# Patient Record
Sex: FEMALE | Race: BLACK OR AFRICAN AMERICAN | Employment: OTHER | URBAN - METROPOLITAN AREA
[De-identification: names, ages, dates, MRNs, and addresses within clinical notes are randomized per-mention and may not be internally consistent; named-entity substitution may affect disease eponyms.]

---

## 2017-01-10 RX ORDER — MONTELUKAST SODIUM 10 MG/1
TABLET ORAL
Qty: 30 TABLET | Refills: 5 | Status: SHIPPED | OUTPATIENT
Start: 2017-01-10 | End: 2017-01-30 | Stop reason: SDUPTHER

## 2017-01-30 RX ORDER — MONTELUKAST SODIUM 10 MG/1
TABLET ORAL
Qty: 30 TABLET | Refills: 5 | Status: SHIPPED | OUTPATIENT
Start: 2017-01-30 | End: 2017-11-19 | Stop reason: SDUPTHER

## 2017-02-03 RX ORDER — HYDROCHLOROTHIAZIDE 12.5 MG/1
CAPSULE, GELATIN COATED ORAL
Qty: 30 CAPSULE | Refills: 5 | Status: CANCELLED | OUTPATIENT
Start: 2017-02-03

## 2017-02-04 RX ORDER — HYDROCHLOROTHIAZIDE 12.5 MG/1
CAPSULE, GELATIN COATED ORAL
Qty: 30 CAPSULE | Refills: 5 | Status: SHIPPED | OUTPATIENT
Start: 2017-02-04 | End: 2017-10-17 | Stop reason: SDUPTHER

## 2017-02-04 RX ORDER — PRAVASTATIN SODIUM 10 MG
TABLET ORAL
Qty: 30 TABLET | Refills: 0 | Status: SHIPPED | OUTPATIENT
Start: 2017-02-04 | End: 2017-04-04 | Stop reason: SDUPTHER

## 2017-02-07 RX ORDER — FAMOTIDINE 20 MG/1
TABLET, FILM COATED ORAL
Qty: 180 TABLET | Refills: 3 | Status: SHIPPED | OUTPATIENT
Start: 2017-02-07 | End: 2018-02-10 | Stop reason: SDUPTHER

## 2017-02-13 ENCOUNTER — CARE COORDINATION (OUTPATIENT)
Dept: CARE COORDINATION | Age: 67
End: 2017-02-13

## 2017-03-08 RX ORDER — SERTRALINE HYDROCHLORIDE 100 MG/1
TABLET, FILM COATED ORAL
Qty: 90 TABLET | Refills: 3 | Status: SHIPPED | OUTPATIENT
Start: 2017-03-08 | End: 2017-09-25 | Stop reason: SDUPTHER

## 2017-03-15 ENCOUNTER — OFFICE VISIT (OUTPATIENT)
Dept: PRIMARY CARE CLINIC | Age: 67
End: 2017-03-15

## 2017-03-15 VITALS
SYSTOLIC BLOOD PRESSURE: 140 MMHG | HEART RATE: 73 BPM | BODY MASS INDEX: 42.58 KG/M2 | OXYGEN SATURATION: 97 % | WEIGHT: 218 LBS | DIASTOLIC BLOOD PRESSURE: 70 MMHG

## 2017-03-15 DIAGNOSIS — Z79.4 CONTROLLED TYPE 2 DIABETES MELLITUS WITHOUT COMPLICATION, WITH LONG-TERM CURRENT USE OF INSULIN (HCC): ICD-10-CM

## 2017-03-15 DIAGNOSIS — I10 ESSENTIAL HYPERTENSION: ICD-10-CM

## 2017-03-15 DIAGNOSIS — E11.9 CONTROLLED TYPE 2 DIABETES MELLITUS WITHOUT COMPLICATION, WITH LONG-TERM CURRENT USE OF INSULIN (HCC): ICD-10-CM

## 2017-03-15 DIAGNOSIS — E78.00 PURE HYPERCHOLESTEROLEMIA: Primary | ICD-10-CM

## 2017-03-15 LAB
ANION GAP SERPL CALCULATED.3IONS-SCNC: 14 MMOL/L (ref 3–16)
BUN BLDV-MCNC: 14 MG/DL (ref 7–20)
CALCIUM SERPL-MCNC: 9.3 MG/DL (ref 8.3–10.6)
CHLORIDE BLD-SCNC: 102 MMOL/L (ref 99–110)
CO2: 30 MMOL/L (ref 21–32)
CREAT SERPL-MCNC: 0.8 MG/DL (ref 0.6–1.2)
GFR AFRICAN AMERICAN: >60
GFR NON-AFRICAN AMERICAN: >60
GLUCOSE BLD-MCNC: 67 MG/DL (ref 70–99)
POTASSIUM SERPL-SCNC: 3.7 MMOL/L (ref 3.5–5.1)
SODIUM BLD-SCNC: 146 MMOL/L (ref 136–145)

## 2017-03-15 PROCEDURE — 99214 OFFICE O/P EST MOD 30 MIN: CPT | Performed by: FAMILY MEDICINE

## 2017-03-15 ASSESSMENT — ENCOUNTER SYMPTOMS
BLURRED VISION: 0
ORTHOPNEA: 0
SHORTNESS OF BREATH: 0
VISUAL CHANGE: 0

## 2017-04-04 RX ORDER — LORATADINE 10 MG/1
TABLET ORAL
Qty: 30 TABLET | Refills: 5 | Status: SHIPPED | OUTPATIENT
Start: 2017-04-04 | End: 2017-09-25 | Stop reason: SDUPTHER

## 2017-04-04 RX ORDER — ALENDRONATE SODIUM 70 MG/1
TABLET ORAL
Qty: 4 TABLET | Refills: 5 | Status: SHIPPED | OUTPATIENT
Start: 2017-04-04 | End: 2017-09-23 | Stop reason: SDUPTHER

## 2017-04-04 RX ORDER — PRAVASTATIN SODIUM 10 MG
TABLET ORAL
Qty: 30 TABLET | Refills: 0 | Status: SHIPPED | OUTPATIENT
Start: 2017-04-04 | End: 2017-10-17 | Stop reason: ALTCHOICE

## 2017-04-13 DIAGNOSIS — Z79.4 TYPE 2 DIABETES MELLITUS WITHOUT COMPLICATION, WITH LONG-TERM CURRENT USE OF INSULIN (HCC): ICD-10-CM

## 2017-04-13 DIAGNOSIS — E11.9 TYPE 2 DIABETES MELLITUS WITHOUT COMPLICATION, WITH LONG-TERM CURRENT USE OF INSULIN (HCC): ICD-10-CM

## 2017-04-26 ENCOUNTER — CARE COORDINATION (OUTPATIENT)
Dept: CARE COORDINATION | Age: 67
End: 2017-04-26

## 2017-05-04 ENCOUNTER — TELEPHONE (OUTPATIENT)
Dept: PRIMARY CARE CLINIC | Age: 67
End: 2017-05-04

## 2017-05-05 RX ORDER — AZITHROMYCIN 250 MG/1
TABLET, FILM COATED ORAL
Qty: 1 PACKET | Refills: 0 | Status: SHIPPED | OUTPATIENT
Start: 2017-05-05 | End: 2017-05-12 | Stop reason: SDUPTHER

## 2017-05-09 ENCOUNTER — TELEPHONE (OUTPATIENT)
Dept: FAMILY MEDICINE CLINIC | Age: 67
End: 2017-05-09

## 2017-05-12 ENCOUNTER — TELEPHONE (OUTPATIENT)
Dept: PRIMARY CARE CLINIC | Age: 67
End: 2017-05-12

## 2017-05-12 RX ORDER — AZITHROMYCIN 250 MG/1
TABLET, FILM COATED ORAL
Qty: 1 PACKET | Refills: 0 | Status: SHIPPED | OUTPATIENT
Start: 2017-05-12 | End: 2017-05-22

## 2017-05-26 ENCOUNTER — TELEPHONE (OUTPATIENT)
Dept: PRIMARY CARE CLINIC | Age: 67
End: 2017-05-26

## 2017-05-31 ENCOUNTER — TELEPHONE (OUTPATIENT)
Dept: PRIMARY CARE CLINIC | Age: 67
End: 2017-05-31

## 2017-06-06 ENCOUNTER — TELEPHONE (OUTPATIENT)
Dept: PRIMARY CARE CLINIC | Age: 67
End: 2017-06-06

## 2017-06-16 ENCOUNTER — OFFICE VISIT (OUTPATIENT)
Dept: PRIMARY CARE CLINIC | Age: 67
End: 2017-06-16

## 2017-06-16 VITALS
DIASTOLIC BLOOD PRESSURE: 80 MMHG | TEMPERATURE: 97.7 F | BODY MASS INDEX: 42.58 KG/M2 | OXYGEN SATURATION: 100 % | SYSTOLIC BLOOD PRESSURE: 138 MMHG | WEIGHT: 218 LBS | RESPIRATION RATE: 16 BRPM | HEART RATE: 68 BPM

## 2017-06-16 DIAGNOSIS — R53.83 FATIGUE, UNSPECIFIED TYPE: Primary | ICD-10-CM

## 2017-06-16 DIAGNOSIS — Z79.4 CONTROLLED TYPE 2 DIABETES MELLITUS WITHOUT COMPLICATION, WITH LONG-TERM CURRENT USE OF INSULIN (HCC): ICD-10-CM

## 2017-06-16 DIAGNOSIS — E11.9 CONTROLLED TYPE 2 DIABETES MELLITUS WITHOUT COMPLICATION, WITH LONG-TERM CURRENT USE OF INSULIN (HCC): ICD-10-CM

## 2017-06-16 DIAGNOSIS — F17.211 CIGARETTE NICOTINE DEPENDENCE IN REMISSION: ICD-10-CM

## 2017-06-16 LAB
A/G RATIO: 1.4 (ref 1.1–2.2)
ALBUMIN SERPL-MCNC: 4.3 G/DL (ref 3.4–5)
ALP BLD-CCNC: 84 U/L (ref 40–129)
ALT SERPL-CCNC: 8 U/L (ref 10–40)
ANION GAP SERPL CALCULATED.3IONS-SCNC: 14 MMOL/L (ref 3–16)
AST SERPL-CCNC: 10 U/L (ref 15–37)
BASOPHILS ABSOLUTE: 0 K/UL (ref 0–0.2)
BASOPHILS RELATIVE PERCENT: 0.2 %
BILIRUB SERPL-MCNC: 0.4 MG/DL (ref 0–1)
BUN BLDV-MCNC: 18 MG/DL (ref 7–20)
CALCIUM SERPL-MCNC: 9.7 MG/DL (ref 8.3–10.6)
CHLORIDE BLD-SCNC: 101 MMOL/L (ref 99–110)
CO2: 27 MMOL/L (ref 21–32)
CREAT SERPL-MCNC: 0.8 MG/DL (ref 0.6–1.2)
EOSINOPHILS ABSOLUTE: 0.2 K/UL (ref 0–0.6)
EOSINOPHILS RELATIVE PERCENT: 1.4 %
GFR AFRICAN AMERICAN: >60
GFR NON-AFRICAN AMERICAN: >60
GLOBULIN: 3.1 G/DL
GLUCOSE BLD-MCNC: 67 MG/DL (ref 70–99)
HCT VFR BLD CALC: 38 % (ref 36–48)
HEMOGLOBIN: 12 G/DL (ref 12–16)
LYMPHOCYTES ABSOLUTE: 3.2 K/UL (ref 1–5.1)
LYMPHOCYTES RELATIVE PERCENT: 29.9 %
MCH RBC QN AUTO: 26.6 PG (ref 26–34)
MCHC RBC AUTO-ENTMCNC: 31.5 G/DL (ref 31–36)
MCV RBC AUTO: 84.4 FL (ref 80–100)
MONOCYTES ABSOLUTE: 0.7 K/UL (ref 0–1.3)
MONOCYTES RELATIVE PERCENT: 6.1 %
NEUTROPHILS ABSOLUTE: 6.7 K/UL (ref 1.7–7.7)
NEUTROPHILS RELATIVE PERCENT: 62.4 %
PDW BLD-RTO: 15.5 % (ref 12.4–15.4)
PLATELET # BLD: 345 K/UL (ref 135–450)
PMV BLD AUTO: 8.1 FL (ref 5–10.5)
POTASSIUM SERPL-SCNC: 4.4 MMOL/L (ref 3.5–5.1)
RBC # BLD: 4.5 M/UL (ref 4–5.2)
SODIUM BLD-SCNC: 142 MMOL/L (ref 136–145)
TOTAL PROTEIN: 7.4 G/DL (ref 6.4–8.2)
TSH SERPL DL<=0.05 MIU/L-ACNC: 2.09 UIU/ML (ref 0.27–4.2)
WBC # BLD: 10.8 K/UL (ref 4–11)

## 2017-06-16 PROCEDURE — 99214 OFFICE O/P EST MOD 30 MIN: CPT | Performed by: FAMILY MEDICINE

## 2017-06-16 RX ORDER — MULTIVIT-MIN/IRON FUM/FOLIC AC 7.5 MG-4
1 TABLET ORAL DAILY
Qty: 30 TABLET | Refills: 3 | Status: SHIPPED | OUTPATIENT
Start: 2017-06-16 | End: 2020-12-28 | Stop reason: SDUPTHER

## 2017-06-16 ASSESSMENT — ENCOUNTER SYMPTOMS
CHEST TIGHTNESS: 0
VISUAL CHANGE: 0
VOMITING: 0
TROUBLE SWALLOWING: 0
BACK PAIN: 0
CONSTIPATION: 0
ABDOMINAL PAIN: 0
EYE REDNESS: 0
SHORTNESS OF BREATH: 0
STRIDOR: 0
BLOOD IN STOOL: 0
NAUSEA: 0
COLOR CHANGE: 0
APNEA: 0
DIARRHEA: 0
SORE THROAT: 0
CHANGE IN BOWEL HABIT: 0
EYE DISCHARGE: 0
SINUS PRESSURE: 0
RECTAL PAIN: 0
CHOKING: 0
WHEEZING: 0
EYE ITCHING: 0
BLURRED VISION: 0
ABDOMINAL DISTENTION: 0
COUGH: 0
EYE PAIN: 0
PHOTOPHOBIA: 0
RHINORRHEA: 0
SWOLLEN GLANDS: 0

## 2017-07-03 RX ORDER — AMLODIPINE BESYLATE 10 MG/1
10 TABLET ORAL DAILY
Qty: 30 TABLET | Refills: 3 | Status: SHIPPED | OUTPATIENT
Start: 2017-07-03 | End: 2017-11-07 | Stop reason: SDUPTHER

## 2017-08-25 DIAGNOSIS — E11.9 CONTROLLED TYPE 2 DIABETES MELLITUS WITHOUT COMPLICATION, WITHOUT LONG-TERM CURRENT USE OF INSULIN (HCC): Primary | ICD-10-CM

## 2017-08-25 RX ORDER — GLUCOSAMINE HCL/CHONDROITIN SU 500-400 MG
CAPSULE ORAL
Qty: 200 STRIP | Refills: 5 | Status: SHIPPED | OUTPATIENT
Start: 2017-08-25 | End: 2017-10-30 | Stop reason: SDUPTHER

## 2017-09-15 ENCOUNTER — OFFICE VISIT (OUTPATIENT)
Dept: PRIMARY CARE CLINIC | Age: 67
End: 2017-09-15

## 2017-09-15 VITALS
BODY MASS INDEX: 42.58 KG/M2 | SYSTOLIC BLOOD PRESSURE: 130 MMHG | HEART RATE: 80 BPM | TEMPERATURE: 99 F | WEIGHT: 218 LBS | RESPIRATION RATE: 18 BRPM | OXYGEN SATURATION: 94 % | DIASTOLIC BLOOD PRESSURE: 60 MMHG

## 2017-09-15 DIAGNOSIS — I10 ESSENTIAL HYPERTENSION: ICD-10-CM

## 2017-09-15 DIAGNOSIS — Z23 NEED FOR TDAP VACCINATION: ICD-10-CM

## 2017-09-15 DIAGNOSIS — M17.10 ARTHRITIS OF KNEE: ICD-10-CM

## 2017-09-15 DIAGNOSIS — Z79.4 CONTROLLED TYPE 2 DIABETES MELLITUS WITHOUT COMPLICATION, WITH LONG-TERM CURRENT USE OF INSULIN (HCC): ICD-10-CM

## 2017-09-15 DIAGNOSIS — E11.9 CONTROLLED TYPE 2 DIABETES MELLITUS WITHOUT COMPLICATION, WITH LONG-TERM CURRENT USE OF INSULIN (HCC): ICD-10-CM

## 2017-09-15 DIAGNOSIS — E78.00 PURE HYPERCHOLESTEROLEMIA: Primary | ICD-10-CM

## 2017-09-15 DIAGNOSIS — F17.211 CIGARETTE NICOTINE DEPENDENCE IN REMISSION: ICD-10-CM

## 2017-09-15 DIAGNOSIS — Z23 FLU VACCINE NEED: ICD-10-CM

## 2017-09-15 LAB
ANION GAP SERPL CALCULATED.3IONS-SCNC: 18 MMOL/L (ref 3–16)
AST SERPL-CCNC: 11 U/L (ref 15–37)
BUN BLDV-MCNC: 19 MG/DL (ref 7–20)
CALCIUM SERPL-MCNC: 9.8 MG/DL (ref 8.3–10.6)
CHLORIDE BLD-SCNC: 100 MMOL/L (ref 99–110)
CHOLESTEROL, TOTAL: 164 MG/DL (ref 0–199)
CO2: 26 MMOL/L (ref 21–32)
CREAT SERPL-MCNC: 1 MG/DL (ref 0.6–1.2)
GFR AFRICAN AMERICAN: >60
GFR NON-AFRICAN AMERICAN: 55
GLUCOSE BLD-MCNC: 110 MG/DL (ref 70–99)
HBA1C MFR BLD: 7 %
HDLC SERPL-MCNC: 72 MG/DL (ref 40–60)
LDL CHOLESTEROL CALCULATED: 77 MG/DL
POTASSIUM SERPL-SCNC: 4.3 MMOL/L (ref 3.5–5.1)
SODIUM BLD-SCNC: 144 MMOL/L (ref 136–145)
TRIGL SERPL-MCNC: 77 MG/DL (ref 0–150)
VLDLC SERPL CALC-MCNC: 15 MG/DL

## 2017-09-15 PROCEDURE — G0008 ADMIN INFLUENZA VIRUS VAC: HCPCS | Performed by: FAMILY MEDICINE

## 2017-09-15 PROCEDURE — 90715 TDAP VACCINE 7 YRS/> IM: CPT | Performed by: FAMILY MEDICINE

## 2017-09-15 PROCEDURE — 99214 OFFICE O/P EST MOD 30 MIN: CPT | Performed by: FAMILY MEDICINE

## 2017-09-15 PROCEDURE — 90662 IIV NO PRSV INCREASED AG IM: CPT | Performed by: FAMILY MEDICINE

## 2017-09-15 PROCEDURE — 90471 IMMUNIZATION ADMIN: CPT | Performed by: FAMILY MEDICINE

## 2017-09-15 PROCEDURE — 83036 HEMOGLOBIN GLYCOSYLATED A1C: CPT | Performed by: FAMILY MEDICINE

## 2017-09-15 ASSESSMENT — ENCOUNTER SYMPTOMS
VOMITING: 0
EYE REDNESS: 0
SINUS PRESSURE: 0
BLURRED VISION: 0
APNEA: 0
CHEST TIGHTNESS: 0
COUGH: 0
SHORTNESS OF BREATH: 0
BLOOD IN STOOL: 0
EYE PAIN: 0
NAUSEA: 0
ORTHOPNEA: 0
VISUAL CHANGE: 0
COLOR CHANGE: 0
SORE THROAT: 0
WHEEZING: 0
EYE ITCHING: 0
STRIDOR: 0
BACK PAIN: 0
RECTAL PAIN: 0
EYE DISCHARGE: 0
RHINORRHEA: 0
DIARRHEA: 0
CHOKING: 0
PHOTOPHOBIA: 0
ABDOMINAL DISTENTION: 0
CONSTIPATION: 0
TROUBLE SWALLOWING: 0

## 2017-09-25 ENCOUNTER — TELEPHONE (OUTPATIENT)
Dept: PRIMARY CARE CLINIC | Age: 67
End: 2017-09-25

## 2017-09-25 DIAGNOSIS — I10 ESSENTIAL HYPERTENSION: Primary | ICD-10-CM

## 2017-09-25 DIAGNOSIS — J45.40 MODERATE PERSISTENT ASTHMA WITHOUT COMPLICATION: ICD-10-CM

## 2017-09-26 RX ORDER — ALENDRONATE SODIUM 70 MG/1
TABLET ORAL
Qty: 4 TABLET | Refills: 5 | Status: SHIPPED | OUTPATIENT
Start: 2017-09-26 | End: 2018-04-09 | Stop reason: SDUPTHER

## 2017-09-26 RX ORDER — SERTRALINE HYDROCHLORIDE 100 MG/1
TABLET, FILM COATED ORAL
Qty: 60 TABLET | Refills: 3 | Status: SHIPPED | OUTPATIENT
Start: 2017-09-26 | End: 2017-10-17 | Stop reason: SDUPTHER

## 2017-09-26 RX ORDER — LORATADINE 10 MG/1
TABLET ORAL
Qty: 30 TABLET | Refills: 3 | Status: SHIPPED | OUTPATIENT
Start: 2017-09-26 | End: 2018-02-10 | Stop reason: SDUPTHER

## 2017-10-17 DIAGNOSIS — I10 ESSENTIAL HYPERTENSION: ICD-10-CM

## 2017-10-17 RX ORDER — HYDROCHLOROTHIAZIDE 12.5 MG/1
12.5 CAPSULE, GELATIN COATED ORAL DAILY
Qty: 30 CAPSULE | Refills: 10 | Status: SHIPPED | OUTPATIENT
Start: 2017-10-17 | End: 2018-03-02 | Stop reason: SDUPTHER

## 2017-10-17 RX ORDER — ALBUTEROL SULFATE 2.5 MG/3ML
2.5 SOLUTION RESPIRATORY (INHALATION) EVERY 6 HOURS PRN
Qty: 120 EACH | Refills: 5 | Status: CANCELLED | OUTPATIENT
Start: 2017-10-17

## 2017-10-17 RX ORDER — LANCETS 30 GAUGE
EACH MISCELLANEOUS
Qty: 100 EACH | Refills: 5 | Status: SHIPPED | OUTPATIENT
Start: 2017-10-17 | End: 2017-10-30 | Stop reason: SDUPTHER

## 2017-10-17 RX ORDER — ALBUTEROL SULFATE 2.5 MG/3ML
SOLUTION RESPIRATORY (INHALATION)
Qty: 120 EACH | Refills: 5 | Status: SHIPPED | OUTPATIENT
Start: 2017-10-17 | End: 2019-02-14 | Stop reason: SDUPTHER

## 2017-10-17 RX ORDER — ALBUTEROL SULFATE 90 UG/1
2 AEROSOL, METERED RESPIRATORY (INHALATION) EVERY 6 HOURS PRN
Qty: 1 INHALER | Refills: 3 | Status: SHIPPED | OUTPATIENT
Start: 2017-10-17 | End: 2018-03-10 | Stop reason: SDUPTHER

## 2017-10-17 RX ORDER — ATORVASTATIN CALCIUM 40 MG/1
TABLET, FILM COATED ORAL
Qty: 30 TABLET | Refills: 10 | Status: SHIPPED | OUTPATIENT
Start: 2017-10-17 | End: 2018-10-10 | Stop reason: SDUPTHER

## 2017-10-17 RX ORDER — ERGOCALCIFEROL 1.25 MG/1
CAPSULE ORAL
Qty: 4 CAPSULE | Refills: 3 | Status: SHIPPED | OUTPATIENT
Start: 2017-10-17 | End: 2018-10-12 | Stop reason: SDUPTHER

## 2017-10-17 RX ORDER — SERTRALINE HYDROCHLORIDE 100 MG/1
TABLET, FILM COATED ORAL
Qty: 60 TABLET | Refills: 10 | Status: SHIPPED | OUTPATIENT
Start: 2017-10-17 | End: 2018-10-10 | Stop reason: SDUPTHER

## 2017-10-29 RX ORDER — PRAVASTATIN SODIUM 10 MG
TABLET ORAL
Qty: 30 TABLET | Refills: 6 | Status: SHIPPED | OUTPATIENT
Start: 2017-10-29 | End: 2019-05-06 | Stop reason: SDUPTHER

## 2017-10-30 DIAGNOSIS — E11.9 CONTROLLED TYPE 2 DIABETES MELLITUS WITHOUT COMPLICATION, WITHOUT LONG-TERM CURRENT USE OF INSULIN (HCC): ICD-10-CM

## 2017-10-30 RX ORDER — LANCETS 30 GAUGE
EACH MISCELLANEOUS
Qty: 100 EACH | Refills: 5 | Status: SHIPPED | OUTPATIENT
Start: 2017-10-30 | End: 2018-04-30 | Stop reason: SDUPTHER

## 2017-10-30 RX ORDER — GLUCOSAMINE HCL/CHONDROITIN SU 500-400 MG
CAPSULE ORAL
Qty: 200 STRIP | Refills: 5 | Status: SHIPPED | OUTPATIENT
Start: 2017-10-30 | End: 2018-06-15 | Stop reason: SDUPTHER

## 2017-10-30 RX ORDER — BLOOD PRESSURE TEST KIT
KIT MISCELLANEOUS
Qty: 200 EACH | Refills: 5 | Status: SHIPPED | OUTPATIENT
Start: 2017-10-30 | End: 2019-05-06 | Stop reason: SDUPTHER

## 2017-11-08 ENCOUNTER — TELEPHONE (OUTPATIENT)
Dept: PRIMARY CARE CLINIC | Age: 67
End: 2017-11-08

## 2017-11-08 RX ORDER — AMLODIPINE BESYLATE 10 MG/1
10 TABLET ORAL DAILY
Qty: 30 TABLET | Refills: 3 | Status: SHIPPED | OUTPATIENT
Start: 2017-11-08 | End: 2017-11-16 | Stop reason: SDUPTHER

## 2017-11-16 ENCOUNTER — HOSPITAL ENCOUNTER (OUTPATIENT)
Dept: MAMMOGRAPHY | Age: 67
Discharge: HOME | End: 2017-11-16
Attending: FAMILY MEDICINE | Admitting: FAMILY MEDICINE

## 2017-11-16 DIAGNOSIS — Z12.31 ENCOUNTER FOR SCREENING MAMMOGRAM FOR BREAST CANCER: ICD-10-CM

## 2018-01-02 ENCOUNTER — TELEPHONE (OUTPATIENT)
Dept: PRIMARY CARE CLINIC | Age: 68
End: 2018-01-02

## 2018-01-02 RX ORDER — GLUCOSAMINE HCL/CHONDROITIN SU 500-400 MG
CAPSULE ORAL
Qty: 100 STRIP | Refills: 10 | Status: SHIPPED | OUTPATIENT
Start: 2018-01-02 | End: 2018-01-06 | Stop reason: SDUPTHER

## 2018-01-05 ENCOUNTER — TELEPHONE (OUTPATIENT)
Dept: PRIMARY CARE CLINIC | Age: 68
End: 2018-01-05

## 2018-01-06 RX ORDER — GLUCOSAMINE HCL/CHONDROITIN SU 500-400 MG
CAPSULE ORAL
Qty: 100 STRIP | Refills: 10 | Status: SHIPPED | OUTPATIENT
Start: 2018-01-06 | End: 2018-06-15 | Stop reason: SDUPTHER

## 2018-01-22 DIAGNOSIS — E11.9 TYPE 2 DIABETES MELLITUS WITHOUT COMPLICATION, WITH LONG-TERM CURRENT USE OF INSULIN (HCC): Primary | ICD-10-CM

## 2018-01-22 DIAGNOSIS — Z79.4 TYPE 2 DIABETES MELLITUS WITHOUT COMPLICATION, WITH LONG-TERM CURRENT USE OF INSULIN (HCC): Primary | ICD-10-CM

## 2018-01-22 DIAGNOSIS — E11.9 CONTROLLED TYPE 2 DIABETES MELLITUS WITHOUT COMPLICATION, WITHOUT LONG-TERM CURRENT USE OF INSULIN (HCC): ICD-10-CM

## 2018-02-04 DIAGNOSIS — I10 ESSENTIAL HYPERTENSION: ICD-10-CM

## 2018-02-05 RX ORDER — SERTRALINE HYDROCHLORIDE 100 MG/1
TABLET, FILM COATED ORAL
Qty: 60 TABLET | Refills: 3 | OUTPATIENT
Start: 2018-02-05

## 2018-02-13 DIAGNOSIS — I10 ESSENTIAL HYPERTENSION: Primary | ICD-10-CM

## 2018-02-14 RX ORDER — AMLODIPINE BESYLATE 10 MG/1
TABLET ORAL
Qty: 90 TABLET | Refills: 1 | Status: SHIPPED | OUTPATIENT
Start: 2018-02-14 | End: 2018-10-12 | Stop reason: SDUPTHER

## 2018-02-16 ENCOUNTER — OFFICE VISIT (OUTPATIENT)
Dept: PRIMARY CARE CLINIC | Age: 68
End: 2018-02-16

## 2018-02-16 VITALS
OXYGEN SATURATION: 97 % | SYSTOLIC BLOOD PRESSURE: 127 MMHG | BODY MASS INDEX: 42.22 KG/M2 | TEMPERATURE: 97.4 F | DIASTOLIC BLOOD PRESSURE: 75 MMHG | HEART RATE: 82 BPM | WEIGHT: 216.2 LBS

## 2018-02-16 DIAGNOSIS — I10 ESSENTIAL HYPERTENSION: ICD-10-CM

## 2018-02-16 DIAGNOSIS — J45.40 MODERATE PERSISTENT ASTHMA WITHOUT COMPLICATION: ICD-10-CM

## 2018-02-16 DIAGNOSIS — L60.9 NAIL ABNORMALITY: Primary | ICD-10-CM

## 2018-02-16 DIAGNOSIS — Z79.4 TYPE 2 DIABETES MELLITUS WITHOUT COMPLICATION, WITH LONG-TERM CURRENT USE OF INSULIN (HCC): Primary | ICD-10-CM

## 2018-02-16 DIAGNOSIS — E78.00 PURE HYPERCHOLESTEROLEMIA: ICD-10-CM

## 2018-02-16 DIAGNOSIS — E11.9 TYPE 2 DIABETES MELLITUS WITHOUT COMPLICATION, WITH LONG-TERM CURRENT USE OF INSULIN (HCC): Primary | ICD-10-CM

## 2018-02-16 DIAGNOSIS — E11.9 TYPE 2 DIABETES MELLITUS WITHOUT COMPLICATION, WITH LONG-TERM CURRENT USE OF INSULIN (HCC): ICD-10-CM

## 2018-02-16 DIAGNOSIS — Z79.4 TYPE 2 DIABETES MELLITUS WITHOUT COMPLICATION, WITH LONG-TERM CURRENT USE OF INSULIN (HCC): ICD-10-CM

## 2018-02-16 LAB
ANION GAP SERPL CALCULATED.3IONS-SCNC: 15 MMOL/L (ref 3–16)
BUN BLDV-MCNC: 15 MG/DL (ref 7–20)
CALCIUM SERPL-MCNC: 9.5 MG/DL (ref 8.3–10.6)
CHLORIDE BLD-SCNC: 100 MMOL/L (ref 99–110)
CO2: 30 MMOL/L (ref 21–32)
CREAT SERPL-MCNC: 1 MG/DL (ref 0.6–1.2)
GFR AFRICAN AMERICAN: >60
GFR NON-AFRICAN AMERICAN: 55
GLUCOSE BLD-MCNC: 173 MG/DL (ref 70–99)
HBA1C MFR BLD: 6.9 %
POTASSIUM SERPL-SCNC: 3.6 MMOL/L (ref 3.5–5.1)
SODIUM BLD-SCNC: 145 MMOL/L (ref 136–145)

## 2018-02-16 PROCEDURE — 83036 HEMOGLOBIN GLYCOSYLATED A1C: CPT | Performed by: FAMILY MEDICINE

## 2018-02-16 PROCEDURE — 99214 OFFICE O/P EST MOD 30 MIN: CPT | Performed by: FAMILY MEDICINE

## 2018-02-16 ASSESSMENT — PATIENT HEALTH QUESTIONNAIRE - PHQ9
SUM OF ALL RESPONSES TO PHQ9 QUESTIONS 1 & 2: 1
1. LITTLE INTEREST OR PLEASURE IN DOING THINGS: 0
SUM OF ALL RESPONSES TO PHQ QUESTIONS 1-9: 1
2. FEELING DOWN, DEPRESSED OR HOPELESS: 1

## 2018-02-16 ASSESSMENT — ENCOUNTER SYMPTOMS
HOARSE VOICE: 0
VISUAL CHANGE: 0
BLURRED VISION: 0
HEMOPTYSIS: 0
ORTHOPNEA: 0
SPUTUM PRODUCTION: 0
TROUBLE SWALLOWING: 0
COUGH: 0
SHORTNESS OF BREATH: 0
SORE THROAT: 0
FREQUENT THROAT CLEARING: 0
WHEEZING: 0
RHINORRHEA: 0
CHEST TIGHTNESS: 0
DIFFICULTY BREATHING: 0
HEARTBURN: 0

## 2018-02-16 NOTE — PROGRESS NOTES
impotence, nephropathy, peripheral neuropathy, PVD or retinopathy. Risk factors for coronary artery disease include diabetes mellitus, dyslipidemia, male sex and hypertension. Current diabetic treatment includes oral agent (monotherapy) and insulin injections. She is compliant with treatment all of the time. She has had a previous visit with a dietitian. She participates in exercise daily. Her breakfast blood glucose range is generally 130-140 mg/dl. Her lunch blood glucose range is generally 130-140 mg/dl. Her dinner blood glucose range is generally 130-140 mg/dl. Her overall blood glucose range is 130-140 mg/dl. An ACE inhibitor/angiotensin II receptor blocker is being taken. Eye exam is current. Hyperlipidemia   This is a chronic problem. The current episode started more than 1 year ago. The problem is controlled. Recent lipid tests were reviewed and are normal. She has no history of chronic renal disease, diabetes, liver disease, obesity or nephrotic syndrome. Pertinent negatives include no chest pain, focal sensory loss, focal weakness, leg pain, myalgias or shortness of breath. Current antihyperlipidemic treatment includes statins. The current treatment provides significant improvement of lipids. Asthma   There is no chest tightness, cough, difficulty breathing, frequent throat clearing, hemoptysis, hoarse voice, shortness of breath, sputum production or wheezing. The current episode started more than 1 year ago. The problem occurs rarely. The problem has been rapidly improving. Pertinent negatives include no appetite change, chest pain, dyspnea on exertion, ear congestion, ear pain, fever, headaches, heartburn, malaise/fatigue, myalgias, nasal congestion, orthopnea, PND, postnasal drip, rhinorrhea, sneezing, sore throat, sweats, trouble swallowing or weight loss. Her symptoms are aggravated by emotional stress and climbing stairs.  Her symptoms are alleviated by steroid inhaler, beta-agonist and leukotriene antagonist. She reports complete improvement on treatment. Her past medical history is significant for asthma. There is no history of bronchiectasis, bronchitis, COPD, emphysema or pneumonia. Review of Systems   Constitutional: Negative for appetite change, fatigue, fever, malaise/fatigue and weight loss. HENT: Negative for ear pain, hoarse voice, postnasal drip, rhinorrhea, sneezing, sore throat and trouble swallowing. Eyes: Negative for blurred vision. Respiratory: Negative for cough, hemoptysis, sputum production, shortness of breath and wheezing. Cardiovascular: Negative for chest pain, dyspnea on exertion, palpitations, orthopnea and PND. Gastrointestinal: Negative for heartburn. Endocrine: Negative for polydipsia, polyphagia and polyuria. Genitourinary: Negative for impotence. Musculoskeletal: Negative for myalgias and neck pain. Skin: Negative for pallor. Neurological: Negative for dizziness, tremors, focal weakness, seizures, speech difficulty, weakness and headaches. Psychiatric/Behavioral: Negative for confusion. The patient is not nervous/anxious. Objective:   Physical Exam   Constitutional: She is oriented to person, place, and time. She appears well-developed and well-nourished. No distress. HENT:   Head: Normocephalic and atraumatic. Right Ear: External ear normal.   Left Ear: External ear normal.   Nose: Nose normal.   Mouth/Throat: Oropharynx is clear and moist. No oropharyngeal exudate. Eyes: Conjunctivae and EOM are normal. Pupils are equal, round, and reactive to light. Left eye exhibits no discharge. No scleral icterus. Neck: Normal range of motion. Neck supple. No JVD present. No tracheal deviation present. No thyromegaly present. Cardiovascular: Normal rate, regular rhythm, normal heart sounds and intact distal pulses. Exam reveals no gallop and no friction rub. No murmur heard.   Pulmonary/Chest: Effort normal and breath sounds normal. No stridor. No respiratory distress. She has no wheezes. She has no rales. She exhibits no tenderness. Abdominal: Soft. Bowel sounds are normal. She exhibits no distension and no mass. There is no tenderness. There is no rebound and no guarding. Musculoskeletal: Normal range of motion. She exhibits no edema or tenderness. Lymphadenopathy:     She has no cervical adenopathy. Neurological: She is alert and oriented to person, place, and time. She has normal reflexes. No cranial nerve deficit. Coordination normal.   Skin: Skin is warm and dry. No rash noted. She is not diaphoretic. No erythema. No pallor. Psychiatric: She has a normal mood and affect. Her behavior is normal. Judgment and thought content normal.   Nursing note and vitals reviewed. Lab Results   Component Value Date    CHOL 164 09/15/2017    CHOL 158 12/28/2016    CHOL 138 05/11/2015     Lab Results   Component Value Date    TRIG 77 09/15/2017    TRIG 63 12/28/2016    TRIG 63 05/11/2015     Lab Results   Component Value Date    HDL 72 (H) 09/15/2017    HDL 74 (H) 12/28/2016    HDL 63 (H) 05/11/2015     Lab Results   Component Value Date    LDLCALC 77 09/15/2017    LDLCALC 71 12/28/2016    LDLCALC 62 05/11/2015     Lab Results   Component Value Date    LABVLDL 15 09/15/2017    LABVLDL 13 12/28/2016    LABVLDL 13 05/11/2015     No results found for: [facility]  Lab Results   Component Value Date    CREATININE 1.0 09/15/2017    BUN 19 09/15/2017     09/15/2017    K 4.3 09/15/2017     09/15/2017    CO2 26 09/15/2017     Assessment:      1. Type 2 diabetes mellitus without complication, with long-term current use of insulin (HCC)  aic 6.9  At goal < 7  Ck labs  - POCT glycosylated hemoglobin (Hb A1C)  - Basic Metabolic Panel; Future    2. Pure hypercholesterolemia  Lipids at goal  HDL . 40  LDL < 100  On statin    3. Essential hypertension  bp at goal < 130/80    Ck labs    - Basic Metabolic Panel; Future    4.  Moderate

## 2018-02-25 DIAGNOSIS — J45.40 MODERATE PERSISTENT ASTHMA WITHOUT COMPLICATION: ICD-10-CM

## 2018-02-26 RX ORDER — LORATADINE 10 MG/1
TABLET ORAL
Qty: 30 TABLET | Refills: 3 | Status: SHIPPED | OUTPATIENT
Start: 2018-02-26 | End: 2018-10-12 | Stop reason: SDUPTHER

## 2018-03-10 DIAGNOSIS — J45.40 MODERATE PERSISTENT ASTHMA WITHOUT COMPLICATION: Primary | ICD-10-CM

## 2018-04-08 DIAGNOSIS — I10 ESSENTIAL HYPERTENSION: ICD-10-CM

## 2018-04-09 RX ORDER — ALENDRONATE SODIUM 70 MG/1
TABLET ORAL
Qty: 4 TABLET | Refills: 5 | Status: SHIPPED | OUTPATIENT
Start: 2018-04-09 | End: 2018-06-05 | Stop reason: SDUPTHER

## 2018-04-30 DIAGNOSIS — E11.9 CONTROLLED TYPE 2 DIABETES MELLITUS WITHOUT COMPLICATION, WITHOUT LONG-TERM CURRENT USE OF INSULIN (HCC): ICD-10-CM

## 2018-05-01 RX ORDER — PEN NEEDLE, DIABETIC 31 GX5/16"
NEEDLE, DISPOSABLE MISCELLANEOUS
Qty: 100 EACH | Refills: 5 | Status: SHIPPED | OUTPATIENT
Start: 2018-05-01 | End: 2018-10-12 | Stop reason: SDUPTHER

## 2018-05-01 RX ORDER — LANCING DEVICE
EACH MISCELLANEOUS
Qty: 100 EACH | Refills: 5 | Status: SHIPPED | OUTPATIENT
Start: 2018-05-01 | End: 2019-03-07 | Stop reason: SDUPTHER

## 2018-05-18 ENCOUNTER — OFFICE VISIT (OUTPATIENT)
Dept: PRIMARY CARE CLINIC | Age: 68
End: 2018-05-18

## 2018-05-18 VITALS
OXYGEN SATURATION: 94 % | TEMPERATURE: 97 F | WEIGHT: 217 LBS | HEIGHT: 60 IN | DIASTOLIC BLOOD PRESSURE: 67 MMHG | SYSTOLIC BLOOD PRESSURE: 123 MMHG | HEART RATE: 79 BPM | BODY MASS INDEX: 42.6 KG/M2

## 2018-05-18 DIAGNOSIS — M17.12 ARTHRITIS OF LEFT KNEE: ICD-10-CM

## 2018-05-18 DIAGNOSIS — Z23 NEED FOR SHINGLES VACCINE: ICD-10-CM

## 2018-05-18 DIAGNOSIS — I10 ESSENTIAL HYPERTENSION: ICD-10-CM

## 2018-05-18 LAB
ANION GAP SERPL CALCULATED.3IONS-SCNC: 17 MMOL/L (ref 3–16)
BUN BLDV-MCNC: 15 MG/DL (ref 7–20)
CALCIUM SERPL-MCNC: 9.2 MG/DL (ref 8.3–10.6)
CHLORIDE BLD-SCNC: 96 MMOL/L (ref 99–110)
CO2: 28 MMOL/L (ref 21–32)
CREAT SERPL-MCNC: 0.9 MG/DL (ref 0.6–1.2)
GFR AFRICAN AMERICAN: >60
GFR NON-AFRICAN AMERICAN: >60
GLUCOSE BLD-MCNC: 214 MG/DL (ref 70–99)
HBA1C MFR BLD: 7.2 %
POTASSIUM SERPL-SCNC: 3.5 MMOL/L (ref 3.5–5.1)
SODIUM BLD-SCNC: 141 MMOL/L (ref 136–145)

## 2018-05-18 PROCEDURE — 83036 HEMOGLOBIN GLYCOSYLATED A1C: CPT | Performed by: FAMILY MEDICINE

## 2018-05-18 PROCEDURE — 99214 OFFICE O/P EST MOD 30 MIN: CPT | Performed by: FAMILY MEDICINE

## 2018-05-18 ASSESSMENT — ENCOUNTER SYMPTOMS
VISUAL CHANGE: 0
SHORTNESS OF BREATH: 0
BLURRED VISION: 0
ORTHOPNEA: 0

## 2018-06-05 RX ORDER — ALENDRONATE SODIUM 35 MG/1
TABLET ORAL
Qty: 8 TABLET | Refills: 0 | Status: SHIPPED | OUTPATIENT
Start: 2018-06-05 | End: 2018-07-03 | Stop reason: SDUPTHER

## 2018-06-08 ENCOUNTER — OFFICE VISIT (OUTPATIENT)
Dept: PRIMARY CARE CLINIC | Age: 68
End: 2018-06-08

## 2018-06-08 VITALS
DIASTOLIC BLOOD PRESSURE: 80 MMHG | OXYGEN SATURATION: 96 % | WEIGHT: 215 LBS | HEIGHT: 60 IN | HEART RATE: 74 BPM | SYSTOLIC BLOOD PRESSURE: 122 MMHG | BODY MASS INDEX: 42.21 KG/M2 | TEMPERATURE: 97.6 F

## 2018-06-08 DIAGNOSIS — H61.23 BILATERAL IMPACTED CERUMEN: Primary | ICD-10-CM

## 2018-06-08 DIAGNOSIS — M20.60 DEFORMITY OF TOE, UNSPECIFIED LATERALITY: ICD-10-CM

## 2018-06-08 PROCEDURE — 99213 OFFICE O/P EST LOW 20 MIN: CPT | Performed by: FAMILY MEDICINE

## 2018-06-08 RX ORDER — FLUTICASONE PROPIONATE 110 UG/1
1 AEROSOL, METERED RESPIRATORY (INHALATION) 2 TIMES DAILY
COMMUNITY
End: 2018-09-14 | Stop reason: SDUPTHER

## 2018-06-08 ASSESSMENT — ENCOUNTER SYMPTOMS
SORE THROAT: 0
SHORTNESS OF BREATH: 0
VOMITING: 0
STRIDOR: 0
EYE ITCHING: 0
NAUSEA: 0
ABDOMINAL DISTENTION: 0
APNEA: 0
EYE DISCHARGE: 0
CHEST TIGHTNESS: 0
EYE REDNESS: 0
PHOTOPHOBIA: 0
CONSTIPATION: 0
DIARRHEA: 0
EYE PAIN: 0
BACK PAIN: 0
COUGH: 0
RHINORRHEA: 0
BLOOD IN STOOL: 0
CHOKING: 0
SINUS PRESSURE: 0
COLOR CHANGE: 0
RECTAL PAIN: 0
TROUBLE SWALLOWING: 0
WHEEZING: 0

## 2018-06-15 RX ORDER — GLUCOSAMINE HCL/CHONDROITIN SU 500-400 MG
CAPSULE ORAL
Qty: 100 STRIP | Refills: 10 | Status: SHIPPED | OUTPATIENT
Start: 2018-06-15 | End: 2018-10-12 | Stop reason: SDUPTHER

## 2018-07-03 RX ORDER — FAMOTIDINE 20 MG/1
TABLET, FILM COATED ORAL
Qty: 60 TABLET | Refills: 0 | Status: SHIPPED | OUTPATIENT
Start: 2018-07-03 | End: 2018-08-07 | Stop reason: SDUPTHER

## 2018-07-03 RX ORDER — ALENDRONATE SODIUM 70 MG/1
TABLET ORAL
Qty: 4 TABLET | Refills: 0 | Status: SHIPPED | OUTPATIENT
Start: 2018-07-03 | End: 2018-08-07 | Stop reason: SDUPTHER

## 2018-07-03 NOTE — TELEPHONE ENCOUNTER
Requested Prescriptions     Pending Prescriptions Disp Refills    alendronate (FOSAMAX) 70 MG tablet [Pharmacy Med Name: ALENDRONATE 70MG T(D)] 4 tablet 0     Sig: TAKE 1 TAB (70MG) BY MOUTH EVERY WEEK BEFORE BREAKFAST EMPTY STOMACH SIT UP 30 MIN (BONES)    famotidine (PEPCID) 20 MG tablet [Pharmacy Med Name: FAMOTIDINE 20MG T(R)] 60 tablet 0     Sig: TAKE 1 TABLET (20MG) BY MOUTH 2 TIMES A DAY (HEARTBURN)     Last OV: 6/8/2018

## 2018-07-06 DIAGNOSIS — Z79.4 TYPE 2 DIABETES MELLITUS WITHOUT COMPLICATION, WITH LONG-TERM CURRENT USE OF INSULIN (HCC): Primary | ICD-10-CM

## 2018-07-06 DIAGNOSIS — E11.9 TYPE 2 DIABETES MELLITUS WITHOUT COMPLICATION, WITH LONG-TERM CURRENT USE OF INSULIN (HCC): Primary | ICD-10-CM

## 2018-08-08 RX ORDER — ALENDRONATE SODIUM 70 MG/1
TABLET ORAL
Qty: 4 TABLET | Refills: 3 | Status: SHIPPED | OUTPATIENT
Start: 2018-08-08 | End: 2018-10-12 | Stop reason: SDUPTHER

## 2018-08-08 RX ORDER — FAMOTIDINE 20 MG/1
TABLET, FILM COATED ORAL
Qty: 60 TABLET | Refills: 3 | Status: SHIPPED | OUTPATIENT
Start: 2018-08-08 | End: 2018-10-12 | Stop reason: SDUPTHER

## 2018-09-14 ENCOUNTER — OFFICE VISIT (OUTPATIENT)
Dept: PRIMARY CARE CLINIC | Age: 68
End: 2018-09-14

## 2018-09-14 VITALS
TEMPERATURE: 96.7 F | DIASTOLIC BLOOD PRESSURE: 80 MMHG | OXYGEN SATURATION: 96 % | BODY MASS INDEX: 43.39 KG/M2 | HEART RATE: 78 BPM | HEIGHT: 60 IN | SYSTOLIC BLOOD PRESSURE: 135 MMHG | WEIGHT: 221 LBS

## 2018-09-14 DIAGNOSIS — Z13.220 SCREENING FOR HYPERLIPIDEMIA: ICD-10-CM

## 2018-09-14 DIAGNOSIS — Z23 NEED FOR PROPHYLACTIC VACCINATION AND INOCULATION AGAINST VARICELLA: ICD-10-CM

## 2018-09-14 DIAGNOSIS — E66.01 MORBIDLY OBESE (HCC): ICD-10-CM

## 2018-09-14 DIAGNOSIS — J45.40 MODERATE PERSISTENT ASTHMA, UNSPECIFIED WHETHER COMPLICATED: Primary | ICD-10-CM

## 2018-09-14 DIAGNOSIS — E55.9 VITAMIN D DEFICIENCY: ICD-10-CM

## 2018-09-14 LAB
CHOLESTEROL, TOTAL: 100 MG/DL (ref 0–199)
CREATININE URINE: 119.1 MG/DL (ref 28–259)
HBA1C MFR BLD: 6.9 %
HDLC SERPL-MCNC: 60 MG/DL (ref 40–60)
LDL CHOLESTEROL CALCULATED: 30 MG/DL
MICROALBUMIN UR-MCNC: 1.4 MG/DL
MICROALBUMIN/CREAT UR-RTO: 11.8 MG/G (ref 0–30)
TRIGL SERPL-MCNC: 49 MG/DL (ref 0–150)
VLDLC SERPL CALC-MCNC: 10 MG/DL

## 2018-09-14 PROCEDURE — 83036 HEMOGLOBIN GLYCOSYLATED A1C: CPT | Performed by: FAMILY MEDICINE

## 2018-09-14 PROCEDURE — 99214 OFFICE O/P EST MOD 30 MIN: CPT | Performed by: FAMILY MEDICINE

## 2018-09-14 RX ORDER — FLUTICASONE PROPIONATE 110 UG/1
1 AEROSOL, METERED RESPIRATORY (INHALATION) 2 TIMES DAILY
Qty: 1 INHALER | Refills: 5 | Status: SHIPPED | OUTPATIENT
Start: 2018-09-14 | End: 2019-07-19 | Stop reason: ALTCHOICE

## 2018-09-14 RX ORDER — ALBUTEROL SULFATE 90 UG/1
2 AEROSOL, METERED RESPIRATORY (INHALATION) EVERY 6 HOURS PRN
Qty: 1 INHALER | Refills: 3 | Status: SHIPPED | OUTPATIENT
Start: 2018-09-14 | End: 2021-02-22

## 2018-09-14 ASSESSMENT — ENCOUNTER SYMPTOMS
HEARTBURN: 0
TROUBLE SWALLOWING: 0
BLURRED VISION: 0
HOARSE VOICE: 0
HEMOPTYSIS: 0
SPUTUM PRODUCTION: 0
SHORTNESS OF BREATH: 0
COUGH: 1
DIFFICULTY BREATHING: 0
SORE THROAT: 0
CHEST TIGHTNESS: 1
WHEEZING: 0
VISUAL CHANGE: 0
FREQUENT THROAT CLEARING: 0
RHINORRHEA: 0

## 2018-10-10 DIAGNOSIS — I10 ESSENTIAL HYPERTENSION: ICD-10-CM

## 2018-10-10 RX ORDER — ATORVASTATIN CALCIUM 40 MG/1
TABLET, FILM COATED ORAL
Qty: 30 TABLET | Refills: 0 | Status: SHIPPED | OUTPATIENT
Start: 2018-10-10 | End: 2018-10-22 | Stop reason: SDUPTHER

## 2018-10-10 RX ORDER — HYDROCHLOROTHIAZIDE 25 MG/1
TABLET ORAL
Qty: 15 TABLET | Refills: 0 | Status: SHIPPED | OUTPATIENT
Start: 2018-10-10 | End: 2018-10-12 | Stop reason: SDUPTHER

## 2018-10-10 NOTE — TELEPHONE ENCOUNTER
Requested Prescriptions     Pending Prescriptions Disp Refills    hydrochlorothiazide (HYDRODIURIL) 25 MG tablet [Pharmacy Med Name: HYDROCHLRTHIAZ 25MG T(D)] 15 tablet 0     Sig: TAKE 1/2 TABLET (12.5MG) BY MOUTH EVERY DAY (DIURETIC/BLOOD PRESSURE)    atorvastatin (LIPITOR) 40 MG tablet [Pharmacy Med Name: ATORVASTATIN 40MG T(D)] 30 tablet 0     Sig: TAKE 1 TABLET (40MG) BY MOUTH EVERY DAY (CHOLESTEROL)    metFORMIN (GLUCOPHAGE) 1000 MG tablet [Pharmacy Med Name: METFORMIN HCL 1000MG T(D)] 60 tablet 0     Sig: TAKE 1 TABLET (1000MG) BY MOUTH 2 TIMES A DAY WITH MEALS (BLOOD SUGAR/DIABETES)    sertraline (ZOLOFT) 50 MG tablet [Pharmacy Med Name: SERTRALINE 50MG T(H)] 120 tablet 0     Sig: TAKE 2 TABLETS (100MG) BY MOUTH 2 TIMES A DAY (MOOD)    metoprolol tartrate (LOPRESSOR) 25 MG tablet [Pharmacy Med Name: METOPROLOL TART 25MG T(R)] 60 tablet 0     Sig: TAKE 1 TABLET (25MG) BY MOUTH 2 TIMES A DAY (BLOOD PRESSURE)       9/14/18

## 2018-10-12 DIAGNOSIS — I10 ESSENTIAL HYPERTENSION: ICD-10-CM

## 2018-10-12 DIAGNOSIS — J45.40 MODERATE PERSISTENT ASTHMA WITHOUT COMPLICATION: ICD-10-CM

## 2018-10-12 DIAGNOSIS — E11.9 TYPE 2 DIABETES MELLITUS WITHOUT COMPLICATION, WITH LONG-TERM CURRENT USE OF INSULIN (HCC): ICD-10-CM

## 2018-10-12 DIAGNOSIS — Z79.4 TYPE 2 DIABETES MELLITUS WITHOUT COMPLICATION, WITH LONG-TERM CURRENT USE OF INSULIN (HCC): ICD-10-CM

## 2018-10-12 RX ORDER — AMLODIPINE BESYLATE 10 MG/1
TABLET ORAL
Qty: 90 TABLET | Refills: 1 | Status: SHIPPED | OUTPATIENT
Start: 2018-10-12 | End: 2018-11-13 | Stop reason: SDUPTHER

## 2018-10-12 RX ORDER — ALENDRONATE SODIUM 70 MG/1
TABLET ORAL
Qty: 4 TABLET | Refills: 3 | Status: SHIPPED | OUTPATIENT
Start: 2018-10-12 | End: 2019-01-11 | Stop reason: SDUPTHER

## 2018-10-12 RX ORDER — LORATADINE 10 MG/1
TABLET ORAL
Qty: 30 TABLET | Refills: 3 | Status: SHIPPED | OUTPATIENT
Start: 2018-10-12 | End: 2019-01-11 | Stop reason: SDUPTHER

## 2018-10-12 RX ORDER — GLUCOSAMINE HCL/CHONDROITIN SU 500-400 MG
CAPSULE ORAL
Qty: 100 STRIP | Refills: 10 | Status: SHIPPED | OUTPATIENT
Start: 2018-10-12 | End: 2019-01-11 | Stop reason: SDUPTHER

## 2018-10-12 RX ORDER — HYDROCHLOROTHIAZIDE 25 MG/1
TABLET ORAL
Qty: 15 TABLET | Refills: 0 | Status: SHIPPED | OUTPATIENT
Start: 2018-10-12 | End: 2018-10-27 | Stop reason: SDUPTHER

## 2018-10-12 RX ORDER — ERGOCALCIFEROL 1.25 MG/1
CAPSULE ORAL
Qty: 4 CAPSULE | Refills: 3 | Status: SHIPPED | OUTPATIENT
Start: 2018-10-12 | End: 2018-11-13 | Stop reason: SDUPTHER

## 2018-10-12 RX ORDER — FAMOTIDINE 20 MG/1
TABLET, FILM COATED ORAL
Qty: 60 TABLET | Refills: 3 | Status: SHIPPED | OUTPATIENT
Start: 2018-10-12 | End: 2019-03-08 | Stop reason: SDUPTHER

## 2018-10-22 RX ORDER — ATORVASTATIN CALCIUM 40 MG/1
TABLET, FILM COATED ORAL
Qty: 30 TABLET | Refills: 0 | Status: SHIPPED | OUTPATIENT
Start: 2018-10-22 | End: 2018-11-13 | Stop reason: SDUPTHER

## 2018-10-22 NOTE — TELEPHONE ENCOUNTER
Requested Prescriptions     Pending Prescriptions Disp Refills    metFORMIN (GLUCOPHAGE) 1000 MG tablet 60 tablet 0    atorvastatin (LIPITOR) 40 MG tablet 30 tablet 0

## 2018-10-27 RX ORDER — HYDROCHLOROTHIAZIDE 25 MG/1
TABLET ORAL
Qty: 30 TABLET | Refills: 3 | Status: SHIPPED | OUTPATIENT
Start: 2018-10-27 | End: 2018-11-20 | Stop reason: SDUPTHER

## 2018-10-27 RX ORDER — HYDROCHLOROTHIAZIDE 25 MG/1
TABLET ORAL
Qty: 30 TABLET | Refills: 3 | Status: SHIPPED | OUTPATIENT
Start: 2018-10-27 | End: 2018-10-27 | Stop reason: SDUPTHER

## 2018-11-09 ENCOUNTER — TELEPHONE (OUTPATIENT)
Dept: PRIMARY CARE CLINIC | Age: 68
End: 2018-11-09

## 2018-11-09 NOTE — TELEPHONE ENCOUNTER
Patient is calling needing two Handicap prescriptions from Dr. Mary Bonilla. Please call patient and advise.

## 2018-11-13 DIAGNOSIS — I10 ESSENTIAL HYPERTENSION: ICD-10-CM

## 2018-11-14 RX ORDER — ATORVASTATIN CALCIUM 40 MG/1
TABLET, FILM COATED ORAL
Qty: 30 TABLET | Refills: 0 | Status: SHIPPED | OUTPATIENT
Start: 2018-11-14 | End: 2018-12-26 | Stop reason: SDUPTHER

## 2018-11-14 RX ORDER — AMLODIPINE BESYLATE 10 MG/1
TABLET ORAL
Qty: 30 TABLET | Refills: 0 | Status: SHIPPED | OUTPATIENT
Start: 2018-11-14 | End: 2018-12-26 | Stop reason: SDUPTHER

## 2018-11-14 RX ORDER — ERGOCALCIFEROL 1.25 MG/1
CAPSULE ORAL
Qty: 1 CAPSULE | Refills: 0 | Status: SHIPPED | OUTPATIENT
Start: 2018-11-14 | End: 2018-12-26 | Stop reason: SDUPTHER

## 2018-11-18 DIAGNOSIS — I10 ESSENTIAL HYPERTENSION: ICD-10-CM

## 2018-11-20 RX ORDER — HYDROCHLOROTHIAZIDE 25 MG/1
TABLET ORAL
Qty: 15 TABLET | Refills: 0 | Status: SHIPPED | OUTPATIENT
Start: 2018-11-20 | End: 2019-03-08 | Stop reason: SDUPTHER

## 2018-12-14 ENCOUNTER — OFFICE VISIT (OUTPATIENT)
Dept: PRIMARY CARE CLINIC | Age: 68
End: 2018-12-14
Payer: COMMERCIAL

## 2018-12-14 VITALS
SYSTOLIC BLOOD PRESSURE: 120 MMHG | OXYGEN SATURATION: 95 % | HEART RATE: 77 BPM | WEIGHT: 220 LBS | BODY MASS INDEX: 43.19 KG/M2 | TEMPERATURE: 97.2 F | HEIGHT: 60 IN | DIASTOLIC BLOOD PRESSURE: 71 MMHG

## 2018-12-14 DIAGNOSIS — E11.9 TYPE 2 DIABETES MELLITUS WITHOUT COMPLICATION, WITH LONG-TERM CURRENT USE OF INSULIN (HCC): Primary | ICD-10-CM

## 2018-12-14 DIAGNOSIS — I10 ESSENTIAL HYPERTENSION: ICD-10-CM

## 2018-12-14 DIAGNOSIS — E78.00 PURE HYPERCHOLESTEROLEMIA: ICD-10-CM

## 2018-12-14 DIAGNOSIS — J45.40 MODERATE PERSISTENT ASTHMA WITHOUT COMPLICATION: ICD-10-CM

## 2018-12-14 DIAGNOSIS — Z79.4 TYPE 2 DIABETES MELLITUS WITHOUT COMPLICATION, WITH LONG-TERM CURRENT USE OF INSULIN (HCC): Primary | ICD-10-CM

## 2018-12-14 DIAGNOSIS — Z23 NEED FOR SHINGLES VACCINE: ICD-10-CM

## 2018-12-14 DIAGNOSIS — Z12.31 ENCOUNTER FOR MAMMOGRAM TO ESTABLISH BASELINE MAMMOGRAM: ICD-10-CM

## 2018-12-14 PROCEDURE — 99214 OFFICE O/P EST MOD 30 MIN: CPT | Performed by: FAMILY MEDICINE

## 2018-12-14 ASSESSMENT — ENCOUNTER SYMPTOMS
DIFFICULTY BREATHING: 0
HEMOPTYSIS: 0
HOARSE VOICE: 0
TROUBLE SWALLOWING: 0
COUGH: 0
WHEEZING: 0
BLURRED VISION: 0
HEARTBURN: 0
CHEST TIGHTNESS: 0
SPUTUM PRODUCTION: 0
SORE THROAT: 0
FREQUENT THROAT CLEARING: 0
SHORTNESS OF BREATH: 0
RHINORRHEA: 0
VISUAL CHANGE: 0

## 2018-12-26 DIAGNOSIS — I10 ESSENTIAL HYPERTENSION: ICD-10-CM

## 2018-12-27 RX ORDER — ATORVASTATIN CALCIUM 40 MG/1
TABLET, FILM COATED ORAL
Qty: 30 TABLET | Refills: 3 | Status: SHIPPED | OUTPATIENT
Start: 2018-12-27 | End: 2019-03-08 | Stop reason: SDUPTHER

## 2018-12-27 RX ORDER — AMLODIPINE BESYLATE 10 MG/1
TABLET ORAL
Qty: 30 TABLET | Refills: 3 | Status: SHIPPED | OUTPATIENT
Start: 2018-12-27 | End: 2019-05-07 | Stop reason: SDUPTHER

## 2018-12-27 RX ORDER — PEN NEEDLE, DIABETIC 31 GX5/16"
NEEDLE, DISPOSABLE MISCELLANEOUS
Qty: 100 EACH | Refills: 3 | Status: SHIPPED | OUTPATIENT
Start: 2018-12-27 | End: 2019-09-05 | Stop reason: SDUPTHER

## 2018-12-27 RX ORDER — ERGOCALCIFEROL 1.25 MG/1
CAPSULE ORAL
Qty: 1 CAPSULE | Refills: 3 | Status: SHIPPED | OUTPATIENT
Start: 2018-12-27 | End: 2019-03-08 | Stop reason: SDUPTHER

## 2019-01-10 ENCOUNTER — TELEPHONE (OUTPATIENT)
Dept: PRIMARY CARE CLINIC | Age: 69
End: 2019-01-10

## 2019-01-11 DIAGNOSIS — I10 ESSENTIAL HYPERTENSION: Primary | ICD-10-CM

## 2019-01-11 RX ORDER — LORATADINE 10 MG/1
TABLET ORAL
Qty: 30 TABLET | Refills: 5 | Status: SHIPPED | OUTPATIENT
Start: 2019-01-11 | End: 2019-03-08 | Stop reason: SDUPTHER

## 2019-01-11 RX ORDER — GLUCOSAMINE HCL/CHONDROITIN SU 500-400 MG
CAPSULE ORAL
Qty: 100 STRIP | Refills: 11 | Status: SHIPPED | OUTPATIENT
Start: 2019-01-11 | End: 2019-05-06 | Stop reason: SDUPTHER

## 2019-01-11 RX ORDER — ALENDRONATE SODIUM 70 MG/1
TABLET ORAL
Qty: 4 TABLET | Refills: 5 | Status: SHIPPED | OUTPATIENT
Start: 2019-01-11 | End: 2019-03-08 | Stop reason: SDUPTHER

## 2019-02-02 RX ORDER — BUDESONIDE AND FORMOTEROL FUMARATE DIHYDRATE 160; 4.5 UG/1; UG/1
2 AEROSOL RESPIRATORY (INHALATION) 2 TIMES DAILY
Qty: 1 INHALER | Refills: 3 | Status: SHIPPED | OUTPATIENT
Start: 2019-02-02 | End: 2019-06-05 | Stop reason: SDUPTHER

## 2019-02-15 RX ORDER — ALBUTEROL SULFATE 2.5 MG/3ML
SOLUTION RESPIRATORY (INHALATION)
Qty: 270 ML | Refills: 0 | Status: SHIPPED | OUTPATIENT
Start: 2019-02-15 | End: 2019-04-02 | Stop reason: SDUPTHER

## 2019-03-07 ENCOUNTER — TELEPHONE (OUTPATIENT)
Dept: PRIMARY CARE CLINIC | Age: 69
End: 2019-03-07

## 2019-03-07 DIAGNOSIS — E11.9 CONTROLLED TYPE 2 DIABETES MELLITUS WITHOUT COMPLICATION, WITHOUT LONG-TERM CURRENT USE OF INSULIN (HCC): ICD-10-CM

## 2019-03-08 RX ORDER — LANCETS
EACH MISCELLANEOUS
Qty: 100 EACH | Refills: 5 | Status: SHIPPED | OUTPATIENT
Start: 2019-03-08

## 2019-03-08 RX ORDER — ATORVASTATIN CALCIUM 40 MG/1
TABLET, FILM COATED ORAL
Qty: 30 TABLET | Refills: 3 | Status: SHIPPED | OUTPATIENT
Start: 2019-03-08 | End: 2019-05-06 | Stop reason: SDUPTHER

## 2019-03-08 RX ORDER — LORATADINE 10 MG/1
TABLET ORAL
Qty: 30 TABLET | Refills: 0 | Status: SHIPPED | OUTPATIENT
Start: 2019-03-08 | End: 2019-04-02 | Stop reason: SDUPTHER

## 2019-03-08 RX ORDER — ALENDRONATE SODIUM 35 MG/1
TABLET ORAL
Qty: 8 TABLET | Refills: 0 | Status: SHIPPED | OUTPATIENT
Start: 2019-03-08 | End: 2019-04-02 | Stop reason: SDUPTHER

## 2019-03-08 RX ORDER — LORATADINE 10 MG/1
TABLET ORAL
Qty: 30 TABLET | Refills: 5 | Status: SHIPPED | OUTPATIENT
Start: 2019-03-08 | End: 2019-04-05 | Stop reason: SDUPTHER

## 2019-03-08 RX ORDER — ERGOCALCIFEROL 1.25 MG/1
CAPSULE ORAL
Qty: 1 CAPSULE | Refills: 3 | Status: SHIPPED | OUTPATIENT
Start: 2019-03-08 | End: 2019-05-06 | Stop reason: SDUPTHER

## 2019-03-08 RX ORDER — HYDROCHLOROTHIAZIDE 25 MG/1
TABLET ORAL
Qty: 15 TABLET | Refills: 3 | Status: SHIPPED | OUTPATIENT
Start: 2019-03-08 | End: 2019-04-03 | Stop reason: SDUPTHER

## 2019-03-08 RX ORDER — FAMOTIDINE 20 MG/1
TABLET, FILM COATED ORAL
Qty: 60 TABLET | Refills: 3 | Status: SHIPPED | OUTPATIENT
Start: 2019-03-08 | End: 2019-04-02 | Stop reason: SDUPTHER

## 2019-03-29 ENCOUNTER — OFFICE VISIT (OUTPATIENT)
Dept: PRIMARY CARE CLINIC | Age: 69
End: 2019-03-29
Payer: COMMERCIAL

## 2019-03-29 VITALS
HEIGHT: 60 IN | OXYGEN SATURATION: 95 % | HEART RATE: 70 BPM | SYSTOLIC BLOOD PRESSURE: 130 MMHG | BODY MASS INDEX: 43.19 KG/M2 | DIASTOLIC BLOOD PRESSURE: 80 MMHG | WEIGHT: 220 LBS

## 2019-03-29 DIAGNOSIS — M17.0 PRIMARY OSTEOARTHRITIS OF BOTH KNEES: ICD-10-CM

## 2019-03-29 DIAGNOSIS — E11.9 TYPE 2 DIABETES MELLITUS WITHOUT COMPLICATION, WITH LONG-TERM CURRENT USE OF INSULIN (HCC): ICD-10-CM

## 2019-03-29 DIAGNOSIS — M51.36 DDD (DEGENERATIVE DISC DISEASE), LUMBAR: ICD-10-CM

## 2019-03-29 DIAGNOSIS — Z79.4 TYPE 2 DIABETES MELLITUS WITHOUT COMPLICATION, WITH LONG-TERM CURRENT USE OF INSULIN (HCC): ICD-10-CM

## 2019-03-29 DIAGNOSIS — E78.00 PURE HYPERCHOLESTEROLEMIA: ICD-10-CM

## 2019-03-29 DIAGNOSIS — I10 ESSENTIAL HYPERTENSION: ICD-10-CM

## 2019-03-29 LAB
GLUCOSE BLD-MCNC: 140 MG/DL
HBA1C MFR BLD: 7.1 %
HCT VFR BLD CALC: 36.8 % (ref 36–48)
HEMOGLOBIN: 11.8 G/DL (ref 12–16)
MCH RBC QN AUTO: 26.6 PG (ref 26–34)
MCHC RBC AUTO-ENTMCNC: 32 G/DL (ref 31–36)
MCV RBC AUTO: 83 FL (ref 80–100)
PDW BLD-RTO: 15 % (ref 12.4–15.4)
PLATELET # BLD: 336 K/UL (ref 135–450)
PMV BLD AUTO: 8.7 FL (ref 5–10.5)
RBC # BLD: 4.43 M/UL (ref 4–5.2)
WBC # BLD: 8.8 K/UL (ref 4–11)

## 2019-03-29 PROCEDURE — G0444 DEPRESSION SCREEN ANNUAL: HCPCS | Performed by: FAMILY MEDICINE

## 2019-03-29 PROCEDURE — 99214 OFFICE O/P EST MOD 30 MIN: CPT | Performed by: FAMILY MEDICINE

## 2019-03-29 PROCEDURE — 83036 HEMOGLOBIN GLYCOSYLATED A1C: CPT | Performed by: FAMILY MEDICINE

## 2019-03-29 PROCEDURE — 82962 GLUCOSE BLOOD TEST: CPT | Performed by: FAMILY MEDICINE

## 2019-03-29 RX ORDER — ACETAMINOPHEN 500 MG
500 TABLET ORAL 4 TIMES DAILY PRN
Qty: 360 TABLET | Refills: 1 | Status: SHIPPED | OUTPATIENT
Start: 2019-03-29 | End: 2019-08-16 | Stop reason: SDUPTHER

## 2019-03-29 ASSESSMENT — PATIENT HEALTH QUESTIONNAIRE - PHQ9
2. FEELING DOWN, DEPRESSED OR HOPELESS: 2
10. IF YOU CHECKED OFF ANY PROBLEMS, HOW DIFFICULT HAVE THESE PROBLEMS MADE IT FOR YOU TO DO YOUR WORK, TAKE CARE OF THINGS AT HOME, OR GET ALONG WITH OTHER PEOPLE: 0
1. LITTLE INTEREST OR PLEASURE IN DOING THINGS: 2
SUM OF ALL RESPONSES TO PHQ9 QUESTIONS 1 & 2: 4
SUM OF ALL RESPONSES TO PHQ QUESTIONS 1-9: 7
7. TROUBLE CONCENTRATING ON THINGS, SUCH AS READING THE NEWSPAPER OR WATCHING TELEVISION: 0
5. POOR APPETITE OR OVEREATING: 0
3. TROUBLE FALLING OR STAYING ASLEEP: 0
6. FEELING BAD ABOUT YOURSELF - OR THAT YOU ARE A FAILURE OR HAVE LET YOURSELF OR YOUR FAMILY DOWN: 0
9. THOUGHTS THAT YOU WOULD BE BETTER OFF DEAD, OR OF HURTING YOURSELF: 0
4. FEELING TIRED OR HAVING LITTLE ENERGY: 3
8. MOVING OR SPEAKING SO SLOWLY THAT OTHER PEOPLE COULD HAVE NOTICED. OR THE OPPOSITE, BEING SO FIGETY OR RESTLESS THAT YOU HAVE BEEN MOVING AROUND A LOT MORE THAN USUAL: 0
SUM OF ALL RESPONSES TO PHQ QUESTIONS 1-9: 7

## 2019-03-29 ASSESSMENT — ENCOUNTER SYMPTOMS
BLURRED VISION: 0
BACK PAIN: 1
ABDOMINAL PAIN: 0
VISUAL CHANGE: 0

## 2019-03-30 LAB
A/G RATIO: 1.5 (ref 1.1–2.2)
ALBUMIN SERPL-MCNC: 4.4 G/DL (ref 3.4–5)
ALP BLD-CCNC: 90 U/L (ref 40–129)
ALT SERPL-CCNC: 13 U/L (ref 10–40)
ANION GAP SERPL CALCULATED.3IONS-SCNC: 13 MMOL/L (ref 3–16)
AST SERPL-CCNC: 13 U/L (ref 15–37)
BILIRUB SERPL-MCNC: 0.3 MG/DL (ref 0–1)
BUN BLDV-MCNC: 13 MG/DL (ref 7–20)
CALCIUM SERPL-MCNC: 9.7 MG/DL (ref 8.3–10.6)
CHLORIDE BLD-SCNC: 97 MMOL/L (ref 99–110)
CHOLESTEROL, TOTAL: 103 MG/DL (ref 0–199)
CO2: 31 MMOL/L (ref 21–32)
CREAT SERPL-MCNC: 0.8 MG/DL (ref 0.6–1.2)
GFR AFRICAN AMERICAN: >60
GFR NON-AFRICAN AMERICAN: >60
GLOBULIN: 2.9 G/DL
GLUCOSE BLD-MCNC: 160 MG/DL (ref 70–99)
HDLC SERPL-MCNC: 57 MG/DL (ref 40–60)
LDL CHOLESTEROL CALCULATED: 36 MG/DL
POTASSIUM SERPL-SCNC: 4 MMOL/L (ref 3.5–5.1)
SODIUM BLD-SCNC: 141 MMOL/L (ref 136–145)
TOTAL PROTEIN: 7.3 G/DL (ref 6.4–8.2)
TRIGL SERPL-MCNC: 52 MG/DL (ref 0–150)
VLDLC SERPL CALC-MCNC: 10 MG/DL

## 2019-04-01 ENCOUNTER — TELEPHONE (OUTPATIENT)
Dept: PRIMARY CARE CLINIC | Age: 69
End: 2019-04-01

## 2019-04-01 NOTE — TELEPHONE ENCOUNTER
I want the new prescription to go to [place] [facility]. She does not need to send the script  I gave to her since it is different.  Let her know

## 2019-04-02 DIAGNOSIS — J45.40 MODERATE PERSISTENT ASTHMA WITHOUT COMPLICATION: ICD-10-CM

## 2019-04-02 NOTE — TELEPHONE ENCOUNTER
[facility] said they received a fax from us but the only thing that came through was the cover sheet. They did not get the order.      Please refax to attention of Fallon Garza @ [phone]

## 2019-04-03 RX ORDER — HYDROCHLOROTHIAZIDE 25 MG/1
TABLET ORAL
Qty: 15 TABLET | Refills: 0 | Status: SHIPPED | OUTPATIENT
Start: 2019-04-03 | End: 2019-04-05 | Stop reason: SDUPTHER

## 2019-04-05 RX ORDER — FAMOTIDINE 20 MG/1
TABLET, FILM COATED ORAL
Qty: 60 TABLET | Refills: 0 | Status: SHIPPED | OUTPATIENT
Start: 2019-04-05 | End: 2019-05-06 | Stop reason: SDUPTHER

## 2019-04-05 RX ORDER — HYDROCHLOROTHIAZIDE 50 MG/1
TABLET ORAL
Qty: 15 TABLET | Refills: 0 | Status: SHIPPED | OUTPATIENT
Start: 2019-04-05 | End: 2019-05-06 | Stop reason: SDUPTHER

## 2019-04-05 RX ORDER — ALBUTEROL SULFATE 2.5 MG/3ML
SOLUTION RESPIRATORY (INHALATION)
Qty: 270 ML | Refills: 0 | Status: SHIPPED | OUTPATIENT
Start: 2019-04-05 | End: 2019-05-07 | Stop reason: SDUPTHER

## 2019-04-05 RX ORDER — LORATADINE 10 MG/1
TABLET ORAL
Qty: 30 TABLET | Refills: 0 | Status: SHIPPED | OUTPATIENT
Start: 2019-04-05 | End: 2019-06-05 | Stop reason: SDUPTHER

## 2019-04-05 RX ORDER — ALENDRONATE SODIUM 35 MG/1
TABLET ORAL
Qty: 8 TABLET | Refills: 0 | Status: SHIPPED | OUTPATIENT
Start: 2019-04-05 | End: 2019-05-06 | Stop reason: SDUPTHER

## 2019-04-08 ENCOUNTER — TELEPHONE (OUTPATIENT)
Dept: PRIMARY CARE CLINIC | Age: 69
End: 2019-04-08

## 2019-04-08 NOTE — TELEPHONE ENCOUNTER
Pt was advised to [place] [facility]- pt' states they will not help her.     Pt will find someone and call back to send ov notes

## 2019-04-08 NOTE — TELEPHONE ENCOUNTER
Patient is calling about getting a hospital bed .  She has the prescription but Dr. Raymond Holland needs to talk to the therapist . She is not sure what to do. cb pt and also needs meds refills too

## 2019-04-24 DIAGNOSIS — J45.40 MODERATE PERSISTENT ASTHMA, UNSPECIFIED WHETHER COMPLICATED: Primary | ICD-10-CM

## 2019-04-24 RX ORDER — MONTELUKAST SODIUM 10 MG/1
10 TABLET ORAL DAILY
Qty: 30 TABLET | Refills: 10 | Status: SHIPPED | OUTPATIENT
Start: 2019-04-24 | End: 2019-12-02 | Stop reason: SDUPTHER

## 2019-05-06 DIAGNOSIS — Z79.4 TYPE 2 DIABETES MELLITUS WITHOUT COMPLICATION, WITH LONG-TERM CURRENT USE OF INSULIN (HCC): ICD-10-CM

## 2019-05-06 DIAGNOSIS — E11.9 TYPE 2 DIABETES MELLITUS WITHOUT COMPLICATION, WITH LONG-TERM CURRENT USE OF INSULIN (HCC): ICD-10-CM

## 2019-05-06 DIAGNOSIS — E11.9 CONTROLLED TYPE 2 DIABETES MELLITUS WITHOUT COMPLICATION, WITHOUT LONG-TERM CURRENT USE OF INSULIN (HCC): ICD-10-CM

## 2019-05-06 DIAGNOSIS — I10 ESSENTIAL HYPERTENSION: ICD-10-CM

## 2019-05-06 RX ORDER — FAMOTIDINE 20 MG/1
TABLET, FILM COATED ORAL
Qty: 60 TABLET | Refills: 2 | Status: SHIPPED | OUTPATIENT
Start: 2019-05-06 | End: 2019-07-19 | Stop reason: SDUPTHER

## 2019-05-06 RX ORDER — PRAVASTATIN SODIUM 10 MG
10 TABLET ORAL DAILY
Qty: 30 TABLET | Refills: 3 | Status: SHIPPED | OUTPATIENT
Start: 2019-05-06 | End: 2019-10-30 | Stop reason: SDUPTHER

## 2019-05-06 RX ORDER — ATORVASTATIN CALCIUM 40 MG/1
TABLET, FILM COATED ORAL
Qty: 30 TABLET | Refills: 3 | Status: SHIPPED | OUTPATIENT
Start: 2019-05-06 | End: 2019-06-25 | Stop reason: SDUPTHER

## 2019-05-06 RX ORDER — ERGOCALCIFEROL 1.25 MG/1
CAPSULE ORAL
Qty: 1 CAPSULE | Refills: 3 | Status: SHIPPED | OUTPATIENT
Start: 2019-05-06 | End: 2019-09-09 | Stop reason: SDUPTHER

## 2019-05-06 RX ORDER — HYDROCHLOROTHIAZIDE 50 MG/1
TABLET ORAL
Qty: 15 TABLET | Refills: 0 | Status: SHIPPED | OUTPATIENT
Start: 2019-05-06 | End: 2019-06-05 | Stop reason: SDUPTHER

## 2019-05-06 RX ORDER — GLUCOSAMINE HCL/CHONDROITIN SU 500-400 MG
CAPSULE ORAL
Qty: 100 STRIP | Refills: 11 | Status: SHIPPED | OUTPATIENT
Start: 2019-05-06 | End: 2019-09-09 | Stop reason: SDUPTHER

## 2019-05-06 RX ORDER — ALENDRONATE SODIUM 35 MG/1
TABLET ORAL
Qty: 8 TABLET | Refills: 0 | Status: SHIPPED | OUTPATIENT
Start: 2019-05-06 | End: 2019-06-05 | Stop reason: SDUPTHER

## 2019-05-06 RX ORDER — BLOOD PRESSURE TEST KIT
KIT MISCELLANEOUS
Qty: 200 EACH | Refills: 5 | Status: SHIPPED | OUTPATIENT
Start: 2019-05-06

## 2019-05-07 DIAGNOSIS — I10 ESSENTIAL HYPERTENSION: ICD-10-CM

## 2019-05-07 RX ORDER — AMLODIPINE BESYLATE 10 MG/1
TABLET ORAL
Qty: 30 TABLET | Refills: 0 | Status: SHIPPED | OUTPATIENT
Start: 2019-05-07 | End: 2019-06-05 | Stop reason: SDUPTHER

## 2019-05-07 RX ORDER — ALBUTEROL SULFATE 2.5 MG/3ML
SOLUTION RESPIRATORY (INHALATION)
Qty: 270 ML | Refills: 0 | Status: SHIPPED | OUTPATIENT
Start: 2019-05-07 | End: 2019-09-09 | Stop reason: SDUPTHER

## 2019-06-05 DIAGNOSIS — I10 ESSENTIAL HYPERTENSION: ICD-10-CM

## 2019-06-05 RX ORDER — LORATADINE 10 MG/1
TABLET ORAL
Qty: 30 TABLET | Refills: 0 | Status: SHIPPED | OUTPATIENT
Start: 2019-06-05 | End: 2019-07-11 | Stop reason: SDUPTHER

## 2019-06-05 RX ORDER — ALENDRONATE SODIUM 70 MG/1
TABLET ORAL
Qty: 4 TABLET | Refills: 0 | Status: SHIPPED | OUTPATIENT
Start: 2019-06-05 | End: 2019-07-11 | Stop reason: SDUPTHER

## 2019-06-05 RX ORDER — BUDESONIDE AND FORMOTEROL FUMARATE DIHYDRATE 160; 4.5 UG/1; UG/1
AEROSOL RESPIRATORY (INHALATION)
Qty: 30 G | Refills: 3 | Status: SHIPPED | OUTPATIENT
Start: 2019-06-05 | End: 2019-06-25 | Stop reason: SDUPTHER

## 2019-06-05 RX ORDER — AMLODIPINE BESYLATE 10 MG/1
TABLET ORAL
Qty: 30 TABLET | Refills: 0 | Status: SHIPPED | OUTPATIENT
Start: 2019-06-05 | End: 2019-06-25 | Stop reason: SDUPTHER

## 2019-06-05 RX ORDER — HYDROCHLOROTHIAZIDE 25 MG/1
TABLET ORAL
Qty: 30 TABLET | Refills: 0 | Status: SHIPPED | OUTPATIENT
Start: 2019-06-05 | End: 2019-06-25 | Stop reason: SDUPTHER

## 2019-06-14 ENCOUNTER — TELEPHONE (OUTPATIENT)
Dept: PRIMARY CARE CLINIC | Age: 69
End: 2019-06-14

## 2019-06-25 DIAGNOSIS — I10 ESSENTIAL HYPERTENSION: ICD-10-CM

## 2019-06-25 DIAGNOSIS — Z79.4 TYPE 2 DIABETES MELLITUS WITHOUT COMPLICATION, WITH LONG-TERM CURRENT USE OF INSULIN (HCC): ICD-10-CM

## 2019-06-25 DIAGNOSIS — E11.9 TYPE 2 DIABETES MELLITUS WITHOUT COMPLICATION, WITH LONG-TERM CURRENT USE OF INSULIN (HCC): ICD-10-CM

## 2019-06-25 RX ORDER — AMLODIPINE BESYLATE 10 MG/1
TABLET ORAL
Qty: 30 TABLET | Refills: 0 | Status: SHIPPED | OUTPATIENT
Start: 2019-06-25 | End: 2019-09-05 | Stop reason: SDUPTHER

## 2019-06-25 RX ORDER — ATORVASTATIN CALCIUM 40 MG/1
TABLET, FILM COATED ORAL
Qty: 30 TABLET | Refills: 3 | Status: SHIPPED | OUTPATIENT
Start: 2019-06-25 | End: 2019-09-09 | Stop reason: SDUPTHER

## 2019-06-25 RX ORDER — BUDESONIDE AND FORMOTEROL FUMARATE DIHYDRATE 160; 4.5 UG/1; UG/1
2 AEROSOL RESPIRATORY (INHALATION) 2 TIMES DAILY
Qty: 30 G | Refills: 3 | Status: SHIPPED | OUTPATIENT
Start: 2019-06-25 | End: 2019-07-19 | Stop reason: SDUPTHER

## 2019-06-25 RX ORDER — HYDROCHLOROTHIAZIDE 25 MG/1
TABLET ORAL
Qty: 30 TABLET | Refills: 0 | Status: SHIPPED | OUTPATIENT
Start: 2019-06-25 | End: 2019-09-05 | Stop reason: SDUPTHER

## 2019-07-11 RX ORDER — LORATADINE 10 MG/1
TABLET ORAL
Qty: 30 TABLET | Refills: 0 | Status: SHIPPED | OUTPATIENT
Start: 2019-07-11 | End: 2019-08-07 | Stop reason: SDUPTHER

## 2019-07-11 RX ORDER — ALENDRONATE SODIUM 70 MG/1
TABLET ORAL
Qty: 4 TABLET | Refills: 0 | Status: SHIPPED | OUTPATIENT
Start: 2019-07-11 | End: 2019-09-05 | Stop reason: SDUPTHER

## 2019-07-19 ENCOUNTER — OFFICE VISIT (OUTPATIENT)
Dept: PRIMARY CARE CLINIC | Age: 69
End: 2019-07-19
Payer: COMMERCIAL

## 2019-07-19 VITALS
HEART RATE: 75 BPM | BODY MASS INDEX: 42.76 KG/M2 | DIASTOLIC BLOOD PRESSURE: 70 MMHG | HEIGHT: 60 IN | WEIGHT: 217.8 LBS | SYSTOLIC BLOOD PRESSURE: 130 MMHG | OXYGEN SATURATION: 97 %

## 2019-07-19 DIAGNOSIS — Z79.4 TYPE 2 DIABETES MELLITUS WITHOUT COMPLICATION, WITH LONG-TERM CURRENT USE OF INSULIN (HCC): ICD-10-CM

## 2019-07-19 DIAGNOSIS — I10 ESSENTIAL HYPERTENSION: ICD-10-CM

## 2019-07-19 DIAGNOSIS — E11.9 TYPE 2 DIABETES MELLITUS WITHOUT COMPLICATION, WITH LONG-TERM CURRENT USE OF INSULIN (HCC): ICD-10-CM

## 2019-07-19 DIAGNOSIS — N18.30 CKD (CHRONIC KIDNEY DISEASE) STAGE 3, GFR 30-59 ML/MIN (HCC): ICD-10-CM

## 2019-07-19 DIAGNOSIS — E11.9 TYPE 2 DIABETES MELLITUS WITHOUT COMPLICATION, WITH LONG-TERM CURRENT USE OF INSULIN (HCC): Primary | ICD-10-CM

## 2019-07-19 DIAGNOSIS — K21.9 GASTROESOPHAGEAL REFLUX DISEASE WITHOUT ESOPHAGITIS: ICD-10-CM

## 2019-07-19 DIAGNOSIS — J45.40 MODERATE PERSISTENT ASTHMA WITHOUT COMPLICATION: ICD-10-CM

## 2019-07-19 DIAGNOSIS — Z79.4 TYPE 2 DIABETES MELLITUS WITHOUT COMPLICATION, WITH LONG-TERM CURRENT USE OF INSULIN (HCC): Primary | ICD-10-CM

## 2019-07-19 LAB
A/G RATIO: 1.6 (ref 1.1–2.2)
ALBUMIN SERPL-MCNC: 4.6 G/DL (ref 3.4–5)
ALP BLD-CCNC: 103 U/L (ref 40–129)
ALT SERPL-CCNC: 16 U/L (ref 10–40)
ANION GAP SERPL CALCULATED.3IONS-SCNC: 21 MMOL/L (ref 3–16)
AST SERPL-CCNC: 16 U/L (ref 15–37)
BILIRUB SERPL-MCNC: 0.4 MG/DL (ref 0–1)
BUN BLDV-MCNC: 17 MG/DL (ref 7–20)
CALCIUM SERPL-MCNC: 10.1 MG/DL (ref 8.3–10.6)
CHLORIDE BLD-SCNC: 100 MMOL/L (ref 99–110)
CO2: 24 MMOL/L (ref 21–32)
CREAT SERPL-MCNC: 1.2 MG/DL (ref 0.6–1.2)
GFR AFRICAN AMERICAN: 54
GFR NON-AFRICAN AMERICAN: 44
GLOBULIN: 2.9 G/DL
GLUCOSE BLD-MCNC: 103 MG/DL (ref 70–99)
HBA1C MFR BLD: 7 %
HCT VFR BLD CALC: 38.8 % (ref 36–48)
HEMOGLOBIN: 12.6 G/DL (ref 12–16)
MCH RBC QN AUTO: 27.5 PG (ref 26–34)
MCHC RBC AUTO-ENTMCNC: 32.4 G/DL (ref 31–36)
MCV RBC AUTO: 85 FL (ref 80–100)
PDW BLD-RTO: 15.5 % (ref 12.4–15.4)
PLATELET # BLD: 335 K/UL (ref 135–450)
PMV BLD AUTO: 8.4 FL (ref 5–10.5)
POTASSIUM SERPL-SCNC: 4.1 MMOL/L (ref 3.5–5.1)
RBC # BLD: 4.56 M/UL (ref 4–5.2)
SODIUM BLD-SCNC: 145 MMOL/L (ref 136–145)
TOTAL PROTEIN: 7.5 G/DL (ref 6.4–8.2)
WBC # BLD: 8.7 K/UL (ref 4–11)

## 2019-07-19 PROCEDURE — 83036 HEMOGLOBIN GLYCOSYLATED A1C: CPT | Performed by: FAMILY MEDICINE

## 2019-07-19 PROCEDURE — 99214 OFFICE O/P EST MOD 30 MIN: CPT | Performed by: FAMILY MEDICINE

## 2019-07-19 RX ORDER — FAMOTIDINE 20 MG/1
TABLET, FILM COATED ORAL
Qty: 60 TABLET | Refills: 2 | Status: SHIPPED | OUTPATIENT
Start: 2019-07-19 | End: 2019-07-19

## 2019-07-19 RX ORDER — BUDESONIDE AND FORMOTEROL FUMARATE DIHYDRATE 160; 4.5 UG/1; UG/1
2 AEROSOL RESPIRATORY (INHALATION) 2 TIMES DAILY
Qty: 30 G | Refills: 3 | Status: SHIPPED | OUTPATIENT
Start: 2019-07-19 | End: 2020-12-16 | Stop reason: SDUPTHER

## 2019-07-19 ASSESSMENT — ENCOUNTER SYMPTOMS
ABDOMINAL DISTENTION: 0
RECTAL PAIN: 0
NAUSEA: 0
EYE PAIN: 0
ORTHOPNEA: 0
DIARRHEA: 0
BACK PAIN: 0
APNEA: 0
PHOTOPHOBIA: 0
ABDOMINAL PAIN: 0
SHORTNESS OF BREATH: 0
COLOR CHANGE: 0
DIFFICULTY BREATHING: 0
TROUBLE SWALLOWING: 0
EYE REDNESS: 0
HEARTBURN: 0
WHEEZING: 0
BLURRED VISION: 0
SINUS PRESSURE: 0
CHOKING: 0
BLOOD IN STOOL: 0
RHINORRHEA: 0
SORE THROAT: 0
CHANGE IN BOWEL HABIT: 0
VISUAL CHANGE: 0
STRIDOR: 0
EYE DISCHARGE: 0
CONSTIPATION: 0
EYE ITCHING: 0
HEMOPTYSIS: 0
VOMITING: 0
SPUTUM PRODUCTION: 0
HOARSE VOICE: 0
COUGH: 0
CHEST TIGHTNESS: 0
FREQUENT THROAT CLEARING: 0

## 2019-08-08 NOTE — TELEPHONE ENCOUNTER
Requested Prescriptions     Pending Prescriptions Disp Refills    loratadine (ALLERGY RELIEF) 10 MG tablet [Pharmacy Med Name: LORATADINE 10MG T(D)] 30 tablet 0     Sig: TAKE 1 CAP (10MG) BY MOUTH EVERY DAY (ALLERGIES)     Last Fill Date:  7/11/19  R:0  Last OV:7/19/19  Next OV: 8/12/19

## 2019-08-09 RX ORDER — LORATADINE 10 MG/1
TABLET ORAL
Qty: 30 TABLET | Refills: 0 | Status: SHIPPED | OUTPATIENT
Start: 2019-08-09 | End: 2019-09-09 | Stop reason: SDUPTHER

## 2019-08-16 ENCOUNTER — OFFICE VISIT (OUTPATIENT)
Dept: PRIMARY CARE CLINIC | Age: 69
End: 2019-08-16
Payer: COMMERCIAL

## 2019-08-16 VITALS
BODY MASS INDEX: 43.31 KG/M2 | SYSTOLIC BLOOD PRESSURE: 167 MMHG | WEIGHT: 220.6 LBS | OXYGEN SATURATION: 96 % | DIASTOLIC BLOOD PRESSURE: 78 MMHG | HEART RATE: 82 BPM | HEIGHT: 60 IN

## 2019-08-16 DIAGNOSIS — G89.29 CHRONIC BILATERAL LOW BACK PAIN WITHOUT SCIATICA: ICD-10-CM

## 2019-08-16 DIAGNOSIS — M54.50 CHRONIC BILATERAL LOW BACK PAIN WITHOUT SCIATICA: ICD-10-CM

## 2019-08-16 DIAGNOSIS — K63.5 POLYP OF COLON, UNSPECIFIED PART OF COLON, UNSPECIFIED TYPE: Primary | ICD-10-CM

## 2019-08-16 PROCEDURE — 99214 OFFICE O/P EST MOD 30 MIN: CPT | Performed by: FAMILY MEDICINE

## 2019-08-16 RX ORDER — TIZANIDINE 2 MG/1
TABLET ORAL
Qty: 30 TABLET | Refills: 0 | Status: SHIPPED | OUTPATIENT
Start: 2019-08-16 | End: 2020-08-31 | Stop reason: SDUPTHER

## 2019-08-16 RX ORDER — ACETAMINOPHEN 500 MG
500 TABLET ORAL 4 TIMES DAILY PRN
Qty: 120 TABLET | Refills: 1 | Status: SHIPPED | OUTPATIENT
Start: 2019-08-16 | End: 2019-09-09 | Stop reason: SDUPTHER

## 2019-08-16 ASSESSMENT — ENCOUNTER SYMPTOMS
BLOOD IN STOOL: 0
EYE DISCHARGE: 0
DIARRHEA: 0
EYE PAIN: 0
APNEA: 0
CONSTIPATION: 0
VOMITING: 0
WHEEZING: 0
RHINORRHEA: 0
NAUSEA: 0
EYE REDNESS: 0
CHEST TIGHTNESS: 0
STRIDOR: 0
EYE ITCHING: 0
CHOKING: 0
BACK PAIN: 1
SORE THROAT: 0
SINUS PRESSURE: 0
TROUBLE SWALLOWING: 0
COUGH: 0
ABDOMINAL PAIN: 0
COLOR CHANGE: 0
BOWEL INCONTINENCE: 0
PHOTOPHOBIA: 0
RECTAL PAIN: 0
SHORTNESS OF BREATH: 0
ABDOMINAL DISTENTION: 0

## 2019-08-16 NOTE — PROGRESS NOTES
Skin: Skin is warm and dry. No rash noted. She is not diaphoretic. No erythema. No pallor. Psychiatric: She has a normal mood and affect. Her behavior is normal. Judgment and thought content normal.   Nursing note and vitals reviewed. Assessment:           1. Chronic bilateral low back pain without sciatica  Recurrent pain from old remote injry  Ck imagining  Add tizanidine 2 mg two tabs prn hs  PT  Re eval 6 weeks  - XR LUMBAR SPINE (2-3 VIEWS); Future  - tiZANidine (ZANAFLEX) 2 MG tablet; One or two tabs prn bedtime  Dispense: 30 tablet; Refill: 0  - acetaminophen (TYLENOL) 500 MG tablet; Take 1 tablet by mouth 4 times daily as needed for Pain  Dispense: 120 tablet; Refill: 1    2.  Polyp of colon, unspecified part of colon, unspecified type    - [facility] - Bentley Gautam MD, Gastroenterology, [facility]       Plan:              Zari Gonzáles MD

## 2019-09-05 DIAGNOSIS — I10 ESSENTIAL HYPERTENSION: ICD-10-CM

## 2019-09-05 RX ORDER — HYDROCHLOROTHIAZIDE 25 MG/1
TABLET ORAL
Qty: 30 TABLET | Refills: 0 | Status: SHIPPED | OUTPATIENT
Start: 2019-09-05 | End: 2019-09-09 | Stop reason: SDUPTHER

## 2019-09-05 RX ORDER — ALENDRONATE SODIUM 70 MG/1
TABLET ORAL
Qty: 4 TABLET | Refills: 0 | Status: SHIPPED | OUTPATIENT
Start: 2019-09-05 | End: 2019-09-30 | Stop reason: SDUPTHER

## 2019-09-05 RX ORDER — AMLODIPINE BESYLATE 10 MG/1
TABLET ORAL
Qty: 30 TABLET | Refills: 0 | Status: SHIPPED | OUTPATIENT
Start: 2019-09-05 | End: 2019-09-09 | Stop reason: SDUPTHER

## 2019-09-05 NOTE — TELEPHONE ENCOUNTER
Patient requesting a medication refill.   Medication metFORMIN (GLUCOPHAGE) 1000 MG tablet   sertraline (ZOLOFT) 50 MG tablet   [facility]  Last office visit: 8/16/19  Next office visit: 11/15/2019

## 2019-09-06 RX ORDER — PEN NEEDLE, DIABETIC 31 GX5/16"
NEEDLE, DISPOSABLE MISCELLANEOUS
Qty: 129 EACH | Refills: 5 | Status: SHIPPED | OUTPATIENT
Start: 2019-09-06

## 2019-09-09 DIAGNOSIS — I10 ESSENTIAL HYPERTENSION: ICD-10-CM

## 2019-09-09 DIAGNOSIS — G89.29 CHRONIC BILATERAL LOW BACK PAIN WITHOUT SCIATICA: ICD-10-CM

## 2019-09-09 DIAGNOSIS — M54.50 CHRONIC BILATERAL LOW BACK PAIN WITHOUT SCIATICA: ICD-10-CM

## 2019-09-09 RX ORDER — LORATADINE 10 MG/1
TABLET ORAL
Qty: 30 TABLET | Refills: 0 | Status: SHIPPED | OUTPATIENT
Start: 2019-09-09 | End: 2019-09-30 | Stop reason: SDUPTHER

## 2019-09-09 RX ORDER — GLUCOSAMINE HCL/CHONDROITIN SU 500-400 MG
CAPSULE ORAL
Qty: 100 STRIP | Refills: 11 | Status: SHIPPED | OUTPATIENT
Start: 2019-09-09 | End: 2019-11-10 | Stop reason: SDUPTHER

## 2019-09-09 RX ORDER — ACETAMINOPHEN 500 MG
500 TABLET ORAL 4 TIMES DAILY PRN
Qty: 120 TABLET | Refills: 1 | Status: SHIPPED | OUTPATIENT
Start: 2019-09-09

## 2019-09-09 RX ORDER — ALBUTEROL SULFATE 2.5 MG/3ML
SOLUTION RESPIRATORY (INHALATION)
Qty: 270 ML | Refills: 0 | Status: SHIPPED | OUTPATIENT
Start: 2019-09-09 | End: 2021-03-09 | Stop reason: SDUPTHER

## 2019-09-09 RX ORDER — AMLODIPINE BESYLATE 10 MG/1
TABLET ORAL
Qty: 30 TABLET | Refills: 5 | Status: SHIPPED | OUTPATIENT
Start: 2019-09-09 | End: 2020-04-01

## 2019-09-09 RX ORDER — ERGOCALCIFEROL 1.25 MG/1
CAPSULE ORAL
Qty: 1 CAPSULE | Refills: 3 | Status: SHIPPED | OUTPATIENT
Start: 2019-09-09 | End: 2019-12-02 | Stop reason: SDUPTHER

## 2019-09-09 RX ORDER — HYDROCHLOROTHIAZIDE 25 MG/1
TABLET ORAL
Qty: 30 TABLET | Refills: 5 | Status: SHIPPED | OUTPATIENT
Start: 2019-09-09 | End: 2020-03-30

## 2019-09-09 RX ORDER — ATORVASTATIN CALCIUM 40 MG/1
TABLET, FILM COATED ORAL
Qty: 30 TABLET | Refills: 5 | Status: SHIPPED | OUTPATIENT
Start: 2019-09-09 | End: 2020-01-26 | Stop reason: SDUPTHER

## 2019-09-30 DIAGNOSIS — Z79.4 TYPE 2 DIABETES MELLITUS WITHOUT COMPLICATION, WITH LONG-TERM CURRENT USE OF INSULIN (HCC): ICD-10-CM

## 2019-09-30 DIAGNOSIS — E11.9 TYPE 2 DIABETES MELLITUS WITHOUT COMPLICATION, WITH LONG-TERM CURRENT USE OF INSULIN (HCC): ICD-10-CM

## 2019-09-30 RX ORDER — LORATADINE 10 MG/1
TABLET ORAL
Qty: 30 TABLET | Refills: 0 | Status: SHIPPED | OUTPATIENT
Start: 2019-09-30 | End: 2019-10-14 | Stop reason: SDUPTHER

## 2019-09-30 RX ORDER — ALENDRONATE SODIUM 70 MG/1
TABLET ORAL
Qty: 4 TABLET | Refills: 1 | Status: SHIPPED | OUTPATIENT
Start: 2019-09-30 | End: 2019-10-14 | Stop reason: SDUPTHER

## 2019-09-30 RX ORDER — FAMOTIDINE 20 MG/1
TABLET, FILM COATED ORAL
Qty: 60 TABLET | Refills: 2 | Status: SHIPPED | OUTPATIENT
Start: 2019-09-30 | End: 2020-01-26 | Stop reason: SDUPTHER

## 2019-10-14 RX ORDER — LORATADINE 10 MG/1
TABLET ORAL
Qty: 30 TABLET | Refills: 0 | Status: SHIPPED | OUTPATIENT
Start: 2019-10-14 | End: 2019-12-02 | Stop reason: SDUPTHER

## 2019-10-14 RX ORDER — ALENDRONATE SODIUM 70 MG/1
TABLET ORAL
Qty: 4 TABLET | Refills: 1 | Status: SHIPPED | OUTPATIENT
Start: 2019-10-14 | End: 2019-12-02 | Stop reason: SDUPTHER

## 2019-10-23 RX ORDER — FLUTICASONE PROPIONATE 50 MCG
1 SPRAY, SUSPENSION (ML) NASAL DAILY
Qty: 1 BOTTLE | Refills: 5 | Status: SHIPPED | OUTPATIENT
Start: 2019-10-23 | End: 2020-12-28 | Stop reason: SDUPTHER

## 2019-10-30 RX ORDER — PRAVASTATIN SODIUM 10 MG
10 TABLET ORAL DAILY
Qty: 30 TABLET | Refills: 3 | Status: SHIPPED | OUTPATIENT
Start: 2019-10-30 | End: 2020-01-21 | Stop reason: DRUGHIGH

## 2019-11-25 ENCOUNTER — TELEPHONE (OUTPATIENT)
Dept: PRIMARY CARE CLINIC | Age: 69
End: 2019-11-25

## 2019-12-13 ENCOUNTER — NURSE ONLY (OUTPATIENT)
Dept: PRIMARY CARE CLINIC | Age: 69
End: 2019-12-13
Payer: COMMERCIAL

## 2019-12-13 PROCEDURE — 90653 IIV ADJUVANT VACCINE IM: CPT | Performed by: FAMILY MEDICINE

## 2019-12-13 PROCEDURE — G0008 ADMIN INFLUENZA VIRUS VAC: HCPCS | Performed by: FAMILY MEDICINE

## 2019-12-27 ENCOUNTER — TELEPHONE (OUTPATIENT)
Dept: PRIMARY CARE CLINIC | Age: 69
End: 2019-12-27

## 2020-01-10 ENCOUNTER — OFFICE VISIT (OUTPATIENT)
Dept: PRIMARY CARE CLINIC | Age: 70
End: 2020-01-10
Payer: COMMERCIAL

## 2020-01-10 VITALS
SYSTOLIC BLOOD PRESSURE: 126 MMHG | WEIGHT: 218.6 LBS | HEART RATE: 97 BPM | BODY MASS INDEX: 42.92 KG/M2 | HEIGHT: 60 IN | DIASTOLIC BLOOD PRESSURE: 71 MMHG | RESPIRATION RATE: 16 BRPM

## 2020-01-10 PROCEDURE — 99214 OFFICE O/P EST MOD 30 MIN: CPT | Performed by: FAMILY MEDICINE

## 2020-01-10 ASSESSMENT — PATIENT HEALTH QUESTIONNAIRE - PHQ9
2. FEELING DOWN, DEPRESSED OR HOPELESS: 0
SUM OF ALL RESPONSES TO PHQ QUESTIONS 1-9: 0
SUM OF ALL RESPONSES TO PHQ9 QUESTIONS 1 & 2: 0
SUM OF ALL RESPONSES TO PHQ QUESTIONS 1-9: 0
1. LITTLE INTEREST OR PLEASURE IN DOING THINGS: 0

## 2020-01-10 ASSESSMENT — ENCOUNTER SYMPTOMS
BLURRED VISION: 0
VISUAL CHANGE: 0
SHORTNESS OF BREATH: 0

## 2020-01-10 NOTE — PROGRESS NOTES
Subjective:      Patient ID: Serg Mariscal is a 68 y.o. female. Diabetes   She presents for her follow-up diabetic visit. She has type 2 diabetes mellitus. No MedicAlert identification noted. The initial diagnosis of diabetes was made 20 years ago. Her disease course has been stable. There are no hypoglycemic associated symptoms. Pertinent negatives for hypoglycemia include no confusion, dizziness, headaches, hunger, mood changes, nervousness/anxiousness, pallor, seizures, sleepiness, speech difficulty, sweats or tremors. There are no diabetic associated symptoms. Pertinent negatives for diabetes include no blurred vision, no chest pain, no fatigue, no foot paresthesias, no foot ulcerations, no polydipsia, no polyphagia, no polyuria, no visual change, no weakness and no weight loss. There are no hypoglycemic complications. Pertinent negatives for hypoglycemia complications include no blackouts, no hospitalization, no nocturnal hypoglycemia, no required assistance and no required glucagon injection. Symptoms are stable. There are no diabetic complications. Pertinent negatives for diabetic complications include no autonomic neuropathy, CVA, heart disease, impotence, nephropathy, peripheral neuropathy, PVD or retinopathy. Current diabetic treatment includes insulin injections and oral agent (monotherapy). She is compliant with treatment all of the time. She has not had a previous visit with a dietitian. She participates in exercise daily. There is no change in her home blood glucose trend. Her breakfast blood glucose range is generally 110-130 mg/dl. Her lunch blood glucose range is generally 110-130 mg/dl. Her dinner blood glucose range is generally 110-130 mg/dl. Her bedtime blood glucose range is generally 110-130 mg/dl. Her overall blood glucose range is 110-130 mg/dl. An ACE inhibitor/angiotensin II receptor blocker is contraindicated. Eye exam is current. Hypertension   This is a chronic problem.  The distress. Appearance: She is well-developed. She is not diaphoretic. HENT:      Head: Normocephalic and atraumatic. Right Ear: External ear normal.      Left Ear: External ear normal.      Nose: Nose normal.      Mouth/Throat:      Pharynx: No oropharyngeal exudate. Eyes:      General: No scleral icterus. Left eye: No discharge. Conjunctiva/sclera: Conjunctivae normal.      Pupils: Pupils are equal, round, and reactive to light. Neck:      Musculoskeletal: Normal range of motion and neck supple. Thyroid: No thyromegaly. Vascular: No JVD. Trachea: No tracheal deviation. Cardiovascular:      Rate and Rhythm: Normal rate and regular rhythm. Heart sounds: Normal heart sounds. No murmur. No friction rub. No gallop. Pulmonary:      Effort: Pulmonary effort is normal. No respiratory distress. Breath sounds: Normal breath sounds. No stridor. No wheezing or rales. Chest:      Chest wall: No tenderness. Abdominal:      General: Bowel sounds are normal. There is no distension. Palpations: Abdomen is soft. There is no mass. Tenderness: There is no tenderness. There is no guarding or rebound. Musculoskeletal: Normal range of motion. General: No tenderness. Lymphadenopathy:      Cervical: No cervical adenopathy. Skin:     General: Skin is warm and dry. Coloration: Skin is not pale. Findings: No erythema or rash. Neurological:      Mental Status: She is alert and oriented to person, place, and time. Cranial Nerves: No cranial nerve deficit. Coordination: Coordination normal.      Deep Tendon Reflexes: Reflexes are normal and symmetric. Reflexes normal.   Psychiatric:         Behavior: Behavior normal.         Thought Content:  Thought content normal.         Judgment: Judgment normal.       Lab Results   Component Value Date    CHOL 103 03/29/2019    CHOL 100 09/14/2018    CHOL 164 09/15/2017     Lab Results   Component Value Date    TRIG 52 03/29/2019    TRIG 49 09/14/2018    TRIG 77 09/15/2017     Lab Results   Component Value Date    HDL 57 03/29/2019    HDL 60 09/14/2018    HDL 72 (H) 09/15/2017     Lab Results   Component Value Date    LDLCALC 36 03/29/2019    LDLCALC 30 09/14/2018    LDLCALC 77 09/15/2017     Lab Results   Component Value Date    LABVLDL 10 03/29/2019    LABVLDL 10 09/14/2018    LABVLDL 15 09/15/2017     No results found for: CHOLHDLRATIO    Visual inspection:  Deformity/amputation: absent  Skin lesions/pre-ulcerative calluses: absent  Edema: right- negative, left- negative    Sensory exam:  Monofilament sensation: normal  (minimum of 5 random plantar locations tested, avoiding callused areas - > 1 area with absence of sensation is + for neuropathy)    Plus at least one of the following:  Pulses: normal,   Pinprick: Intact  Proprioception: Intact  Vibration (128 Hz): Intact    Assessment:     1. Controlled type 2 diabetes mellitus without complication, with long-term current use of insulin ([facility])  AIC 7.0 to 6.5  Goal  < 7  At goal current treatment  - MICROALBUMIN / CREATININE URINE RATIO; Future  -  DIABETES FOOT EXAM  - insulin aspart (NOVOLOG FLEXPEN) 100 UNIT/ML injection pen; Take 4 units with each meal under skin 3 times a day  Dispense: 5 pen; Refill: 3    2. Essential hypertension  BP is at goal <130/80. Will continue current medication and low salt diet. Has not had recent renal function testing      - CBC; Future  - Comprehensive Metabolic Panel; Future  - Urinalysis; Future  - TSH without Reflex  - Lipid Panel    3. Pure hypertriglyceridemia  Last lipids at goal  With current statin treatment  Discussed low fat diet    4. Need for shingles vaccine    - zoster recombinant adjuvanted vaccine [facility]) 50 MCG/0.5ML SUSR injection; Inject 0.5 mLs into the muscle once for 1 dose 2 doses  4-6 months  Dispense: 0.5 mL; Refill: 0  - CBC; Future  - Comprehensive Metabolic Panel;  Future  - Urinalysis;

## 2020-01-14 NOTE — PROGRESS NOTES
Name_______________________________________Printed:____________________  Date and time of surgery__1/30  1100______________________Arrival Time:_0930_______________   1. Do not eat or drink anything after 12 midnight (or____hours) prior to surgery. This includes no water, chewing gum or mints. Endoscopy patients follow your doctors bowel prep instructions,which may include taking part of prep after midnight If you have been instructed on ERAS or carb loading -please follow those instructions. 2. Take the following pills with a small sip of water on the morning of surgery_inhaler singular zoloft metoprolol norvasc__________________________________________________                  Do not take blood pressure medications ending in pril or sartan the tierney prior to surgery or the morning of surgery_   3. Aspirin, Ibuprofen, Advil, Naproxen, Vitamin E and other Anti-inflammatory products and supplements should be stopped for 5 -7days before surgery or as directed by your physician. 4. Check with your Doctor regarding stopping Plavix, Coumadin,Eliquis, Lovenox,Effient,Pradaxa,Xarelto, Fragmin or other blood thinners and follow their instructions. 5. Do not smoke, and do not drink any alcoholic beverages 24 hours prior to surgery. This includes NA Beer. Refrain from the usage of any recreational drugs. 6. You may brush your teeth and gargle the morning of surgery. DO NOT SWALLOW WATER   7. You MUST make arrangements for a responsible adult to stay on site while you are here and take you home after your surgery. You will not be allowed to leave alone or drive yourself home. It is strongly suggested someone stay with you the first 24 hrs. Your surgery will be cancelled if you do not have a ride home. 8. A parent/legal guardian must accompany a child scheduled for surgery and plan to stay at the hospital until the child is discharged. Please do not bring other children with you.    9. Please wear simple, loose fitting

## 2020-01-15 ENCOUNTER — ANESTHESIA EVENT (OUTPATIENT)
Dept: ENDOSCOPY | Age: 70
End: 2020-01-15
Payer: MEDICARE

## 2020-01-21 ENCOUNTER — TELEPHONE (OUTPATIENT)
Dept: PRIMARY CARE CLINIC | Age: 70
End: 2020-01-21

## 2020-01-21 RX ORDER — ALENDRONATE SODIUM 70 MG/1
TABLET ORAL
Qty: 4 TABLET | Refills: 1 | Status: CANCELLED | OUTPATIENT
Start: 2020-01-21

## 2020-01-21 RX ORDER — DOCUSATE SODIUM 100 MG/1
100 CAPSULE, LIQUID FILLED ORAL 2 TIMES DAILY
Qty: 60 CAPSULE | Refills: 2 | Status: SHIPPED | OUTPATIENT
Start: 2020-01-21 | End: 2020-02-20

## 2020-01-21 RX ORDER — PRAVASTATIN SODIUM 10 MG
10 TABLET ORAL DAILY
Qty: 30 TABLET | Refills: 3 | Status: CANCELLED | OUTPATIENT
Start: 2020-01-21

## 2020-01-21 RX ORDER — PRAVASTATIN SODIUM 20 MG
TABLET ORAL
Qty: 30 TABLET | Refills: 12 | Status: SHIPPED | OUTPATIENT
Start: 2020-01-21

## 2020-01-21 RX ORDER — ALENDRONATE SODIUM 70 MG/1
TABLET ORAL
Qty: 4 TABLET | Refills: 12 | Status: SHIPPED | OUTPATIENT
Start: 2020-01-21 | End: 2020-04-01

## 2020-01-26 RX ORDER — FAMOTIDINE 20 MG/1
TABLET, FILM COATED ORAL
Qty: 60 TABLET | Refills: 10 | Status: SHIPPED | OUTPATIENT
Start: 2020-01-26 | End: 2020-07-30 | Stop reason: SDUPTHER

## 2020-01-26 RX ORDER — ATORVASTATIN CALCIUM 40 MG/1
TABLET, FILM COATED ORAL
Qty: 30 TABLET | Refills: 10 | Status: SHIPPED | OUTPATIENT
Start: 2020-01-26 | End: 2020-07-30 | Stop reason: SDUPTHER

## 2020-01-30 ENCOUNTER — HOSPITAL ENCOUNTER (OUTPATIENT)
Age: 70
Setting detail: OUTPATIENT SURGERY
Discharge: HOME | End: 2020-01-30
Attending: INTERNAL MEDICINE | Admitting: INTERNAL MEDICINE
Payer: MEDICARE

## 2020-01-30 ENCOUNTER — ANESTHESIA (OUTPATIENT)
Dept: ENDOSCOPY | Age: 70
End: 2020-01-30
Payer: MEDICARE

## 2020-01-30 VITALS
WEIGHT: 219 LBS | OXYGEN SATURATION: 100 % | SYSTOLIC BLOOD PRESSURE: 150 MMHG | BODY MASS INDEX: 43 KG/M2 | RESPIRATION RATE: 16 BRPM | HEART RATE: 65 BPM | HEIGHT: 60 IN | TEMPERATURE: 98.9 F | DIASTOLIC BLOOD PRESSURE: 77 MMHG

## 2020-01-30 VITALS — SYSTOLIC BLOOD PRESSURE: 121 MMHG | DIASTOLIC BLOOD PRESSURE: 106 MMHG | OXYGEN SATURATION: 100 %

## 2020-01-30 LAB
GLUCOSE BLD-MCNC: 117 MG/DL (ref 70–99)
GLUCOSE BLD-MCNC: 140 MG/DL (ref 70–99)
PERFORMED ON: ABNORMAL
PERFORMED ON: ABNORMAL

## 2020-01-30 PROCEDURE — 7100000011 HC PHASE II RECOVERY - ADDTL 15 MIN: Performed by: INTERNAL MEDICINE

## 2020-01-30 PROCEDURE — 2580000003 HC RX 258: Performed by: NURSE ANESTHETIST, CERTIFIED REGISTERED

## 2020-01-30 PROCEDURE — 2500000003 HC RX 250 WO HCPCS: Performed by: NURSE ANESTHETIST, CERTIFIED REGISTERED

## 2020-01-30 PROCEDURE — 2500000003 HC RX 250 WO HCPCS: Performed by: INTERNAL MEDICINE

## 2020-01-30 PROCEDURE — 6360000002 HC RX W HCPCS: Performed by: NURSE ANESTHETIST, CERTIFIED REGISTERED

## 2020-01-30 PROCEDURE — 3700000001 HC ADD 15 MINUTES (ANESTHESIA): Performed by: INTERNAL MEDICINE

## 2020-01-30 PROCEDURE — 2709999900 HC NON-CHARGEABLE SUPPLY: Performed by: INTERNAL MEDICINE

## 2020-01-30 PROCEDURE — 6370000000 HC RX 637 (ALT 250 FOR IP): Performed by: INTERNAL MEDICINE

## 2020-01-30 PROCEDURE — 7100000010 HC PHASE II RECOVERY - FIRST 15 MIN: Performed by: INTERNAL MEDICINE

## 2020-01-30 PROCEDURE — 3700000000 HC ANESTHESIA ATTENDED CARE: Performed by: INTERNAL MEDICINE

## 2020-01-30 PROCEDURE — 3609010600 HC COLONOSCOPY POLYPECTOMY SNARE/COLD BIOPSY: Performed by: INTERNAL MEDICINE

## 2020-01-30 PROCEDURE — 88305 TISSUE EXAM BY PATHOLOGIST: CPT

## 2020-01-30 PROCEDURE — 3609019800 HC COLONOSCOPY WITH SUBMUCOSAL INJECTION: Performed by: INTERNAL MEDICINE

## 2020-01-30 RX ORDER — ONDANSETRON 2 MG/ML
4 INJECTION INTRAMUSCULAR; INTRAVENOUS
Status: DISCONTINUED | OUTPATIENT
Start: 2020-01-30 | End: 2020-01-30 | Stop reason: HOSPADM

## 2020-01-30 RX ORDER — SODIUM CHLORIDE 0.9 % (FLUSH) 0.9 %
10 SYRINGE (ML) INJECTION PRN
Status: DISCONTINUED | OUTPATIENT
Start: 2020-01-30 | End: 2020-01-30 | Stop reason: HOSPADM

## 2020-01-30 RX ORDER — SODIUM CHLORIDE 9 MG/ML
INJECTION, SOLUTION INTRAVENOUS CONTINUOUS PRN
Status: DISCONTINUED | OUTPATIENT
Start: 2020-01-30 | End: 2020-01-30 | Stop reason: SDUPTHER

## 2020-01-30 RX ORDER — SODIUM CHLORIDE 0.9 % (FLUSH) 0.9 %
10 SYRINGE (ML) INJECTION EVERY 12 HOURS SCHEDULED
Status: DISCONTINUED | OUTPATIENT
Start: 2020-01-30 | End: 2020-01-30 | Stop reason: HOSPADM

## 2020-01-30 RX ORDER — PROPOFOL 10 MG/ML
INJECTION, EMULSION INTRAVENOUS PRN
Status: DISCONTINUED | OUTPATIENT
Start: 2020-01-30 | End: 2020-01-30 | Stop reason: SDUPTHER

## 2020-01-30 RX ORDER — PROMETHAZINE HYDROCHLORIDE 25 MG/ML
6.25 INJECTION, SOLUTION INTRAMUSCULAR; INTRAVENOUS
Status: DISCONTINUED | OUTPATIENT
Start: 2020-01-30 | End: 2020-01-30 | Stop reason: HOSPADM

## 2020-01-30 RX ORDER — LIDOCAINE HYDROCHLORIDE 20 MG/ML
INJECTION, SOLUTION EPIDURAL; INFILTRATION; INTRACAUDAL; PERINEURAL PRN
Status: DISCONTINUED | OUTPATIENT
Start: 2020-01-30 | End: 2020-01-30 | Stop reason: SDUPTHER

## 2020-01-30 RX ORDER — SODIUM CHLORIDE 9 MG/ML
INJECTION, SOLUTION INTRAVENOUS CONTINUOUS
Status: DISCONTINUED | OUTPATIENT
Start: 2020-01-30 | End: 2020-01-30 | Stop reason: HOSPADM

## 2020-01-30 RX ORDER — LABETALOL HYDROCHLORIDE 5 MG/ML
5 INJECTION, SOLUTION INTRAVENOUS EVERY 10 MIN PRN
Status: DISCONTINUED | OUTPATIENT
Start: 2020-01-30 | End: 2020-01-30 | Stop reason: HOSPADM

## 2020-01-30 RX ADMIN — PROPOFOL 40 MG: 10 INJECTION, EMULSION INTRAVENOUS at 10:58

## 2020-01-30 RX ADMIN — PROPOFOL 40 MG: 10 INJECTION, EMULSION INTRAVENOUS at 11:20

## 2020-01-30 RX ADMIN — PROPOFOL 40 MG: 10 INJECTION, EMULSION INTRAVENOUS at 10:54

## 2020-01-30 RX ADMIN — PROPOFOL 20 MG: 10 INJECTION, EMULSION INTRAVENOUS at 11:07

## 2020-01-30 RX ADMIN — LIDOCAINE HYDROCHLORIDE 60 MG: 20 INJECTION, SOLUTION EPIDURAL; INFILTRATION; INTRACAUDAL; PERINEURAL at 10:44

## 2020-01-30 RX ADMIN — LIDOCAINE HYDROCHLORIDE 20 MG: 20 INJECTION, SOLUTION EPIDURAL; INFILTRATION; INTRACAUDAL; PERINEURAL at 10:50

## 2020-01-30 RX ADMIN — PROPOFOL 30 MG: 10 INJECTION, EMULSION INTRAVENOUS at 11:02

## 2020-01-30 RX ADMIN — LIDOCAINE HYDROCHLORIDE 20 MG: 20 INJECTION, SOLUTION EPIDURAL; INFILTRATION; INTRACAUDAL; PERINEURAL at 10:54

## 2020-01-30 RX ADMIN — PROPOFOL 40 MG: 10 INJECTION, EMULSION INTRAVENOUS at 10:50

## 2020-01-30 RX ADMIN — SODIUM CHLORIDE: 9 INJECTION, SOLUTION INTRAVENOUS at 10:39

## 2020-01-30 RX ADMIN — PROPOFOL 120 MG: 10 INJECTION, EMULSION INTRAVENOUS at 10:44

## 2020-01-30 RX ADMIN — PROPOFOL 40 MG: 10 INJECTION, EMULSION INTRAVENOUS at 11:13

## 2020-01-30 RX ADMIN — PROPOFOL 30 MG: 10 INJECTION, EMULSION INTRAVENOUS at 11:25

## 2020-01-30 RX ADMIN — LIDOCAINE HYDROCHLORIDE 20 MG: 20 INJECTION, SOLUTION EPIDURAL; INFILTRATION; INTRACAUDAL; PERINEURAL at 10:58

## 2020-01-30 ASSESSMENT — PAIN - FUNCTIONAL ASSESSMENT: PAIN_FUNCTIONAL_ASSESSMENT: 0-10

## 2020-01-30 ASSESSMENT — PAIN SCALES - GENERAL
PAINLEVEL_OUTOF10: 0

## 2020-01-30 NOTE — PROGRESS NOTES
Responded to referral made by RN, Michaela Phillips, at request of pt for information about Advance Directives. Pt in procedure. Visited w/pt's daughter, answered questions about purpose of Advance Directives, and provided documents for pt review. Contact information for  assistance given. Spiritual support provided for pt's daughter through active listening, pastoral presence, and blessing.

## 2020-01-30 NOTE — ANESTHESIA PRE PROCEDURE
Darin Beltrán                         BMI: Wt Readings from Last 3 Encounters:       NPO Status: 8 hours                          Anesthesia Evaluation  Patient summary reviewed no history of anesthetic complications:   Airway: Mallampati: III  TM distance: >3 FB   Neck ROM: full   Dental:    (+) partials      Pulmonary:normal exam    (+) asthma:                            Cardiovascular:  Exercise tolerance: good (>4 METS),   (+) hypertension:,       ECG reviewed  Rhythm: regular  Rate: normal           Beta Blocker:  Dose within 24 Hrs         Neuro/Psych:   Negative Neuro/Psych ROS              GI/Hepatic/Renal:   (+) GERD:, bowel prep, morbid obesity          Endo/Other:    (+) DiabetesType II DM, using insulin, . Abdominal:   (+) obese,         Vascular: negative vascular ROS. Anesthesia Plan      MAC     ASA 3       Induction: intravenous. Anesthetic plan and risks discussed with patient and child/children. Plan discussed with CRNA. This pre-anesthesia assessment may be used as a history and physical.    DOS STAFF ADDENDUM:    Pt seen and examined, chart reviewed (including anesthesia, drug and allergy history). No interval changes to history and physical examination. Anesthetic plan, risks, benefits, alternatives, and personnel involved discussed with patient. Questions and concerns addressed. Patient(family) verbalized an understanding and agrees to proceed.       Archie Tellez MD  January 30, 2020  10:02 AM

## 2020-01-30 NOTE — H&P
[facility] [place]  GI Consult Note    Patient: Mehnaz Clayton  : 1950  Acct#:      Date:  2020    Subjective:       History of Present Illness  Patient is a 68 y.o.  female for colonscopy. Past Medical History:   Diagnosis Date    Allergic rhinitis     Asthma     Back pain     GI bleeding 2014    Glaucoma     both eyes    Hypertension     Knee pain     Morbid obesity due to excess calories ([facility] [address] 2015    Osteoporosis     Type II or unspecified type diabetes mellitus without mention of complication, not stated as uncontrolled       Past Surgical History:   Procedure Laterality Date    COLONOSCOPY  2014    polyp removed    HYSTERECTOMY      KNEE SURGERY Left     knee scoped and \"cleaned out\"        Admission Meds  No current facility-administered medications on file prior to encounter.       Current Outpatient Medications on File Prior to Encounter   Medication Sig Dispense Refill    sertraline (ZOLOFT) 50 MG tablet TAKE 2 TABLETS (100MG) BY MOUTH 2 TIMES A DAY (MOOD) 120 tablet 5    loratadine (ALLERGY RELIEF) 10 MG tablet TAKE 1 CAP (10MG) BY MOUTH EVERY DAY (ALLERGIES) 30 tablet 0    montelukast (SINGULAIR) 10 MG tablet Take 1 tablet by mouth daily 30 tablet 10    fluticasone (FLONASE) 50 MCG/ACT nasal spray 1 spray by Nasal route daily 1 Bottle 5    Insulin Degludec (TRESIBA) 100 UNIT/ML SOLN Inject 25 units under skin daily 1 vial 10    metoprolol tartrate (LOPRESSOR) 25 MG tablet TAKE 1 TABLET (25MG) BY MOUTH 2 TIMES A DAY (BLOOD PRESSURE) 60 tablet 5    acetaminophen (TYLENOL) 500 MG tablet Take 1 tablet by mouth 4 times daily as needed for Pain 120 tablet 1    albuterol (PROVENTIL) (2.5 MG/3ML) 0.083% nebulizer solution INHALE CONTENTS OF 1 VIAL (3ML) BY MOUTH VIA NEBULIZER 3 TIMES A  mL 0    hydrochlorothiazide (HYDRODIURIL) 25 MG tablet TAKE 1 TABLET (25MG) BY MOUTH EVERY DAY (DIURECTIC/ BLOOD PRESSURE) 30 tablet 5    amLODIPine (NORVASC) 10 MG tablet TAKE 1 TABLET (10MG) BY MOUTH EVERY DAY (BLOOD PRESSURE) 30 tablet 5    budesonide-formoterol (SYMBICORT) 160-4.5 MCG/ACT AERO Inhale 2 puffs into the lungs 2 times daily 30 g 3    tiotropium (SPIRIVA RESPIMAT) 1.25 MCG/ACT AERS inhaler Inhale 2 puffs into the lungs daily 4 g 5    VENTOLIN  (90 Base) MCG/ACT inhaler INHALE 2 PUFFS BY MOUTH EVERY 6 HOURS AS NEEDED 1 Inhaler 5    beclomethasone (QVAR) 80 MCG/ACT inhaler Inhale 2 puffs into the lungs 2 times daily 1 Inhaler 11    albuterol sulfate HFA (VENTOLIN HFA) 108 (90 Base) MCG/ACT inhaler Inhale 2 puffs into the lungs every 6 hours as needed for Wheezing 1 Inhaler 3    Multiple Vitamins-Minerals (MULTIVITAMIN WITH MINERALS) tablet Take 1 tablet by mouth daily 30 tablet 3    dorzolamide (TRUSOPT) 2 % ophthalmic solution   6    Bimatoprost (LUMIGAN) 0.01 % SOLN Apply  to eye. One drop in each eye at bedtime       brimonidine (ALPHAGAN P) 0.1 % SOLN 1 drop 2 times daily.  One drop in both eyes every 12 hours      Blood Glucose Monitoring Suppl (TRUE METRIX METER) w/Device KIT Test glucose TID 1 kit 1    Exenatide (BYDUREON) 2 MG PEN Inject 1 pen into the skin every 7 days 4 pen 5    B-D ULTRAFINE III SHORT PEN 31G X 8 MM MISC USE TO INJECT INSULIN (QID) 129 each 5    insulin glargine (BASAGLAR KWIKPEN) 100 UNIT/ML injection pen INJECT 25 UNITS UNDER SKIN EVERY DAY AT BEDTIME (Patient not taking: Reported on 1/10/2020) 9 mL 5    tiZANidine (ZANAFLEX) 2 MG tablet One or two tabs prn bedtime 30 tablet 0    Alcohol Swabs PADS Use TID to test blood glucose 200 each 5    ACCU-CHEK SOFTCLIX LANCETS MISC USE TO TEST BLOOD SUGAR 3 TIMES A  each 5    Blood Glucose Monitoring Suppl GRETCHEN Dispense one true track meter   Test glucose twice a day 1 Device 0         Allergies  Allergies   Allergen Reactions    Pantoprazole Sodium Hives    Enalapril Swelling     angioedema    Lisinopril Swelling     Lip swelling/angioedema    :    1.gi: plan colonoscopy          Chang Marvin MD  [facility] and [address] [facility]

## 2020-02-18 ENCOUNTER — HOSPITAL ENCOUNTER (OUTPATIENT)
Dept: CT IMAGING | Age: 70
Discharge: HOME | End: 2020-02-18
Payer: MEDICARE

## 2020-02-18 ENCOUNTER — HOSPITAL ENCOUNTER (OUTPATIENT)
Age: 70
Discharge: HOME | End: 2020-02-18
Payer: MEDICARE

## 2020-02-18 LAB
A/G RATIO: 1.2 (ref 1.1–2.2)
ALBUMIN SERPL-MCNC: 4.3 G/DL (ref 3.4–5)
ALP BLD-CCNC: 87 U/L (ref 40–129)
ALT SERPL-CCNC: 9 U/L (ref 10–40)
ANION GAP SERPL CALCULATED.3IONS-SCNC: 13 MMOL/L (ref 3–16)
AST SERPL-CCNC: 16 U/L (ref 15–37)
BILIRUB SERPL-MCNC: 0.4 MG/DL (ref 0–1)
BUN BLDV-MCNC: 14 MG/DL (ref 7–20)
CALCIUM SERPL-MCNC: 9.2 MG/DL (ref 8.3–10.6)
CHLORIDE BLD-SCNC: 95 MMOL/L (ref 99–110)
CO2: 30 MMOL/L (ref 21–32)
CREAT SERPL-MCNC: 0.9 MG/DL (ref 0.6–1.2)
GFR AFRICAN AMERICAN: >60
GFR NON-AFRICAN AMERICAN: >60
GLOBULIN: 3.6 G/DL
GLUCOSE BLD-MCNC: 94 MG/DL (ref 70–99)
POTASSIUM SERPL-SCNC: 4 MMOL/L (ref 3.5–5.1)
SODIUM BLD-SCNC: 138 MMOL/L (ref 136–145)
TOTAL PROTEIN: 7.9 G/DL (ref 6.4–8.2)

## 2020-02-18 PROCEDURE — 6360000004 HC RX CONTRAST MEDICATION: Performed by: INTERNAL MEDICINE

## 2020-02-18 PROCEDURE — 74160 CT ABDOMEN W/CONTRAST: CPT

## 2020-02-18 PROCEDURE — 80053 COMPREHEN METABOLIC PANEL: CPT

## 2020-02-18 PROCEDURE — 36415 COLL VENOUS BLD VENIPUNCTURE: CPT

## 2020-02-18 PROCEDURE — 71260 CT THORAX DX C+: CPT

## 2020-02-18 RX ADMIN — IOPAMIDOL 75 ML: 755 INJECTION, SOLUTION INTRAVENOUS at 14:28

## 2020-02-18 RX ADMIN — IOHEXOL 50 ML: 240 INJECTION, SOLUTION INTRATHECAL; INTRAVASCULAR; INTRAVENOUS; ORAL at 13:00

## 2020-02-26 ENCOUNTER — PREP FOR PROCEDURE (OUTPATIENT)
Dept: SURGERY | Age: 70
End: 2020-02-26

## 2020-02-26 ENCOUNTER — OFFICE VISIT (OUTPATIENT)
Dept: SURGERY | Age: 70
End: 2020-02-26
Payer: COMMERCIAL

## 2020-02-26 VITALS
WEIGHT: 220 LBS | DIASTOLIC BLOOD PRESSURE: 65 MMHG | HEART RATE: 91 BPM | SYSTOLIC BLOOD PRESSURE: 125 MMHG | HEIGHT: 60 IN | BODY MASS INDEX: 43.19 KG/M2 | OXYGEN SATURATION: 93 %

## 2020-02-26 PROCEDURE — 99205 OFFICE O/P NEW HI 60 MIN: CPT | Performed by: SURGERY

## 2020-02-26 RX ORDER — CIPROFLOXACIN 2 MG/ML
400 INJECTION, SOLUTION INTRAVENOUS
Status: CANCELLED | OUTPATIENT
Start: 2020-02-26 | End: 2020-02-26

## 2020-02-26 RX ORDER — SODIUM CHLORIDE 0.9 % (FLUSH) 0.9 %
10 SYRINGE (ML) INJECTION PRN
Status: CANCELLED | OUTPATIENT
Start: 2020-02-26

## 2020-02-26 RX ORDER — SODIUM CHLORIDE 0.9 % (FLUSH) 0.9 %
10 SYRINGE (ML) INJECTION EVERY 12 HOURS SCHEDULED
Status: CANCELLED | OUTPATIENT
Start: 2020-02-26

## 2020-02-26 RX ORDER — METRONIDAZOLE 500 MG/1
TABLET ORAL
Qty: 3 TABLET | Refills: 0 | Status: ON HOLD | OUTPATIENT
Start: 2020-02-26 | End: 2021-03-01 | Stop reason: HOSPADM

## 2020-02-26 RX ORDER — NEOMYCIN SULFATE 500 MG/1
TABLET ORAL
Qty: 6 TABLET | Refills: 0 | Status: ON HOLD | OUTPATIENT
Start: 2020-02-26 | End: 2021-03-01 | Stop reason: HOSPADM

## 2020-02-26 NOTE — LETTER
[facility] [address] [place] [address] [phone]  f [phone]    Yulisa Tinsley MD                        SURGERY ORDER   -- Time of order -- 20    5:25 PM    Facility:   [facility].  # _________________                                                                                    Scheduled By:____________                  Surgery Date & Time:  3/30/20 @ 10:30 am                                     Pt arrival: 8:30 am                                                                                      Patient Name:  Tess An     :  1950     PCP:  Maude Marley MD      Home Ph:    [phone] (home)                                                     PROCEDURE:  Robotic vs laparoscopic left colectomy, possible open, possible ostomy  [id number]  DIAGNOSIS:  Colon cancer descending colon - 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.6    Anesthesia: _General    Anesthesia (lines, blocks, TAP/epidural, etc): none  Time Needed:  3 hours    Pt Position:  lithotomy    Ureteral Stents: no  Ostomy Marking: no          Admit _x__                  Pre-Op clearance to be done by: _PCP___(Done 3/9)_    Cardiac Clearance Done by: ________    Medications to be stopped 5 days before surgery: _________                                                                                                                                                                                                         Yulisa Tinsley MD                          COLON SURGERY CHECKLIST    -- Pre-op clearance: PCP  -- Surgery order faxed, date/time obtained, placed on calendar  -- Prep and oral antibiotics (#2) ordered, information given to patient  -- Phone call day before procedure to confirm  -- Post op appt: 2 weeks  -- Other:

## 2020-02-26 NOTE — PATIENT INSTRUCTIONS
converted to an open surgery at the same time as the laparoscopic operation. Risk factors for requiring conversion to an open surgery include scar tissue from previous abdominal surgeries, large hernias, bleeding, or large tumors. Both techniques require general anesthesia and approximately 2 - 5 days on average spent recovering in the hospital. Several large studies have demonstrated that both approaches produce equivalent cancer outcomes when performed by surgeons with sufficient training in colorectal surgery. The surgeon removes the colon and the same amount of normal colon and lymph nodes with either approach. There are specific indications for choosing laparoscopy, robotic, or open surgery; your surgeon will discuss these features with you prior to the operation. When a section of your colon or rectum is removed, the surgeon can usually reconnect the healthy ends of your intestine. This is called an anastomosis. However, sometimes reconnection is not possible. In this case, the surgeon creates a new path for waste to leave your body. The surgeon makes an opening (stoma) in the wall of the abdomen, connects the intestine to the skin, and closes the other end. The operation to create the stoma is called a colostomy. A flat bag fits over the stoma to collect waste, and a special adhesive holds it in place. Less commonly, the small intestine may be used to create a stoma (an ileostomy). For many people, a temporary stoma can be reversed 2-6 months later, depending on the situation. BEFORE SURGERY    Be sure to discuss all of your medications with your surgeon. If you take any blood thinners, a plan will need to be in place for stopping these at a certain time before surgery. This will often be coordinated with your primary care provider or another specialist, such as a cardiologist, if needed.   Be sure to be as active as possible in the weeks before surgery, and to maintain a healthy lifestyle and

## 2020-03-03 ENCOUNTER — TELEPHONE (OUTPATIENT)
Dept: SURGERY | Age: 70
End: 2020-03-03

## 2020-03-09 ENCOUNTER — OFFICE VISIT (OUTPATIENT)
Dept: PRIMARY CARE CLINIC | Age: 70
End: 2020-03-09
Payer: COMMERCIAL

## 2020-03-09 VITALS
BODY MASS INDEX: 42.53 KG/M2 | HEIGHT: 60 IN | WEIGHT: 216.6 LBS | SYSTOLIC BLOOD PRESSURE: 133 MMHG | DIASTOLIC BLOOD PRESSURE: 84 MMHG | HEART RATE: 86 BPM | OXYGEN SATURATION: 99 %

## 2020-03-09 LAB
BACTERIA: ABNORMAL /HPF
BILIRUBIN URINE: NEGATIVE
BLOOD, URINE: NEGATIVE
CLARITY: ABNORMAL
COLOR: YELLOW
CREATININE URINE: 113.8 MG/DL (ref 28–259)
EPITHELIAL CELLS, UA: 7 /HPF (ref 0–5)
GLUCOSE URINE: NEGATIVE MG/DL
HCT VFR BLD CALC: 38.5 % (ref 36–48)
HEMOGLOBIN: 12.5 G/DL (ref 12–16)
KETONES, URINE: NEGATIVE MG/DL
LEUKOCYTE ESTERASE, URINE: ABNORMAL
MCH RBC QN AUTO: 27.4 PG (ref 26–34)
MCHC RBC AUTO-ENTMCNC: 32.4 G/DL (ref 31–36)
MCV RBC AUTO: 84.6 FL (ref 80–100)
MICROALBUMIN UR-MCNC: 3.5 MG/DL
MICROALBUMIN/CREAT UR-RTO: 30.8 MG/G (ref 0–30)
MICROSCOPIC EXAMINATION: YES
NITRITE, URINE: POSITIVE
PDW BLD-RTO: 15.5 % (ref 12.4–15.4)
PH UA: 6 (ref 5–8)
PLATELET # BLD: 352 K/UL (ref 135–450)
PMV BLD AUTO: 8.5 FL (ref 5–10.5)
PROTEIN UA: NEGATIVE MG/DL
RBC # BLD: 4.55 M/UL (ref 4–5.2)
RBC UA: ABNORMAL /HPF (ref 0–4)
SPECIFIC GRAVITY UA: 1.02 (ref 1–1.03)
URINE TYPE: ABNORMAL
UROBILINOGEN, URINE: 0.2 E.U./DL
WBC # BLD: 9.6 K/UL (ref 4–11)
WBC UA: 12 /HPF (ref 0–5)

## 2020-03-09 PROCEDURE — 93000 ELECTROCARDIOGRAM COMPLETE: CPT | Performed by: FAMILY MEDICINE

## 2020-03-09 PROCEDURE — 99214 OFFICE O/P EST MOD 30 MIN: CPT | Performed by: FAMILY MEDICINE

## 2020-03-09 ASSESSMENT — ENCOUNTER SYMPTOMS
COLOR CHANGE: 0
CHEST TIGHTNESS: 0
APNEA: 0
EYE REDNESS: 0
CHOKING: 0
STRIDOR: 0
BACK PAIN: 0
ABDOMINAL DISTENTION: 0
EYE ITCHING: 0
VOMITING: 0
SINUS PRESSURE: 0
SORE THROAT: 0
EYE PAIN: 0
COUGH: 0
PHOTOPHOBIA: 0
EYE DISCHARGE: 0
DIARRHEA: 0
BLOOD IN STOOL: 0
NAUSEA: 0
RECTAL PAIN: 0
WHEEZING: 0
RHINORRHEA: 0
SHORTNESS OF BREATH: 0
CONSTIPATION: 0
TROUBLE SWALLOWING: 0

## 2020-03-09 NOTE — PROGRESS NOTES
250-50 MCG/DOSE AEPB Inhale 1 puff into the lungs every 12 hours 60 each 3    sertraline (ZOLOFT) 50 MG tablet TAKE 2 TABLETS (100MG) BY MOUTH 2 TIMES A DAY (MOOD) 120 tablet 5    montelukast (SINGULAIR) 10 MG tablet Take 1 tablet by mouth daily 30 tablet 10    Blood Glucose Monitoring Suppl (TRUE METRIX METER) w/Device KIT Test glucose TID 1 kit 1    Exenatide (BYDUREON) 2 MG PEN Inject 1 pen into the skin every 7 days 4 pen 5    fluticasone (FLONASE) 50 MCG/ACT nasal spray 1 spray by Nasal route daily 1 Bottle 5    Insulin Degludec (TRESIBA) 100 UNIT/ML SOLN Inject 25 units under skin daily 1 vial 10    metoprolol tartrate (LOPRESSOR) 25 MG tablet TAKE 1 TABLET (25MG) BY MOUTH 2 TIMES A DAY (BLOOD PRESSURE) 60 tablet 5    acetaminophen (TYLENOL) 500 MG tablet Take 1 tablet by mouth 4 times daily as needed for Pain 120 tablet 1    albuterol (PROVENTIL) (2.5 MG/3ML) 0.083% nebulizer solution INHALE CONTENTS OF 1 VIAL (3ML) BY MOUTH VIA NEBULIZER 3 TIMES A  mL 0    hydrochlorothiazide (HYDRODIURIL) 25 MG tablet TAKE 1 TABLET (25MG) BY MOUTH EVERY DAY (DIURECTIC/ BLOOD PRESSURE) 30 tablet 5    amLODIPine (NORVASC) 10 MG tablet TAKE 1 TABLET (10MG) BY MOUTH EVERY DAY (BLOOD PRESSURE) 30 tablet 5    B-D ULTRAFINE III SHORT PEN 31G X 8 MM MISC USE TO INJECT INSULIN (QID) 129 each 5    insulin glargine (BASAGLAR KWIKPEN) 100 UNIT/ML injection pen INJECT 25 UNITS UNDER SKIN EVERY DAY AT BEDTIME 9 mL 5    tiZANidine (ZANAFLEX) 2 MG tablet One or two tabs prn bedtime 30 tablet 0    budesonide-formoterol (SYMBICORT) 160-4.5 MCG/ACT AERO Inhale 2 puffs into the lungs 2 times daily 30 g 3    tiotropium (SPIRIVA RESPIMAT) 1.25 MCG/ACT AERS inhaler Inhale 2 puffs into the lungs daily 4 g 5    Alcohol Swabs PADS Use TID to test blood glucose 200 each 5    VENTOLIN  (90 Base) MCG/ACT inhaler INHALE 2 PUFFS BY MOUTH EVERY 6 HOURS AS NEEDED 1 Inhaler 5    ACCU-CHEK SOFTCLIX LANCETS MISC USE TO TEST BLOOD SUGAR 3 TIMES A  each 5    beclomethasone (QVAR) 80 MCG/ACT inhaler Inhale 2 puffs into the lungs 2 times daily 1 Inhaler 11    albuterol sulfate HFA (VENTOLIN HFA) 108 (90 Base) MCG/ACT inhaler Inhale 2 puffs into the lungs every 6 hours as needed for Wheezing 1 Inhaler 3    Blood Glucose Monitoring Suppl GRETCHEN Dispense one true track meter   Test glucose twice a day 1 Device 0    Multiple Vitamins-Minerals (MULTIVITAMIN WITH MINERALS) tablet Take 1 tablet by mouth daily 30 tablet 3    dorzolamide (TRUSOPT) 2 % ophthalmic solution   6    Bimatoprost (LUMIGAN) 0.01 % SOLN Apply  to eye. One drop in each eye at bedtime       brimonidine (ALPHAGAN P) 0.1 % SOLN 1 drop 2 times daily. One drop in both eyes every 12 hours       No current facility-administered medications for this visit. Social History     Socioeconomic History    Marital status:       Spouse name: Not on file    Number of children: Not on file    Years of education: Not on file    Highest education level: Not on file   Occupational History    Not on file   Social Needs    Financial resource strain: Not on file    Food insecurity     Worry: Not on file     Inability: Not on file    Transportation needs     Medical: Not on file     Non-medical: Not on file   Tobacco Use    Smoking status: Former Smoker     Packs/day: 0.50     Years: 20.00     Pack years: 10.00     Types: Cigarettes     Start date: 1984     Last attempt to quit: 1993     Years since quittin.5    Smokeless tobacco: Never Used   Substance and Sexual Activity    Alcohol use: No    Drug use: No    Sexual activity: Not on file   Lifestyle    Physical activity     Days per week: Not on file     Minutes per session: Not on file    Stress: Not on file   Relationships    Social connections     Talks on phone: Not on file     Gets together: Not on file     Attends Buddhist service: Not on file     Active member of club or organization: Attends meetings of clubs or organizations: Not on file     Relationship status: Not on file    Intimate partner violence     Fear of current or ex partner: Not on file     Emotionally abused: Not on file     Physically abused: Not on file     Forced sexual activity: Not on file   Other Topics Concern    Not on file   Social History Narrative    Not on file     Allergies   Allergen Reactions    Pantoprazole Sodium Hives    Enalapril Swelling     angioedema    Lisinopril Swelling     Lip swelling/angioedema    Milk-Related Compounds Other (See Comments)     Caused GI bleeding (cow's milk)    Pcn [Penicillins] Hives    Tape [Adhesive Tape] Other (See Comments)     Paper tape causes redness, irritation     Family History   Problem Relation Age of Onset    Diabetes Mother     Cancer Father          Review of Systems   Constitutional: Negative for activity change, appetite change, chills, diaphoresis, fatigue and fever. HENT: Negative for congestion, dental problem, drooling, ear discharge, ear pain, hearing loss, mouth sores, nosebleeds, postnasal drip, rhinorrhea, sinus pressure, sneezing, sore throat, tinnitus and trouble swallowing. Eyes: Negative for photophobia, pain, discharge, redness, itching and visual disturbance. Respiratory: Negative for apnea, cough, choking, chest tightness, shortness of breath, wheezing and stridor. Cardiovascular: Negative for chest pain, palpitations and leg swelling. Gastrointestinal: Negative for abdominal distention, blood in stool, constipation, diarrhea, nausea, rectal pain and vomiting. Genitourinary: Negative for decreased urine volume, difficulty urinating, dyspareunia, dysuria, enuresis, flank pain, frequency, genital sores, hematuria, menstrual problem, pelvic pain, urgency, vaginal bleeding and vaginal pain. Musculoskeletal: Negative for arthralgias, back pain, gait problem, joint swelling, myalgias, neck pain and neck stiffness.    Skin:

## 2020-03-10 LAB
A/G RATIO: 1.4 (ref 1.1–2.2)
ALBUMIN SERPL-MCNC: 4.6 G/DL (ref 3.4–5)
ALP BLD-CCNC: 86 U/L (ref 40–129)
ALT SERPL-CCNC: 7 U/L (ref 10–40)
ANION GAP SERPL CALCULATED.3IONS-SCNC: 18 MMOL/L (ref 3–16)
AST SERPL-CCNC: 13 U/L (ref 15–37)
BILIRUB SERPL-MCNC: 0.5 MG/DL (ref 0–1)
BUN BLDV-MCNC: 18 MG/DL (ref 7–20)
CALCIUM SERPL-MCNC: 9.9 MG/DL (ref 8.3–10.6)
CHLORIDE BLD-SCNC: 98 MMOL/L (ref 99–110)
CO2: 28 MMOL/L (ref 21–32)
CREAT SERPL-MCNC: 1 MG/DL (ref 0.6–1.2)
ESTIMATED AVERAGE GLUCOSE: 125.5 MG/DL
GFR AFRICAN AMERICAN: >60
GFR NON-AFRICAN AMERICAN: 55
GLOBULIN: 3.2 G/DL
GLUCOSE BLD-MCNC: 63 MG/DL (ref 70–99)
HBA1C MFR BLD: 6 %
POTASSIUM SERPL-SCNC: 3.5 MMOL/L (ref 3.5–5.1)
SODIUM BLD-SCNC: 144 MMOL/L (ref 136–145)
TOTAL PROTEIN: 7.8 G/DL (ref 6.4–8.2)

## 2020-03-10 RX ORDER — SULFAMETHOXAZOLE AND TRIMETHOPRIM 800; 160 MG/1; MG/1
1 TABLET ORAL 2 TIMES DAILY
Qty: 6 TABLET | Refills: 0 | Status: SHIPPED | OUTPATIENT
Start: 2020-03-10 | End: 2020-03-13

## 2020-03-13 ENCOUNTER — TELEPHONE (OUTPATIENT)
Dept: PRIMARY CARE CLINIC | Age: 70
End: 2020-03-13

## 2020-03-13 RX ORDER — NEOMYCIN SULFATE 500 MG/1
TABLET ORAL
Qty: 6 TABLET | Refills: 0 | Status: ON HOLD
Start: 2020-03-13 | End: 2021-03-01 | Stop reason: HOSPADM

## 2020-03-13 RX ORDER — METRONIDAZOLE 500 MG/1
TABLET ORAL
Qty: 3 TABLET | Refills: 0 | Status: ON HOLD
Start: 2020-03-13 | End: 2021-03-01 | Stop reason: HOSPADM

## 2020-03-13 NOTE — TELEPHONE ENCOUNTER
I finally reached her daughter today and they thought the surgery was today but she was on the calendar for 3/27. We scheduled her for 3/30. Please sign for antibiotics since she took them yesterday.

## 2020-03-17 ENCOUNTER — TELEPHONE (OUTPATIENT)
Dept: ADMINISTRATIVE | Age: 70
End: 2020-03-17

## 2020-03-17 ENCOUNTER — TELEPHONE (OUTPATIENT)
Dept: PRIMARY CARE CLINIC | Age: 70
End: 2020-03-17

## 2020-03-17 RX ORDER — METHYLPREDNISOLONE 4 MG/1
TABLET ORAL
Qty: 21 TABLET | Refills: 0 | Status: SHIPPED | OUTPATIENT
Start: 2020-03-17 | End: 2020-03-20 | Stop reason: SDUPTHER

## 2020-03-17 NOTE — TELEPHONE ENCOUNTER
Spoke with patient  She is still having some  Itching  Asked her to increase benadryl 25 mg to 3 times a day prn    Medrol dose claude sent    Discussed steroids can cause glucose to rise  And she will monitor her diabetes/glucose

## 2020-03-18 LAB
ORGANISM: ABNORMAL
URINE CULTURE, ROUTINE: ABNORMAL
URINE CULTURE, ROUTINE: ABNORMAL

## 2020-03-18 RX ORDER — NITROFURANTOIN 25; 75 MG/1; MG/1
100 CAPSULE ORAL 2 TIMES DAILY
Qty: 20 CAPSULE | Refills: 0 | Status: SHIPPED | OUTPATIENT
Start: 2020-03-18 | End: 2020-03-20 | Stop reason: SDUPTHER

## 2020-03-19 ENCOUNTER — TELEPHONE (OUTPATIENT)
Dept: SURGERY | Age: 70
End: 2020-03-19

## 2020-03-20 ENCOUNTER — TELEPHONE (OUTPATIENT)
Dept: PRIMARY CARE CLINIC | Age: 70
End: 2020-03-20

## 2020-03-20 RX ORDER — METHYLPREDNISOLONE 4 MG/1
TABLET ORAL
Qty: 21 TABLET | Refills: 0 | Status: SHIPPED | OUTPATIENT
Start: 2020-03-20 | End: 2021-02-22

## 2020-03-20 RX ORDER — NITROFURANTOIN 25; 75 MG/1; MG/1
100 CAPSULE ORAL 2 TIMES DAILY
Qty: 20 CAPSULE | Refills: 0 | Status: SHIPPED | OUTPATIENT
Start: 2020-03-20 | End: 2020-03-30

## 2020-03-30 ENCOUNTER — HOSPITAL ENCOUNTER (EMERGENCY)
Age: 70
Discharge: HOME | End: 2020-03-31
Attending: EMERGENCY MEDICINE
Payer: MEDICARE

## 2020-03-30 ENCOUNTER — APPOINTMENT (OUTPATIENT)
Dept: CT IMAGING | Age: 70
End: 2020-03-30
Payer: MEDICARE

## 2020-03-30 VITALS
HEIGHT: 60 IN | OXYGEN SATURATION: 99 % | WEIGHT: 210.98 LBS | RESPIRATION RATE: 19 BRPM | SYSTOLIC BLOOD PRESSURE: 143 MMHG | BODY MASS INDEX: 41.42 KG/M2 | TEMPERATURE: 98.9 F | HEART RATE: 99 BPM | DIASTOLIC BLOOD PRESSURE: 74 MMHG

## 2020-03-30 LAB
A/G RATIO: 1.1 (ref 1.1–2.2)
ABO/RH: NORMAL
ALBUMIN SERPL-MCNC: 4.3 G/DL (ref 3.4–5)
ALP BLD-CCNC: 89 U/L (ref 40–129)
ALT SERPL-CCNC: 9 U/L (ref 10–40)
ANION GAP SERPL CALCULATED.3IONS-SCNC: 14 MMOL/L (ref 3–16)
ANTIBODY SCREEN: NORMAL
APTT: 31.5 SEC (ref 24.2–36.2)
AST SERPL-CCNC: 17 U/L (ref 15–37)
BACTERIA: ABNORMAL /HPF
BASOPHILS ABSOLUTE: 0.1 K/UL (ref 0–0.2)
BASOPHILS RELATIVE PERCENT: 1.4 %
BILIRUB SERPL-MCNC: 0.9 MG/DL (ref 0–1)
BILIRUBIN URINE: NEGATIVE
BLOOD, URINE: ABNORMAL
BUN BLDV-MCNC: 14 MG/DL (ref 7–20)
CALCIUM SERPL-MCNC: 9.6 MG/DL (ref 8.3–10.6)
CHLORIDE BLD-SCNC: 95 MMOL/L (ref 99–110)
CLARITY: CLEAR
CO2: 28 MMOL/L (ref 21–32)
COLOR: YELLOW
CREAT SERPL-MCNC: 0.8 MG/DL (ref 0.6–1.2)
EOSINOPHILS ABSOLUTE: 0.2 K/UL (ref 0–0.6)
EOSINOPHILS RELATIVE PERCENT: 1.6 %
EPITHELIAL CELLS, UA: 34 /HPF (ref 0–5)
GFR AFRICAN AMERICAN: >60
GFR NON-AFRICAN AMERICAN: >60
GLOBULIN: 4 G/DL
GLUCOSE BLD-MCNC: 102 MG/DL (ref 70–99)
GLUCOSE URINE: NEGATIVE MG/DL
HCT VFR BLD CALC: 40.2 % (ref 36–48)
HEMOGLOBIN: 13 G/DL (ref 12–16)
HYALINE CASTS: ABNORMAL /LPF (ref 0–2)
INR BLD: 1.02 (ref 0.86–1.14)
KETONES, URINE: NEGATIVE MG/DL
LEUKOCYTE ESTERASE, URINE: NEGATIVE
LYMPHOCYTES ABSOLUTE: 2.7 K/UL (ref 1–5.1)
LYMPHOCYTES RELATIVE PERCENT: 26.5 %
MCH RBC QN AUTO: 27.6 PG (ref 26–34)
MCHC RBC AUTO-ENTMCNC: 32.5 G/DL (ref 31–36)
MCV RBC AUTO: 85.1 FL (ref 80–100)
MICROSCOPIC EXAMINATION: YES
MONOCYTES ABSOLUTE: 0.7 K/UL (ref 0–1.3)
MONOCYTES RELATIVE PERCENT: 7.1 %
NEUTROPHILS ABSOLUTE: 6.4 K/UL (ref 1.7–7.7)
NEUTROPHILS RELATIVE PERCENT: 63.4 %
NITRITE, URINE: NEGATIVE
PDW BLD-RTO: 15.6 % (ref 12.4–15.4)
PH UA: 5.5 (ref 5–8)
PLATELET # BLD: 351 K/UL (ref 135–450)
PMV BLD AUTO: 7.8 FL (ref 5–10.5)
POTASSIUM SERPL-SCNC: 3.5 MMOL/L (ref 3.5–5.1)
PROTEIN UA: NEGATIVE MG/DL
PROTHROMBIN TIME: 11.8 SEC (ref 10–13.2)
RBC # BLD: 4.72 M/UL (ref 4–5.2)
RBC UA: 1 /HPF (ref 0–4)
SODIUM BLD-SCNC: 137 MMOL/L (ref 136–145)
SPECIFIC GRAVITY UA: 1.02 (ref 1–1.03)
TOTAL PROTEIN: 8.3 G/DL (ref 6.4–8.2)
URINE REFLEX TO CULTURE: ABNORMAL
URINE TYPE: ABNORMAL
UROBILINOGEN, URINE: 0.2 E.U./DL
WBC # BLD: 10.1 K/UL (ref 4–11)
WBC UA: 3 /HPF (ref 0–5)

## 2020-03-30 PROCEDURE — 72125 CT NECK SPINE W/O DYE: CPT

## 2020-03-30 PROCEDURE — 86901 BLOOD TYPING SEROLOGIC RH(D): CPT

## 2020-03-30 PROCEDURE — 86900 BLOOD TYPING SEROLOGIC ABO: CPT

## 2020-03-30 PROCEDURE — 86850 RBC ANTIBODY SCREEN: CPT

## 2020-03-30 PROCEDURE — 85025 COMPLETE CBC W/AUTO DIFF WBC: CPT

## 2020-03-30 PROCEDURE — 80053 COMPREHEN METABOLIC PANEL: CPT

## 2020-03-30 PROCEDURE — 85730 THROMBOPLASTIN TIME PARTIAL: CPT

## 2020-03-30 PROCEDURE — 70450 CT HEAD/BRAIN W/O DYE: CPT

## 2020-03-30 PROCEDURE — 71260 CT THORAX DX C+: CPT

## 2020-03-30 PROCEDURE — 81001 URINALYSIS AUTO W/SCOPE: CPT

## 2020-03-30 PROCEDURE — 99284 EMERGENCY DEPT VISIT MOD MDM: CPT

## 2020-03-30 PROCEDURE — 6360000004 HC RX CONTRAST MEDICATION: Performed by: PHYSICIAN ASSISTANT

## 2020-03-30 PROCEDURE — 85610 PROTHROMBIN TIME: CPT

## 2020-03-30 RX ORDER — HYDROCODONE BITARTRATE AND ACETAMINOPHEN 5; 325 MG/1; MG/1
1 TABLET ORAL EVERY 6 HOURS PRN
Qty: 10 TABLET | Refills: 0 | Status: SHIPPED | OUTPATIENT
Start: 2020-03-30 | End: 2020-04-02

## 2020-03-30 RX ORDER — LIDOCAINE 50 MG/G
1 PATCH TOPICAL DAILY
Qty: 30 PATCH | Refills: 0 | Status: SHIPPED | OUTPATIENT
Start: 2020-03-30 | End: 2021-02-22

## 2020-03-30 RX ORDER — ONDANSETRON 4 MG/1
4 TABLET, ORALLY DISINTEGRATING ORAL EVERY 8 HOURS PRN
Qty: 20 TABLET | Refills: 0 | Status: SHIPPED | OUTPATIENT
Start: 2020-03-30 | End: 2021-02-22

## 2020-03-30 RX ADMIN — IOPAMIDOL 75 ML: 755 INJECTION, SOLUTION INTRAVENOUS at 22:00

## 2020-03-30 ASSESSMENT — ENCOUNTER SYMPTOMS
DIARRHEA: 0
RESPIRATORY NEGATIVE: 1
ABDOMINAL PAIN: 1
VOMITING: 0
PHOTOPHOBIA: 0
SHORTNESS OF BREATH: 0
BACK PAIN: 1
COLOR CHANGE: 0
CONSTIPATION: 0
COUGH: 0
NAUSEA: 0

## 2020-03-30 ASSESSMENT — PAIN DESCRIPTION - LOCATION: LOCATION: FLANK

## 2020-03-30 ASSESSMENT — PAIN SCALES - GENERAL: PAINLEVEL_OUTOF10: 8

## 2020-03-31 ENCOUNTER — TELEPHONE (OUTPATIENT)
Dept: SURGERY | Age: 70
End: 2020-03-31

## 2020-03-31 NOTE — ED PROVIDER NOTES
I independently performed a history and physical on [employer]. All diagnostic, treatment, and disposition decisions were made by myself in conjunction with the advanced practice provider. Briefly, this is a 68 y.o. female here for right sided chest wall pain 3/d sp mechanical fall at home into her bathtub. On exam, TTP to right chest wall. Screenings        MDM  4 rib fractures. Usually I'd do a trauma transfer for 24hrs of obs, but she's 3-4d out, and normoxic, breathing well. DC home. Patient Referrals:  Charles Flores MD  [address] [facility]  [phone]            Discharge Medications:  New Prescriptions    No medications on file       FINAL IMPRESSION  1. Fall, initial encounter    2. Closed fracture of multiple ribs of right side, initial encounter        Blood pressure (!) 143/74, pulse 99, temperature 98.9 °F (37.2 °C), temperature source Oral, resp. rate 19, height 5' (1.524 m), weight 210 lb 15.7 oz (95.7 kg), SpO2 99 %, not currently breastfeeding. For further details of [facility] emergency department encounter, please see documentation by advanced practice provider, [place].         Fanny Mirza MD  03/31/20 5995

## 2020-03-31 NOTE — ED NOTES
PT was educated on how to use the incentive spirometer, pt verbalized understanding. No further questions with d/c or follow up.      Destiny Mcdonald RN  03/31/20 0010

## 2020-03-31 NOTE — ED PROVIDER NOTES
medication usage. Nursing Notes were all reviewed and agreed with or any disagreements were addressed in the HPI. REVIEW OF SYSTEMS    (2-9 systems for level 4, 10 or more for level 5)     Review of Systems   Constitutional: Negative for activity change, appetite change, chills, diaphoresis, fatigue and fever. Eyes: Negative for photophobia and visual disturbance. Respiratory: Negative. Negative for cough and shortness of breath. Cardiovascular: Positive for chest pain. Negative for palpitations and leg swelling. Gastrointestinal: Positive for abdominal pain. Negative for constipation, diarrhea, nausea and vomiting. Genitourinary: Positive for flank pain. Negative for decreased urine volume, difficulty urinating, dysuria, frequency, hematuria, menstrual problem, pelvic pain and urgency. Musculoskeletal: Positive for arthralgias, back pain and myalgias. Negative for gait problem, joint swelling, neck pain and neck stiffness. Skin: Negative for color change, pallor, rash and wound. Neurological: Negative for dizziness, tremors, seizures, syncope, speech difficulty, weakness, light-headedness, numbness and headaches. Positives and Pertinent negatives as per HPI. Except as noted above in the ROS, all other systems were reviewed and negative.        PAST MEDICAL HISTORY     Past Medical History:   Diagnosis Date    Allergic rhinitis     Asthma     Back pain     GI bleeding 12/18/2014    Glaucoma     both eyes    Hypertension     Knee pain     Morbid obesity due to excess calories ([facility] [address] 11/2/2015    Osteoporosis     Type II or unspecified type diabetes mellitus without mention of complication, not stated as uncontrolled          SURGICAL HISTORY     Past Surgical History:   Procedure Laterality Date    COLONOSCOPY  9/27/2014    polyp removed    COLONOSCOPY  1/30/2020    COLONOSCOPY POLYPECTOMY SNARE/COLD BIOPSY performed by Shruti Sutherland MD at [address] [facility] 1/30/2020    COLONOSCOPY SUBMUCOSAL/BOTOX INJECTION performed by Dail Cushing, MD at [address]     knee scoped and \"cleaned out\"         CURRENTMEDICATIONS       Previous Medications    ACCU-CHEK SOFTCLIX LANCETS MISC    USE TO TEST BLOOD SUGAR 3 TIMES A DAY    ACETAMINOPHEN (TYLENOL) 500 MG TABLET    Take 1 tablet by mouth 4 times daily as needed for Pain    ALBUTEROL (PROVENTIL) (2.5 MG/3ML) 0.083% NEBULIZER SOLUTION    INHALE CONTENTS OF 1 VIAL (3ML) BY MOUTH VIA NEBULIZER 3 TIMES A DAY    ALBUTEROL SULFATE HFA (VENTOLIN HFA) 108 (90 BASE) MCG/ACT INHALER    Inhale 2 puffs into the lungs every 6 hours as needed for Wheezing    ALCOHOL SWABS PADS    Use TID to test blood glucose    ALENDRONATE (FOSAMAX) 70 MG TABLET    TAKE 1 TAB (70MG) BY MOUTH PER WK BEFORE BREAKFAST EMPTY STOMACH SIT UP 30MIN (BONES)    AMLODIPINE (NORVASC) 10 MG TABLET    TAKE 1 TABLET (10MG) BY MOUTH EVERY DAY (BLOOD PRESSURE)    ATORVASTATIN (LIPITOR) 40 MG TABLET    TAKE 1 TABLET (40MG) BY MOUTH EVERY DAY (CHOLESTEROL)    B-D ULTRAFINE III SHORT PEN 31G X 8 MM MISC    USE TO INJECT INSULIN (QID)    BECLOMETHASONE (QVAR) 80 MCG/ACT INHALER    Inhale 2 puffs into the lungs 2 times daily    BIMATOPROST (LUMIGAN) 0.01 % SOLN    Apply  to eye. One drop in each eye at bedtime     BLOOD GLUCOSE MONITORING SUPPL (TRUE METRIX METER) W/DEVICE KIT    Test glucose TID    BLOOD GLUCOSE MONITORING SUPPL GRETCHEN    Dispense one true track meter   Test glucose twice a day    BLOOD GLUCOSE TEST STRIPS (ACCU-CHEK BRAVO PLUS) STRIP    USE TO TEST GLUCOSE THREE TIMES DAILY    BRIMONIDINE (ALPHAGAN P) 0.1 % SOLN    1 drop 2 times daily.  One drop in both eyes every 12 hours    BUDESONIDE-FORMOTEROL (SYMBICORT) 160-4.5 MCG/ACT AERO    Inhale 2 puffs into the lungs 2 times daily    DORZOLAMIDE (TRUSOPT) 2 % OPHTHALMIC SOLUTION        EXENATIDE (BYDUREON) 2 MG PEN    Inject 1 pen into the skin every 7 days daily for 10 days Cancel bactrimDS    PRAVASTATIN (PRAVACHOL) 20 MG TABLET    Take one tab a day cancel script pravastatin 10 mg    SERTRALINE (ZOLOFT) 50 MG TABLET    TAKE 2 TABLETS (100MG) BY MOUTH 2 TIMES A DAY (MOOD)    TIOTROPIUM (SPIRIVA RESPIMAT) 1.25 MCG/ACT AERS INHALER    Inhale 2 puffs into the lungs daily    TIZANIDINE (ZANAFLEX) 2 MG TABLET    One or two tabs prn bedtime    VENTOLIN  (90 BASE) MCG/ACT INHALER    INHALE 2 PUFFS BY MOUTH EVERY 6 HOURS AS NEEDED         ALLERGIES     Pantoprazole sodium; Bactrim [sulfamethoxazole-trimethoprim]; Enalapril; Lisinopril; Milk-related compounds; Pcn [penicillins]; and Tape [adhesive tape]    FAMILYHISTORY       Family History   Problem Relation Age of Onset    Diabetes Mother     Cancer Father           SOCIAL HISTORY       Social History     Tobacco Use    Smoking status: Former Smoker     Packs/day: 0.50     Years: 20.00     Pack years: 10.00     Types: Cigarettes     Start date: 1984     Last attempt to quit: 1993     Years since quittin.5    Smokeless tobacco: Never Used   Substance Use Topics    Alcohol use: No    Drug use: No       SCREENINGS             PHYSICAL EXAM    (up to 7 for level 4, 8 or more for level 5)     ED Triage Vitals [20 1850]   BP Temp Temp Source Pulse Resp SpO2 Height Weight   125/85 98.9 °F (37.2 °C) Oral 99 19 93 % 5' (1.524 m) 210 lb 15.7 oz (95.7 kg)       Physical Exam  Constitutional:       General: She is not in acute distress. Appearance: Normal appearance. She is well-developed. She is not ill-appearing, toxic-appearing or diaphoretic. HENT:      Head: Normocephalic and atraumatic. Comments: Atraumatic. No raccoon eyes or barragan sign. No epistaxis. No septal hematoma. No trismus or jaw malocclusion. No evidence [place] fracture. Right Ear: External ear normal.      Left Ear: External ear normal.   Eyes:      General:         Right eye: No discharge.          Left eye: No discharge. Extraocular Movements: Extraocular movements intact. Conjunctiva/sclera: Conjunctivae normal.      Pupils: Pupils are equal, round, and reactive to light. Neck:      Musculoskeletal: Normal range of motion and neck supple. No neck rigidity or muscular tenderness. Cardiovascular:      Rate and Rhythm: Normal rate and regular rhythm. Pulses: Normal pulses. Heart sounds: Normal heart sounds. No murmur. No friction rub. No gallop. Pulmonary:      Effort: Pulmonary effort is normal. No respiratory distress. Breath sounds: Normal breath sounds. No stridor. No wheezing, rhonchi or rales. Chest:      Chest wall: Tenderness present. Abdominal:      General: Abdomen is flat. Bowel sounds are normal. There is no distension. Palpations: Abdomen is soft. There is no mass. Tenderness: There is abdominal tenderness in the right upper quadrant. There is no right CVA tenderness, left CVA tenderness, guarding or rebound. Negative signs include Blackwell's sign, Rovsing's sign, McBurney's sign, psoas sign and obturator sign. Hernia: No hernia is present. Comments: No bruising or skin changes noted. Musculoskeletal: Normal range of motion. Comments: Tenderness palpation of the right inferior lateral rib cage and right upper quadrant of the abdomen. No CVA tenderness. No skin changes. No bruising. No crepitus or step-off. No TTP to the midline cervical, thoracic or lumbar spine. No anterior chest wall tenderness. No pelvis instability. No TTP to the upper and lower extremities throughout. Full range of motion strength of the upper and lower extremity throughout. Distal neurovascular intact. Gait deferred. Lymphadenopathy:      Cervical: No cervical adenopathy. Skin:     General: Skin is warm and dry. Coloration: Skin is not pale. Findings: No erythema or rash. Neurological:      General: No focal deficit present.       Mental Status: She is obtained. Coags obtained. Type and screen obtained. CBC showed normal white count, hemoglobin and platelets. CMP relatively unremarkable. Urinalysis unremarkable other than 1+ bacteria but there were 34 epithelial cells most likely secondary to contamination. No significant blood or infection. CT of the chest abdomen and pelvis showed uncomplicated right fifth through eighth rib fractures. No evidence of intrathoracic or intra-abdominal pelvic injury. CT of the head and cervical spine unremarkable. Given history and physical examination suffering from fall with associated right rib fractures. Patient's injury happened 3 days ago. Normally in patients age group would discuss transfer to trauma facility/admission for further evaluation and treatment but since patient symptoms happened 3 days ago with no evidence of hypoxia, respiratory distress or significant pain believe can be safely discharged home with close outpatient follow-up. Return the ED for any worsening symptoms. Low suspicion for pneumothorax, hemothorax, respiratory distress, intractable pain, splenic injury, liver injury, kidney injury, cervical fracture, intracranial injury or other emergent condition at this time. Will give small prescription for Norco, Lidoderm and Zofran for home for breakthrough pain. Given incentive spirometer. FINAL IMPRESSION      1. Fall, initial encounter    2. Closed fracture of multiple ribs of right side, initial encounter          DISPOSITION/PLAN   DISPOSITION Decision To Discharge 03/30/2020 11:13:53 PM      PATIENT REFERREDTO:  Arian Gutierrez MD  [place] [address]  [address]  [phone]    Schedule an appointment as soon as possible for a visit   For a Re-check in 2-3     days.     [facility] Emergency [facility]  [address] [facility]  [address] [place] [address]  [phone]  Go to   As needed, If symptoms worsen      DISCHARGE MEDICATIONS:  New Prescriptions    HYDROCODONE-ACETAMINOPHEN (NORCO) 5-325 MG PER TABLET    Take 1 tablet by mouth every 6 hours as needed for Pain for up to 3 days. LIDOCAINE (LIDODERM) 5 %    Place 1 patch onto the skin daily 12 hours on, 12 hours off.     ONDANSETRON (ZOFRAN ODT) 4 MG DISINTEGRATING TABLET    Take 1 tablet by mouth every 8 hours as needed for Nausea       DISCONTINUED MEDICATIONS:  Discontinued Medications    No medications on file              (Please note that portions of this note were completed with a voice recognition program.  Efforts were made to edit the dictations but occasionally words are mis-transcribed.)    TAMRA Robledo (electronically signed)          TAMRA Velez  03/30/20 9925

## 2020-03-31 NOTE — TELEPHONE ENCOUNTER
Lara Rodriguez last Saturday at home a broke several ribs. Dr. Vanessa Charles would like her to put off surgery until she is fully healed. They will call back with updates.

## 2020-04-01 ENCOUNTER — CARE COORDINATION (OUTPATIENT)
Dept: FAMILY MEDICINE CLINIC | Age: 70
End: 2020-04-01

## 2020-04-15 ENCOUNTER — CARE COORDINATION (OUTPATIENT)
Dept: CARE COORDINATION | Age: 70
End: 2020-04-15

## 2020-04-17 ENCOUNTER — VIRTUAL VISIT (OUTPATIENT)
Dept: PRIMARY CARE CLINIC | Age: 70
End: 2020-04-17
Payer: MEDICARE

## 2020-04-17 VITALS — SYSTOLIC BLOOD PRESSURE: 134 MMHG | DIASTOLIC BLOOD PRESSURE: 84 MMHG

## 2020-04-17 PROCEDURE — 99213 OFFICE O/P EST LOW 20 MIN: CPT | Performed by: FAMILY MEDICINE

## 2020-04-17 RX ORDER — ACETAMINOPHEN 500 MG
500 TABLET ORAL 4 TIMES DAILY PRN
Qty: 100 TABLET | Refills: 1 | Status: SHIPPED | OUTPATIENT
Start: 2020-04-17 | End: 2021-02-22

## 2020-04-17 RX ORDER — BLOOD PRESSURE TEST KIT
KIT MISCELLANEOUS
Qty: 1 KIT | Refills: 0 | Status: SHIPPED | OUTPATIENT
Start: 2020-04-17

## 2020-04-17 ASSESSMENT — ENCOUNTER SYMPTOMS
EYE DISCHARGE: 0
TROUBLE SWALLOWING: 0
EYE PAIN: 0
SORE THROAT: 0
NAUSEA: 0
ABDOMINAL DISTENTION: 0
SINUS PRESSURE: 0
EYE REDNESS: 0
VOMITING: 0
CHOKING: 0
PHOTOPHOBIA: 0
EYE ITCHING: 0
RECTAL PAIN: 0
RHINORRHEA: 0
WHEEZING: 0
APNEA: 0
COUGH: 0
SHORTNESS OF BREATH: 0
CONSTIPATION: 0
BLOOD IN STOOL: 0
STRIDOR: 0
CHEST TIGHTNESS: 0
DIARRHEA: 0
COLOR CHANGE: 0
BACK PAIN: 0

## 2020-04-17 NOTE — PROGRESS NOTES
confusion, decreased concentration, dysphoric mood, hallucinations, self-injury, sleep disturbance and suicidal ideas. The patient is not nervous/anxious and is not hyperactive. Objective:   Physical Exam  Constitutional:       General: She is not in acute distress. Appearance: Normal appearance. She is normal weight. She is not toxic-appearing or diaphoretic. HENT:      Head: Normocephalic and atraumatic. Right Ear: External ear normal.      Left Ear: External ear normal.      Nose: Nose normal.      Mouth/Throat:      Mouth: Mucous membranes are moist.   Eyes:      Pupils: Pupils are equal, round, and reactive to light. Neck:      Musculoskeletal: Normal range of motion. Pulmonary:      Effort: Pulmonary effort is normal. No respiratory distress. Breath sounds: No stridor. No wheezing. Comments: Tenderness over right chest wall area when patient  Presses the area  Abdominal:      Palpations: Abdomen is soft. Musculoskeletal:      Comments: Tenderness over Right lateral chest wall when  Pressed per patient   Neurological:      Mental Status: She is alert. Assessment:      1. Closed fracture of multiple ribs of right side, initial encounter  No resp distress  add  - acetaminophen (TYLENOL) 500 MG tablet; Take 1 tablet by mouth 4 times daily as needed for Pain  Dispense: 100 tablet;  Refill: 1    Discussed treatment options for rib fxs  No current complication  Should heal 4 -6 weeks      Plan:           Talia Birmingham MD

## 2020-04-23 ENCOUNTER — TELEPHONE (OUTPATIENT)
Dept: PRIMARY CARE CLINIC | Age: 70
End: 2020-04-23

## 2020-04-23 RX ORDER — INSULIN GLARGINE 100 [IU]/ML
INJECTION, SOLUTION SUBCUTANEOUS
Qty: 9 ML | Refills: 10 | Status: SHIPPED | OUTPATIENT
Start: 2020-04-23 | End: 2020-09-21 | Stop reason: SDUPTHER

## 2020-05-05 ENCOUNTER — TELEPHONE (OUTPATIENT)
Dept: PRIMARY CARE CLINIC | Age: 70
End: 2020-05-05

## 2020-05-05 RX ORDER — UMECLIDINIUM BROMIDE AND VILANTEROL TRIFENATATE 62.5; 25 UG/1; UG/1
1 POWDER RESPIRATORY (INHALATION) DAILY
Qty: 60 EACH | Refills: 1 | Status: SHIPPED | OUTPATIENT
Start: 2020-05-05 | End: 2020-10-07

## 2020-05-05 NOTE — TELEPHONE ENCOUNTER
[address] [facility] pharmacist rreports  sxs of hypoglycemia , glucose in 70's  Twice in a week    Stop novolog

## 2020-06-04 RX ORDER — AMLODIPINE BESYLATE 10 MG/1
TABLET ORAL
Qty: 30 TABLET | Refills: 0 | Status: SHIPPED | OUTPATIENT
Start: 2020-06-04 | End: 2020-06-30

## 2020-06-04 RX ORDER — ERGOCALCIFEROL 1.25 MG/1
CAPSULE ORAL
Qty: 1 CAPSULE | Refills: 0 | Status: SHIPPED | OUTPATIENT
Start: 2020-06-04 | End: 2020-06-30

## 2020-06-04 RX ORDER — ALBUTEROL SULFATE 90 UG/1
AEROSOL, METERED RESPIRATORY (INHALATION)
Qty: 1 INHALER | Refills: 0 | Status: SHIPPED | OUTPATIENT
Start: 2020-06-04 | End: 2020-06-30

## 2020-06-12 ENCOUNTER — VIRTUAL VISIT (OUTPATIENT)
Dept: PRIMARY CARE CLINIC | Age: 70
End: 2020-06-12
Payer: MEDICARE

## 2020-06-12 PROCEDURE — 99213 OFFICE O/P EST LOW 20 MIN: CPT | Performed by: FAMILY MEDICINE

## 2020-06-12 RX ORDER — ZOSTER VACCINE RECOMBINANT, ADJUVANTED 50 MCG/0.5
0.5 KIT INTRAMUSCULAR ONCE
Qty: 0.5 ML | Refills: 0 | Status: SHIPPED | OUTPATIENT
Start: 2020-06-12 | End: 2020-06-12

## 2020-06-12 ASSESSMENT — ENCOUNTER SYMPTOMS
APNEA: 0
EYE PAIN: 0
BACK PAIN: 0
EYE REDNESS: 0
CONSTIPATION: 0
RECTAL PAIN: 0
DIARRHEA: 0
WHEEZING: 0
EYE ITCHING: 0
CHEST TIGHTNESS: 0
COLOR CHANGE: 0
VOMITING: 0
RHINORRHEA: 0
SHORTNESS OF BREATH: 0
SINUS PRESSURE: 0
EYE DISCHARGE: 0
ABDOMINAL DISTENTION: 0
COUGH: 0
CHOKING: 0
BLOOD IN STOOL: 0
NAUSEA: 0
TROUBLE SWALLOWING: 0
STRIDOR: 0
SORE THROAT: 0
PHOTOPHOBIA: 0

## 2020-06-12 NOTE — PROGRESS NOTES
Subjective:      Patient ID: Dana Simpson is a 68 y.o. female. Dana Simpson is a 68 y.o. female being evaluated by a Virtual Visit (video visit) encounter to address concerns as mentioned above. A caregiver was present when appropriate. Due to this being a TeleHealth encounter (During WOICL-10 public health emergency), evaluation of the following organ systems was limited: Vitals/Constitutional/EENT/Resp/CV/GI//MS/Neuro/Skin/Heme-Lymph-Imm. Pursuant to the emergency declaration under the [facility], [address] [place] [address] authority and the [employer] Act, this Virtual Visit was conducted with patient's (and/or legal guardian's) consent, to reduce the patient's risk of exposure to COVID-19 and provide necessary medical care. The patient (and/or legal guardian) has also been advised to contact this office for worsening conditions or problems, and seek emergency medical treatment and/or call 911 if deemed necessary. Patient identification was verified at the start of the visit: Yes    Total time spent for this encounter: 15 minutes    Services were provided through a video synchronous discussion virtually to substitute for in-person clinic visit. Patient and provider were located at their individual homes. --Regina Patel MD on 6/12/2020 at 11:17 AM    An electronic signature was used to authenticate this note. Fu rib cage pain/rib fx  Dm-2    HPI  67 y/o female known  diabetic, CKD stage 3, morbid obese, right rib,  Cage fx 3/2020, adeno ca in situ descending colon with neg mets work up  Surgery canceled for 3/13/2020  , for [facility]. Surgeon Dr Merari Rodriguez.    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    HENT: Negative for congestion, dental

## 2020-06-23 ENCOUNTER — TELEPHONE (OUTPATIENT)
Dept: SURGERY | Age: 70
End: 2020-06-23

## 2020-06-23 ENCOUNTER — HOSPITAL ENCOUNTER (OUTPATIENT)
Age: 70
Discharge: HOME | End: 2020-06-23
Payer: MEDICARE

## 2020-06-23 ENCOUNTER — HOSPITAL ENCOUNTER (OUTPATIENT)
Dept: GENERAL RADIOLOGY | Age: 70
Discharge: HOME | End: 2020-06-23
Payer: MEDICARE

## 2020-06-23 LAB
CHOLESTEROL, FASTING: 89 MG/DL (ref 0–199)
HDLC SERPL-MCNC: 54 MG/DL (ref 40–60)
LDL CHOLESTEROL CALCULATED: 25 MG/DL
TRIGLYCERIDE, FASTING: 49 MG/DL (ref 0–150)
VLDLC SERPL CALC-MCNC: 10 MG/DL

## 2020-06-23 PROCEDURE — 36415 COLL VENOUS BLD VENIPUNCTURE: CPT

## 2020-06-23 PROCEDURE — 80061 LIPID PANEL: CPT

## 2020-06-23 PROCEDURE — 83036 HEMOGLOBIN GLYCOSYLATED A1C: CPT

## 2020-06-23 PROCEDURE — 71100 X-RAY EXAM RIBS UNI 2 VIEWS: CPT

## 2020-06-23 NOTE — TELEPHONE ENCOUNTER
Patient to call back Friday after seeing her PCP to set up date and time for surgery with Dr Justin Sanchez

## 2020-06-24 LAB
ESTIMATED AVERAGE GLUCOSE: 85.3 MG/DL
HBA1C MFR BLD: 4.6 %

## 2020-06-26 ENCOUNTER — OFFICE VISIT (OUTPATIENT)
Dept: PRIMARY CARE CLINIC | Age: 70
End: 2020-06-26
Payer: MEDICARE

## 2020-06-26 VITALS
SYSTOLIC BLOOD PRESSURE: 139 MMHG | TEMPERATURE: 97.2 F | OXYGEN SATURATION: 100 % | BODY MASS INDEX: 40.44 KG/M2 | HEART RATE: 78 BPM | HEIGHT: 60 IN | WEIGHT: 206 LBS | DIASTOLIC BLOOD PRESSURE: 81 MMHG

## 2020-06-26 PROCEDURE — G0439 PPPS, SUBSEQ VISIT: HCPCS | Performed by: FAMILY MEDICINE

## 2020-06-26 RX ORDER — ZOSTER VACCINE RECOMBINANT, ADJUVANTED 50 MCG/0.5
0.5 KIT INTRAMUSCULAR ONCE
Qty: 0.5 ML | Refills: 0 | Status: SHIPPED | OUTPATIENT
Start: 2020-06-26 | End: 2020-06-26

## 2020-06-26 ASSESSMENT — LIFESTYLE VARIABLES: HOW OFTEN DO YOU HAVE A DRINK CONTAINING ALCOHOL: 0

## 2020-06-26 ASSESSMENT — PATIENT HEALTH QUESTIONNAIRE - PHQ9
SUM OF ALL RESPONSES TO PHQ QUESTIONS 1-9: 0
SUM OF ALL RESPONSES TO PHQ QUESTIONS 1-9: 0

## 2020-06-26 NOTE — PROGRESS NOTES
Medicare Annual Wellness Visit  Name: Ramiro Rasmussen Date: 2020   MRN: [id number] Sex: Female   Age: 68 y.o. Ethnicity: Declined   : 1950 Race: Black      Kianna Mckenzie is here for No chief complaint on file. Screenings for behavioral, psychosocial and functional/safety risks, and cognitive dysfunction are all negative except as indicated below. These results, as well as other patient data from the [address] form, are documented in Flowsheets linked to this Encounter. Allergies   Allergen Reactions    Pantoprazole Sodium Hives    Bactrim [Sulfamethoxazole-Trimethoprim]      ITCHING HIVES, NO RESPIRATORY SXS    Enalapril Swelling     angioedema    Lisinopril Swelling     Lip swelling/angioedema    Milk-Related Compounds Other (See Comments)     Caused GI bleeding (cow's milk)    Pcn [Penicillins] Hives    Tape [Adhesive Tape] Other (See Comments)     Paper tape causes redness, irritation       Prior to Visit Medications    Medication Sig Taking?  Authorizing Provider   metoprolol tartrate (LOPRESSOR) 25 MG tablet TAKE 1 TABLET (25MG) BY MOUTH 2 TIMES A DAY (BLOOD PRESSURE) Yes Yary Waldron MD   sertraline (ZOLOFT) 50 MG tablet TAKE 2 TABLETS (100MG) BY MOUTH 2 TIMES A DAY (MOOD) Yes Yary Waldron MD   amLODIPine (NORVASC) 10 MG tablet TAKE 1 TABLET (10MG) BY MOUTH EVERY DAY (BLOOD PRESSURE) Yes Yary Waldron MD   vitamin D (ERGOCALCIFEROL) 1.25 MG (86826 UT) CAPS capsule TAKE 1 CAPSULE (50K IU) EACH MONTH (SUPPLEMENT) Yes Yary Waldron MD   albuterol sulfate  (90 Base) MCG/ACT inhaler INHALE 2 PUFFS BY MOUTH EVERY 6 HOURS AS NEEDED Yes Yary Waldron MD   umeclidinium-vilanterol (ANORO ELLIPTA) 62.5-25 MCG/INH AEPB inhaler Inhale 1 puff into the lungs daily Stop  spiriva Yes Yary Waldron MD   insulin glargine (BASAGLAR KWIKPEN) 100 UNIT/ML injection pen INJECT 25 UNITS UNDER SKIN EVERY DAY AT BEDTIME Yes Yary Waldron MD   acetaminophen (TYLENOL) 500 MG tablet Take 1 tablet by mouth 4 times daily as needed for Pain Yes Etheleen Frankel, MD   Blood Pressure KIT Ck blood pressure once a week Yes Etheleen Frankel, MD   alendronate (FOSAMAX) 70 MG tablet TAKE 1 TAB (70MG) BY MOUTH PER WK BEFORE BREAKFAST EMPTY STOMACH SIT UP 30MIN (BONES) Yes Etheleen Frankel, MD   hydroCHLOROthiazide (HYDRODIURIL) 25 MG tablet TAKE 1 TABLET (25MG) BY MOUTH EVERY DAY (DIURECTIC/ BLOOD PRESSURE) Yes Etheleen Frankel, MD   lidocaine (LIDODERM) 5 % Place 1 patch onto the skin daily 12 hours on, 12 hours off. Yes TAMRA Mauricio   ondansetron (ZOFRAN ODT) 4 MG disintegrating tablet Take 1 tablet by mouth every 8 hours as needed for Nausea Yes TAMRA Mauricio   methylPREDNISolone (MEDROL, ANSELMO,) 4 MG tablet Take by mouth. Yes Etheleen Frankel, MD   metroNIDAZOLE (FLAGYL) 500 MG tablet Take one tablet by mouth 3 times on the day prior to surgery. Take one tablet at 1pm, 2pm and 9pm. Yes Milton Willis MD   neomycin (MYCIFRADIN) 500 MG tablet Take two tablet 3 times the day before surgery. Take two tablet at 1pm, two tablet at 2pm and two tablet at 9pm. Yes Milton Willis MD   metroNIDAZOLE (FLAGYL) 500 MG tablet Take one tablet by mouth 3 times on the day prior to surgery. Take one tablet at 1pm, 2pm and 9pm. Yes Milton Willis MD   neomycin (MYCIFRADIN) 500 MG tablet Take two tablet 3 times the day before surgery.  Take two tablet at 1pm, two tablet at 2pm and two tablet at 9pm. Yes Milton Willis MD   loratadine (ALLERGY RELIEF) 10 MG tablet TAKE 1 TABLET (10MG) BY MOUTH EVERY DAY (ALLERGIES) Yes Etheleen Frankel, MD   famotidine (PEPCID) 20 MG tablet One a day Yes Etheleen Frankel, MD   blood glucose test strips (ACCU-CHEK BRAVO PLUS) strip USE TO TEST GLUCOSE THREE TIMES DAILY Yes Etheleen Frankel, MD   atorvastatin (LIPITOR) 40 MG tablet TAKE 1 TABLET (40MG) BY MOUTH EVERY DAY (CHOLESTEROL) Yes Etheleen Frankel, MD   pravastatin (PRAVACHOL) 20 MG tablet Take one tab a day cancel script pravastatin 10 mg Yes Bradley Ridley MD   metFORMIN (GLUCOPHAGE) 1000 MG tablet TAKE 1 TABLET (1000MG) BY MOUTH 2 TIMES A DAY WITH MEALS (BLOOD SUGAR/DIABETES) Yes Bradley Ridley MD   fluticasone-salmeterol (ADVAIR DISKUS) 250-50 MCG/DOSE AEPB Inhale 1 puff into the lungs every 12 hours Yes Bradley Ridley MD   montelukast (SINGULAIR) 10 MG tablet Take 1 tablet by mouth daily Yes Bradley Ridley MD   Blood Glucose Monitoring Suppl (TRUE METRIX METER) w/Device KIT Test glucose TID Yes Bradley Ridley MD   Exenatide (BYDUREON) 2 MG PEN Inject 1 pen into the skin every 7 days Yes Bradley Ridley MD   fluticasone (FLONASE) 50 MCG/ACT nasal spray 1 spray by Nasal route daily Yes Bradley Ridley MD   Insulin Degludec (TRESIBA) 100 UNIT/ML SOLN Inject 25 units under skin daily Yes Bradley Ridley MD   acetaminophen (TYLENOL) 500 MG tablet Take 1 tablet by mouth 4 times daily as needed for Pain Yes Bradley Ridley MD   albuterol (PROVENTIL) (2.5 MG/3ML) 0.083% nebulizer solution INHALE CONTENTS OF 1 VIAL (3ML) BY MOUTH VIA NEBULIZER 3 Marilyn Fox Yes MD DURGA Oconnor ULTRAFINE III SHORT PEN 31G X 8 MM MISC USE TO INJECT INSULIN (QID) Yes Bradley Ridley MD   tiZANidine (ZANAFLEX) 2 MG tablet One or two tabs prn bedtime Yes Bradley Ridley MD   budesonide-formoterol (SYMBICORT) 160-4.5 MCG/ACT AERO Inhale 2 puffs into the lungs 2 times daily Yes Bradley Ridley MD   Alcohol Swabs PADS Use TID to test blood glucose Yes Bradley Ridley MD   ACCU-CHEK SOFTCLIX LANCETS MISC USE TO TEST BLOOD SUGAR 3 TIMES A DAY Yes Bradley Ridley MD   beclomethasone (QVAR) 80 MCG/ACT inhaler Inhale 2 puffs into the lungs 2 times daily Yes Bradley Ridley MD   albuterol sulfate HFA (VENTOLIN HFA) 108 (90 Base) MCG/ACT inhaler Inhale 2 puffs into the lungs every 6 hours as needed for Wheezing Yes Bradley Ridley MD   Blood Glucose Monitoring Suppl GRETCHEN Dispense one true track meter   Test glucose twice a day Yes Bradley Ridley MD   Multiple Vitamins-Minerals intact. General Appearance: alert and oriented to person, place and time, well developed and well- nourished, in no acute distress  Skin: warm and dry, no rash or erythema  Head: normocephalic and atraumatic  Eyes: pupils equal, round, and reactive to light, extraocular eye movements intact, conjunctivae normal  ENT: tympanic membrane, external ear and ear canal normal bilaterally, nose without deformity, nasal mucosa and turbinates normal without polyps  Neck: supple and non-tender without mass, no thyromegaly or thyroid nodules, no cervical lymphadenopathy  Pulmonary/Chest: clear to auscultation bilaterally- no wheezes, rales or rhonchi, normal air movement, no respiratory distress  Cardiovascular: normal rate, regular rhythm, normal S1 and S2, no murmurs, rubs, clicks, or gallops, distal pulses intact, no carotid bruits  Abdomen: soft, non-tender, non-distended, normal bowel sounds, no masses or organomegaly  Extremities: no cyanosis, clubbing or edema  Musculoskeletal: normal range of motion, no joint swelling, deformity or tenderness  Neurologic: reflexes normal and symmetric, no cranial nerve deficit, gait, coordination and speech normal    Patient's complete Health Risk Assessment and screening values have been reviewed and are found in Flowsheets. The following problems were reviewed today and where indicated follow up appointments were made and/or referrals ordered. Positive Risk Factor Screenings with Interventions:     Fall Risk:  2 or more falls in past year?: (!) yes  Fall with injury in past year?: (!) yes  Fall Risk Interventions:    · Patient declines any further evaluation/treatment for this issue  · Went to bathroom without turning on light first  · Urged to keep place lighted when getting out of bed    General Health:  General  In general, how would you say your health is?: Fair  In the past 7 days, have you experienced any of the following?  New or Increased Pain, New or Increased Fatigue, Loneliness, Social Isolation, Stress or Anger?: None of These  Do you get the social and emotional support that you need?: Yes  Do you have a Living Will?: (!) No  General Health Risk Interventions:  · has living will but has not had it signed yet    Health Habits/Nutrition:  Health Habits/Nutrition  Do you exercise for at least 20 minutes 2-3 times per week?: (!) No  Have you lost any weight without trying in the past 3 months?: No  Do you eat fewer than 2 meals per day?: No  Have you seen a dentist within the past year?: (!) No  Body mass index is 40.23 kg/m².   Health Habits/Nutrition Interventions:  · Inadequate physical activity:  patient agrees to exercise for at least 150 minutes/week    Hearing/Vision:  No exam data present  Hearing/Vision  Do you or your family notice any trouble with your hearing?: No  Do you have difficulty driving, watching TV, or doing any of your daily activities because of your eyesight?: No  Have you had an eye exam within the past year?: (!) No  Hearing/Vision Interventions:  · Vision concerns:  patient encouraged to make appointment with his/her eye specialist    Safety:  Safety  Do you have working smoke detectors?: Yes  Have all throw rugs been removed or fastened?: Yes  Do you have non-slip mats or surfaces in all bathtubs/showers?: (!) No  Do all of your stairways have a railing or banister?: Yes  Are your doorways, halls and stairs free of clutter?: Yes  Do you always fasten your seatbelt when you are in a car?: Yes  Safety Interventions:  · pt has tile floor, will get non slip mats     Personalized Preventive Plan   Current Health Maintenance Status  Immunization History   Administered Date(s) Administered    Influenza Vaccine, unspecified formulation 09/18/2013, 10/15/2014, 11/02/2015, 12/28/2016    Influenza Virus Vaccine 12/31/2007    Influenza, High Dose (Fluzone 65 yrs and older) 09/18/2013, 10/15/2014, 11/02/2015, 12/28/2016, 09/15/2017, 12/07/2018    Influenza,

## 2020-06-27 ENCOUNTER — TELEPHONE (OUTPATIENT)
Dept: PRIMARY CARE CLINIC | Age: 70
End: 2020-06-27

## 2020-07-15 ENCOUNTER — TELEPHONE (OUTPATIENT)
Dept: SURGERY | Age: 70
End: 2020-07-15

## 2020-07-24 NOTE — TELEPHONE ENCOUNTER
Crystal Lopez has some concerns regarding the surgery and covid risk. Do you think she should wait to schedule?

## 2020-07-24 NOTE — TELEPHONE ENCOUNTER
She is high risk for COVID unfortunately.  She can wait, but depending on how long she wants to wait, we can get her to another C-scope to make sure her lesion hasn't grown

## 2020-07-27 ENCOUNTER — TELEPHONE (OUTPATIENT)
Dept: PRIMARY CARE CLINIC | Age: 70
End: 2020-07-27

## 2020-07-27 RX ORDER — LORATADINE 10 MG/1
TABLET ORAL
Qty: 30 TABLET | Refills: 12 | Status: SHIPPED | OUTPATIENT
Start: 2020-07-27 | End: 2021-08-09

## 2020-07-27 NOTE — TELEPHONE ENCOUNTER
----- Message from Alexia Carrasco sent at 7/27/2020 10:13 AM EDT -----  Subject: Refill Request    QUESTIONS  Name of Medication? atorvastatin (LIPITOR) 40 MG tablet  Patient-reported dosage and instructions? 40 mg  How many days do you have left? 5  Preferred Pharmacy? [address] [place] [address] [phone]  Pharmacy phone number (if available)? [phone]  Additional Information for Provider?   ---------------------------------------------------------------------------  --------------  CALL BACK INFO  What is the best way for the office to contact you? OK to leave message on   voicemail  Preferred Call Back Phone Number?  [phone]

## 2020-07-30 RX ORDER — HYDROCHLOROTHIAZIDE 25 MG/1
TABLET ORAL
Qty: 30 TABLET | Refills: 5 | Status: SHIPPED | OUTPATIENT
Start: 2020-07-30 | End: 2020-12-28 | Stop reason: SDUPTHER

## 2020-07-30 RX ORDER — AMLODIPINE BESYLATE 10 MG/1
TABLET ORAL
Qty: 30 TABLET | Refills: 5 | Status: SHIPPED | OUTPATIENT
Start: 2020-07-30 | End: 2020-09-21 | Stop reason: SDUPTHER

## 2020-07-30 RX ORDER — ATORVASTATIN CALCIUM 40 MG/1
TABLET, FILM COATED ORAL
Qty: 30 TABLET | Refills: 10 | Status: SHIPPED | OUTPATIENT
Start: 2020-07-30 | End: 2020-12-04 | Stop reason: SDUPTHER

## 2020-07-30 RX ORDER — FAMOTIDINE 20 MG/1
TABLET, FILM COATED ORAL
Qty: 60 TABLET | Refills: 10 | Status: SHIPPED | OUTPATIENT
Start: 2020-07-30 | End: 2020-09-21 | Stop reason: SDUPTHER

## 2020-08-28 ENCOUNTER — TELEPHONE (OUTPATIENT)
Dept: PRIMARY CARE CLINIC | Age: 70
End: 2020-08-28

## 2020-08-28 NOTE — TELEPHONE ENCOUNTER
----- Message from Venkat Mony sent at 8/28/2020  1:15 PM EDT -----  Subject: Message to Provider    QUESTIONS  Information for Provider? Pt called in for a new script for the   handicapped placard (2) Please mail script to patient  ---------------------------------------------------------------------------  --------------  [address]  What is the best way for the office to contact you? OK to leave message on   voicemail  Preferred Call Back Phone Number? [phone]  ---------------------------------------------------------------------------  --------------  SCRIPT ANSWERS  Relationship to Patient?  Self

## 2020-08-31 ENCOUNTER — TELEPHONE (OUTPATIENT)
Dept: PRIMARY CARE CLINIC | Age: 70
End: 2020-08-31

## 2020-08-31 RX ORDER — TIZANIDINE 2 MG/1
TABLET ORAL
Qty: 30 TABLET | Refills: 1 | Status: SHIPPED | OUTPATIENT
Start: 2020-08-31

## 2020-08-31 NOTE — TELEPHONE ENCOUNTER
----- Message from Cloretta Dubin sent at 8/28/2020  1:21 PM EDT -----  Subject: Appointment Request    Reason for Call: Urgent Back Neck Pain    QUESTIONS  Type of Appointment? Established Patient  Reason for appointment request? No appointments available during search  Additional Information for Provider? Pt had fallen in March and broke 4   ribs on right side. She is currently having lower back pain and would like   to have a referral for x-ray of her back. ---------------------------------------------------------------------------  --------------  Alma Bradshaw INFO  What is the best way for the office to contact you? OK to leave message on   voicemail  Preferred Call Back Phone Number? [phone]  ---------------------------------------------------------------------------  --------------  SCRIPT ANSWERS  Relationship to Patient? Self  Appointment reason? Symptomatic  Select script based on patient symptoms? Adult Back or Neck Pain [Slipped   disc   Herniated disc   sciatica]  Did you have an injury or trauma within the past 3 days? No  Are you having numbness   tingling   or weakness in your arms and/or legs with this pain? No  Are you having new problems with your bowel or bladder control? No  Are you having fevers (100.4)   chills   or sweats? No  Did your pain begin within the past 14 days? No  Is your pain affecting your daily activities or employment? Yes  Have you been diagnosed with   tested for   or told that you are suspected of having COVID-19 (Coronavirus)? No  Have you had a fever or taken medication to treat a fever within the past   3 days? No  Have you had a cough   shortness of breath or flu-like symptoms within the past 3 days? No  Do you currently have flu-like symptoms including fever or chills   cough   shortness of breath   or difficulty breathing   or new loss of taste or smell? No  (Service Expert  click yes below to proceed with [employer] As Usual   Scheduling)?  Yes

## 2020-09-01 ENCOUNTER — TELEPHONE (OUTPATIENT)
Dept: PRIMARY CARE CLINIC | Age: 70
End: 2020-09-01

## 2020-09-01 RX ORDER — INSULIN ASPART 100 [IU]/ML
INJECTION, SOLUTION INTRAVENOUS; SUBCUTANEOUS
Qty: 5 PEN | Refills: 3 | Status: SHIPPED | OUTPATIENT
Start: 2020-09-01 | End: 2021-02-22

## 2020-09-01 NOTE — TELEPHONE ENCOUNTER
Left message on voicemail ext [phone] for srikanth Anaya to use Humulin R in place of novolog    Notify pt

## 2020-09-01 NOTE — TELEPHONE ENCOUNTER
----- Message from Yared Fisher sent at 9/1/2020 11:55 AM EDT -----  Subject: Message to Provider    QUESTIONS  Information for Provider? Héctor from [facility] returning Dr. Nora Mckeon call regarding a change on pt insulin. They are out of stock of   the Novalog   but they do have Humilin-R available. His extension is [phone]   phone number is [phone]  ---------------------------------------------------------------------------  --------------  [address]  What is the best way for the office to contact you? OK to leave message on   voicemail  Preferred Call Back Phone Number? [phone]  ---------------------------------------------------------------------------  --------------  SCRIPT ANSWERS  Relationship to Patient? Other  Representative Name? [facility]  Is the Representative on the appropriate HIPAA document in Epic?  Yes Urine test x2 are positive- made appt for this afternoon for blood test  Future Appointments  Date Time Provider Dieter Holland   12/8/2017 2:00 PM  [facility] [address] [facility]

## 2020-09-17 ENCOUNTER — TELEPHONE (OUTPATIENT)
Dept: PRIMARY CARE CLINIC | Age: 70
End: 2020-09-17

## 2020-09-18 ENCOUNTER — TELEPHONE (OUTPATIENT)
Dept: PRIMARY CARE CLINIC | Age: 70
End: 2020-09-18

## 2020-09-18 NOTE — TELEPHONE ENCOUNTER
Spoke with patient  Apparently [facility] does not have any free Novolog pens available at this time.     We will need to contact them  To see what free insulin they have to substitute for the novolog  I will can pharmacy and leave message with them

## 2020-09-21 RX ORDER — BLOOD SUGAR DIAGNOSTIC
STRIP MISCELLANEOUS
Qty: 100 STRIP | Refills: 10 | Status: SHIPPED | OUTPATIENT
Start: 2020-09-21

## 2020-09-21 RX ORDER — AMLODIPINE BESYLATE 10 MG/1
TABLET ORAL
Qty: 30 TABLET | Refills: 5 | Status: SHIPPED | OUTPATIENT
Start: 2020-09-21 | End: 2020-12-04 | Stop reason: SDUPTHER

## 2020-09-21 RX ORDER — MONTELUKAST SODIUM 10 MG/1
10 TABLET ORAL DAILY
Qty: 30 TABLET | Refills: 10 | Status: SHIPPED | OUTPATIENT
Start: 2020-09-21 | End: 2020-12-04

## 2020-09-21 RX ORDER — INSULIN GLARGINE 100 [IU]/ML
INJECTION, SOLUTION SUBCUTANEOUS
Qty: 9 ML | Refills: 10 | Status: SHIPPED | OUTPATIENT
Start: 2020-09-21 | End: 2021-02-22

## 2020-09-21 RX ORDER — ALENDRONATE SODIUM 70 MG/1
TABLET ORAL
Qty: 4 TABLET | Refills: 2 | Status: SHIPPED | OUTPATIENT
Start: 2020-09-21 | End: 2020-10-07

## 2020-09-21 RX ORDER — FAMOTIDINE 20 MG/1
TABLET, FILM COATED ORAL
Qty: 60 TABLET | Refills: 10 | Status: SHIPPED | OUTPATIENT
Start: 2020-09-21 | End: 2020-11-04 | Stop reason: RX

## 2020-09-21 NOTE — TELEPHONE ENCOUNTER
Pt was on Novolog, but [facility] is out of it.    Can they switch to Humulin-R?     ALSO NEEDS REFILLS ON:  Amlodipine  10 mg  Famotidine 40 mg  Metoprolol 5 mg   Loratadine 10 mg  Atorvastatin 40 mg  Metformin 1000 mg  Sertraline 50 mg  Test strips  Basilar   HCTZ  Alendronate  Montelukast      PHARM:  [facility]

## 2020-10-14 ENCOUNTER — TELEPHONE (OUTPATIENT)
Dept: PRIMARY CARE CLINIC | Age: 70
End: 2020-10-14

## 2020-10-14 NOTE — TELEPHONE ENCOUNTER
----- Message from Mariela Sepulveda sent at 10/13/2020 11:57 AM EDT -----  Subject: Message to Provider    QUESTIONS  Information for Provider? Patient needs the doctor to call [facility] she states that she needs Nodulade 70mg for her bones. ---------------------------------------------------------------------------  --------------  Enrique SAPP  What is the best way for the office to contact you? OK to leave message on   voicemail  Preferred Call Back Phone Number? [phone]  ---------------------------------------------------------------------------  --------------  SCRIPT ANSWERS  Relationship to Patient?  Self

## 2020-10-28 ENCOUNTER — TELEPHONE (OUTPATIENT)
Dept: SURGERY | Age: 70
End: 2020-10-28

## 2020-10-28 NOTE — TELEPHONE ENCOUNTER
Patient would like to be called to discuss last colonoscopy with Dr. Renetta Min.   Please call pt at [phone]

## 2020-10-28 NOTE — TELEPHONE ENCOUNTER
Called Dr. Pena [facility] office to request they contact her to set up a colonoscopy to check mass size. She states no one has called her.   She is holding off on surgery due to covid

## 2020-10-30 ENCOUNTER — TELEPHONE (OUTPATIENT)
Dept: SURGERY | Age: 70
End: 2020-10-30

## 2020-10-30 ENCOUNTER — CLINICAL DOCUMENTATION (OUTPATIENT)
Dept: SURGERY | Age: 70
End: 2020-10-30

## 2020-11-02 NOTE — TELEPHONE ENCOUNTER
Medication:   Requested Prescriptions     Pending Prescriptions Disp Refills    alendronate (FOSAMAX) 70 MG tablet [Pharmacy Med Name: ALENDRONATE 70MG T(R)] 4 tablet 0     Sig: TAKE 1 TAB (70MG) BY MOUTH PER WK BEFORE BREAKFAST EMPTY STOMACH SIT U P 30MIN (BONES)        Last Filled:      Patient Phone Number: [phone] (home) [phone] (work)    Last appt: 6/26/2020   Next appt: Visit date not found    Last OARRS: No flowsheet data found.

## 2020-11-03 RX ORDER — ALENDRONATE SODIUM 70 MG/1
TABLET ORAL
Qty: 4 TABLET | Refills: 3 | Status: SHIPPED | OUTPATIENT
Start: 2020-11-03 | End: 2020-12-04 | Stop reason: SDUPTHER

## 2020-11-04 RX ORDER — TIOTROPIUM BROMIDE AND OLODATEROL 3.124; 2.736 UG/1; UG/1
SPRAY, METERED RESPIRATORY (INHALATION)
Qty: 1 INHALER | Refills: 11 | Status: SHIPPED | OUTPATIENT
Start: 2020-11-04

## 2020-11-16 ENCOUNTER — TELEPHONE (OUTPATIENT)
Dept: PRIMARY CARE CLINIC | Age: 70
End: 2020-11-16

## 2020-11-18 RX ORDER — EXENATIDE 2 MG/.65ML
2 INJECTION, SUSPENSION, EXTENDED RELEASE SUBCUTANEOUS
Qty: 4 PEN | Refills: 5 | Status: SHIPPED | OUTPATIENT
Start: 2020-11-18 | End: 2020-12-04 | Stop reason: SDUPTHER

## 2020-11-24 ENCOUNTER — TELEPHONE (OUTPATIENT)
Dept: SURGERY | Age: 70
End: 2020-11-24

## 2020-11-24 NOTE — TELEPHONE ENCOUNTER
I spoke to [facility] after Dr. Kavon Valencia reviewed her recent scope report. She is ok to wait for surgery  - it is up to her. She is concerned with covid and will call back when the numbers are decreasing and she feels safe.

## 2020-12-01 ENCOUNTER — TELEPHONE (OUTPATIENT)
Dept: PRIMARY CARE CLINIC | Age: 70
End: 2020-12-01

## 2020-12-01 NOTE — TELEPHONE ENCOUNTER
Pt states Bydureon went to wrong pharmacy. It should go to [facility]. Pt also needs refill of HCTZ, Atorvastatin 40 mg. Sertraline,  Metoprolol, amlodipine,  Alendronate, Humulin N, and test strips.       PATIENT:  [phone]    PHARMACY:  [facility]

## 2020-12-01 NOTE — TELEPHONE ENCOUNTER
Medication:   Requested Prescriptions     Pending Prescriptions Disp Refills    montelukast (SINGULAIR) 10 MG tablet [Pharmacy Med Name: MONTELUKAST 10MG T(D)] 30 tablet 0     Sig: TAKE 1 TABLET (10MG) BY MOUTH EVERY DAY        Last Filled:      Patient Phone Number: [phone] (home) [phone] (work)    Last appt: 6/26/2020   Next appt: 12/11/2020    Last OARRS: No flowsheet data found.

## 2020-12-02 NOTE — TELEPHONE ENCOUNTER
Left message with pharmacy for pharmacist to call me to discuss change in insulin treatment for this pt, also spoke with pt

## 2020-12-04 ENCOUNTER — TELEPHONE (OUTPATIENT)
Dept: PRIMARY CARE CLINIC | Age: 70
End: 2020-12-04

## 2020-12-04 RX ORDER — MONTELUKAST SODIUM 10 MG/1
TABLET ORAL
Qty: 30 TABLET | Refills: 3 | Status: SHIPPED | OUTPATIENT
Start: 2020-12-04 | End: 2021-04-06 | Stop reason: SDUPTHER

## 2020-12-04 RX ORDER — ATORVASTATIN CALCIUM 40 MG/1
TABLET, FILM COATED ORAL
Qty: 30 TABLET | Refills: 10 | Status: SHIPPED | OUTPATIENT
Start: 2020-12-04

## 2020-12-04 RX ORDER — ALENDRONATE SODIUM 70 MG/1
TABLET ORAL
Qty: 4 TABLET | Refills: 3 | Status: SHIPPED | OUTPATIENT
Start: 2020-12-04

## 2020-12-04 RX ORDER — AMLODIPINE BESYLATE 10 MG/1
TABLET ORAL
Qty: 30 TABLET | Refills: 5 | Status: SHIPPED | OUTPATIENT
Start: 2020-12-04

## 2020-12-04 RX ORDER — EXENATIDE 2 MG/.65ML
2 INJECTION, SUSPENSION, EXTENDED RELEASE SUBCUTANEOUS
Qty: 4 PEN | Refills: 5 | Status: SHIPPED | OUTPATIENT
Start: 2020-12-04

## 2020-12-04 NOTE — TELEPHONE ENCOUNTER
[facility] does not have lantus at this time. They do have NPH insulin. Would you want to use that? Please call pharmacy to discuss.        PHONE:   [phone] extension [phone]

## 2020-12-04 NOTE — TELEPHONE ENCOUNTER
Not recevied  Call back from [address]  To discuss what type of insulin available and  Conversion from lantus.  Not able to leave voicemail  There today also    Asked pt to call to discuss this issue      On novolog per [employer] with meals    Lantus 26       Being switched to Humulin R from novolog    Being switched to Humulin L  13 units twice a day

## 2020-12-15 ENCOUNTER — TELEPHONE (OUTPATIENT)
Dept: PRIMARY CARE CLINIC | Age: 70
End: 2020-12-15

## 2020-12-16 RX ORDER — TIOTROPIUM BROMIDE AND OLODATEROL 3.124; 2.736 UG/1; UG/1
SPRAY, METERED RESPIRATORY (INHALATION)
Qty: 4 G | Refills: 10 | Status: SHIPPED | OUTPATIENT
Start: 2020-12-16 | End: 2021-02-22

## 2020-12-16 RX ORDER — DULAGLUTIDE 0.75 MG/.5ML
0.75 INJECTION, SOLUTION SUBCUTANEOUS WEEKLY
Qty: 4 PEN | Refills: 10 | Status: SHIPPED | OUTPATIENT
Start: 2020-12-16 | End: 2021-02-22

## 2020-12-28 ENCOUNTER — TELEPHONE (OUTPATIENT)
Dept: PRIMARY CARE CLINIC | Age: 70
End: 2020-12-28

## 2020-12-28 DIAGNOSIS — R53.83 FATIGUE, UNSPECIFIED TYPE: ICD-10-CM

## 2020-12-28 RX ORDER — FLUTICASONE PROPIONATE 50 MCG
1 SPRAY, SUSPENSION (ML) NASAL DAILY
Qty: 1 BOTTLE | Refills: 5 | Status: SHIPPED | OUTPATIENT
Start: 2020-12-28 | End: 2021-12-28

## 2020-12-28 RX ORDER — AZITHROMYCIN 250 MG/1
TABLET, FILM COATED ORAL
Qty: 1 PACKET | Refills: 0 | Status: SHIPPED | OUTPATIENT
Start: 2020-12-28 | End: 2021-02-22

## 2020-12-29 RX ORDER — ERGOCALCIFEROL 1.25 MG/1
CAPSULE ORAL
Qty: 1 CAPSULE | Refills: 0 | Status: SHIPPED | OUTPATIENT
Start: 2020-12-29

## 2021-01-01 RX ORDER — MULTIVIT-MIN/IRON FUM/FOLIC AC 7.5 MG-4
1 TABLET ORAL DAILY
Qty: 30 TABLET | Refills: 10 | Status: SHIPPED | OUTPATIENT
Start: 2021-01-01 | End: 2021-03-22 | Stop reason: SDUPTHER

## 2021-01-01 RX ORDER — HYDROCHLOROTHIAZIDE 25 MG/1
TABLET ORAL
Qty: 30 TABLET | Refills: 10 | Status: SHIPPED | OUTPATIENT
Start: 2021-01-01

## 2021-01-01 RX ORDER — FAMOTIDINE 20 MG/1
TABLET, FILM COATED ORAL
Qty: 60 TABLET | Refills: 10 | Status: SHIPPED | OUTPATIENT
Start: 2021-01-01

## 2021-01-12 ENCOUNTER — TELEPHONE (OUTPATIENT)
Dept: PRIMARY CARE CLINIC | Age: 71
End: 2021-01-12

## 2021-01-12 NOTE — TELEPHONE ENCOUNTER
[facility] said they faxed us a letter needing doctor's signature, ICD-10 Code. They faxed this on the 8th. They will refax today.

## 2021-01-26 RX ORDER — INSULIN LISPRO 100 [IU]/ML
INJECTION, SOLUTION INTRAVENOUS; SUBCUTANEOUS
Qty: 1 PEN | Refills: 5 | Status: SHIPPED | OUTPATIENT
Start: 2021-01-26 | End: 2021-02-22

## 2021-01-27 ENCOUNTER — TELEPHONE (OUTPATIENT)
Dept: PRIMARY CARE CLINIC | Age: 71
End: 2021-01-27

## 2021-01-27 DIAGNOSIS — J01.90 ACUTE SINUSITIS, RECURRENCE NOT SPECIFIED, UNSPECIFIED LOCATION: Primary | ICD-10-CM

## 2021-01-27 RX ORDER — AZITHROMYCIN 250 MG/1
TABLET, FILM COATED ORAL
Qty: 1 PACKET | Refills: 0 | Status: SHIPPED | OUTPATIENT
Start: 2021-01-27 | End: 2021-02-22

## 2021-01-27 NOTE — TELEPHONE ENCOUNTER
Not feeling well, sinus infection is back. Head and forehead are hurting . A lot of pressure. . head congestion. Would like a z-pac    Allergies.  See list.    [phone]

## 2021-02-09 ENCOUNTER — TELEPHONE (OUTPATIENT)
Dept: SURGERY | Age: 71
End: 2021-02-09

## 2021-02-09 NOTE — TELEPHONE ENCOUNTER
Pt is comfortable re-scheduling surgery  It was cancelled last April due to Covid    Please call pt: [phone]

## 2021-02-10 NOTE — TELEPHONE ENCOUNTER
Pt said she needs handles that sits around the toilet seat to keep her from falling. NOTE:  She is scheduled for a pre op next Monday. Can this be handeled then?     PHONE:  [phone]

## 2021-02-11 ENCOUNTER — TELEPHONE (OUTPATIENT)
Dept: SURGERY | Age: 71
End: 2021-02-11

## 2021-02-11 NOTE — PROGRESS NOTES
hospitalization, the order may or may not be in effect during this procedure. I give my doctor permission to give me blood or blood products. I understand that there are risks with receiving blood such as hepatitis, AIDS, fever, or allergic reaction. I acknowledge that the risks, benefits, and alternatives of this treatment have been explained to me and that no express or implied warranty has been given by the hospital, any blood bank, or any person or entity as to the blood or blood components transfused. At the discretion of my doctor, I agree to allow observers, equipment/product representatives and allow photographing, and/or televising of the procedure, provided my name or identity is maintained confidentially. I agree the hospital may dispose of or use for scientific or educational purposes any tissue, fluid, or body parts which may be removed.     ________________________________Date________Time______ am/pm  ([facility])  Patient or Signature of Closest Relative or Legal Guardian    ________________________________Date________Time______am/pm      Page 1 of  1  Witness

## 2021-02-11 NOTE — TELEPHONE ENCOUNTER
Pt has surgery scheduled for 02/26/2021 arrival time 5:15 am.  Pt needs a later time due to transportation. Please call.
No

## 2021-02-20 ENCOUNTER — OFFICE VISIT (OUTPATIENT)
Dept: PRIMARY CARE CLINIC | Age: 71
End: 2021-02-20
Payer: MEDICARE

## 2021-02-20 DIAGNOSIS — Z01.818 PREOP EXAMINATION: Primary | ICD-10-CM

## 2021-02-20 PROCEDURE — 99211 OFF/OP EST MAY X REQ PHY/QHP: CPT | Performed by: NURSE PRACTITIONER

## 2021-02-20 PROCEDURE — G8428 CUR MEDS NOT DOCUMENT: HCPCS | Performed by: NURSE PRACTITIONER

## 2021-02-20 PROCEDURE — G8417 CALC BMI ABV UP PARAM F/U: HCPCS | Performed by: NURSE PRACTITIONER

## 2021-02-21 LAB — SARS-COV-2: NOT DETECTED

## 2021-02-22 ENCOUNTER — OFFICE VISIT (OUTPATIENT)
Dept: PRIMARY CARE CLINIC | Age: 71
End: 2021-02-22
Payer: MEDICARE

## 2021-02-22 VITALS
WEIGHT: 222 LBS | TEMPERATURE: 96.9 F | HEIGHT: 60 IN | SYSTOLIC BLOOD PRESSURE: 139 MMHG | DIASTOLIC BLOOD PRESSURE: 74 MMHG | HEART RATE: 80 BPM | BODY MASS INDEX: 43.59 KG/M2

## 2021-02-22 DIAGNOSIS — I10 ESSENTIAL HYPERTENSION: ICD-10-CM

## 2021-02-22 DIAGNOSIS — E11.9 TYPE 2 DIABETES MELLITUS WITHOUT COMPLICATION, WITH LONG-TERM CURRENT USE OF INSULIN (HCC): ICD-10-CM

## 2021-02-22 DIAGNOSIS — C18.9 MALIGNANT NEOPLASM OF COLON, UNSPECIFIED PART OF COLON (HCC): ICD-10-CM

## 2021-02-22 DIAGNOSIS — Z01.818 PRE-OP EXAMINATION: Primary | ICD-10-CM

## 2021-02-22 DIAGNOSIS — Z01.818 PRE-OP EXAM: ICD-10-CM

## 2021-02-22 DIAGNOSIS — Z79.4 TYPE 2 DIABETES MELLITUS WITHOUT COMPLICATION, WITH LONG-TERM CURRENT USE OF INSULIN (HCC): ICD-10-CM

## 2021-02-22 LAB
ALBUMIN SERPL-MCNC: 4.6 G/DL (ref 3.4–5)
ANION GAP SERPL CALCULATED.3IONS-SCNC: 18 MMOL/L (ref 3–16)
BASOPHILS ABSOLUTE: 0.1 K/UL (ref 0–0.2)
BASOPHILS RELATIVE PERCENT: 0.7 %
BUN BLDV-MCNC: 15 MG/DL (ref 7–20)
CALCIUM SERPL-MCNC: 10.1 MG/DL (ref 8.3–10.6)
CHLORIDE BLD-SCNC: 98 MMOL/L (ref 99–110)
CO2: 26 MMOL/L (ref 21–32)
CREAT SERPL-MCNC: 0.9 MG/DL (ref 0.6–1.2)
EOSINOPHILS ABSOLUTE: 0.2 K/UL (ref 0–0.6)
EOSINOPHILS RELATIVE PERCENT: 2.8 %
GFR AFRICAN AMERICAN: >60
GFR NON-AFRICAN AMERICAN: >60
GLUCOSE BLD-MCNC: 67 MG/DL (ref 70–99)
HCT VFR BLD CALC: 38.5 % (ref 36–48)
HEMOGLOBIN: 12.5 G/DL (ref 12–16)
LYMPHOCYTES ABSOLUTE: 3.6 K/UL (ref 1–5.1)
LYMPHOCYTES RELATIVE PERCENT: 43 %
MCH RBC QN AUTO: 27.2 PG (ref 26–34)
MCHC RBC AUTO-ENTMCNC: 32.4 G/DL (ref 31–36)
MCV RBC AUTO: 83.9 FL (ref 80–100)
MONOCYTES ABSOLUTE: 0.6 K/UL (ref 0–1.3)
MONOCYTES RELATIVE PERCENT: 7.2 %
NEUTROPHILS ABSOLUTE: 3.9 K/UL (ref 1.7–7.7)
NEUTROPHILS RELATIVE PERCENT: 46.3 %
PDW BLD-RTO: 14.6 % (ref 12.4–15.4)
PHOSPHORUS: 3.2 MG/DL (ref 2.5–4.9)
PLATELET # BLD: 411 K/UL (ref 135–450)
PMV BLD AUTO: 7.9 FL (ref 5–10.5)
POTASSIUM SERPL-SCNC: 3.9 MMOL/L (ref 3.5–5.1)
RBC # BLD: 4.58 M/UL (ref 4–5.2)
SODIUM BLD-SCNC: 142 MMOL/L (ref 136–145)
WBC # BLD: 8.4 K/UL (ref 4–11)

## 2021-02-22 PROCEDURE — 1123F ACP DISCUSS/DSCN MKR DOCD: CPT | Performed by: NURSE PRACTITIONER

## 2021-02-22 PROCEDURE — 3017F COLORECTAL CA SCREEN DOC REV: CPT | Performed by: NURSE PRACTITIONER

## 2021-02-22 PROCEDURE — 3046F HEMOGLOBIN A1C LEVEL >9.0%: CPT | Performed by: NURSE PRACTITIONER

## 2021-02-22 PROCEDURE — 2022F DILAT RTA XM EVC RTNOPTHY: CPT | Performed by: NURSE PRACTITIONER

## 2021-02-22 PROCEDURE — 93000 ELECTROCARDIOGRAM COMPLETE: CPT | Performed by: NURSE PRACTITIONER

## 2021-02-22 PROCEDURE — 1036F TOBACCO NON-USER: CPT | Performed by: NURSE PRACTITIONER

## 2021-02-22 PROCEDURE — G8417 CALC BMI ABV UP PARAM F/U: HCPCS | Performed by: NURSE PRACTITIONER

## 2021-02-22 PROCEDURE — 99214 OFFICE O/P EST MOD 30 MIN: CPT | Performed by: NURSE PRACTITIONER

## 2021-02-22 PROCEDURE — 4040F PNEUMOC VAC/ADMIN/RCVD: CPT | Performed by: NURSE PRACTITIONER

## 2021-02-22 PROCEDURE — G8427 DOCREV CUR MEDS BY ELIG CLIN: HCPCS | Performed by: NURSE PRACTITIONER

## 2021-02-22 PROCEDURE — G8484 FLU IMMUNIZE NO ADMIN: HCPCS | Performed by: NURSE PRACTITIONER

## 2021-02-22 PROCEDURE — G8399 PT W/DXA RESULTS DOCUMENT: HCPCS | Performed by: NURSE PRACTITIONER

## 2021-02-22 PROCEDURE — 1090F PRES/ABSN URINE INCON ASSESS: CPT | Performed by: NURSE PRACTITIONER

## 2021-02-22 NOTE — PROGRESS NOTES
PRE-OP INSTRUCTIONS FOR THE SURGICAL PATIENT YOU ARE UNABLE TO MAKE CONTACT FOR AN INTERVIEW:       All patients having surgery or anesthesia are required to be Covid tested. You will need to quarantined from the time you are tested until your surgery. 1. Follow all instructions provided to you from your surgeons office, including your ARRIVAL TIME. 2. Enter the MAIN entrance located on [address] and report to the desk. 3. Bring your insurance & prescription card and photo ID with you. You may also be asked to pay a co-pay, as you may want to bring a check or credit card with you. 4. Leave all other valuables at home. 5. Arrange for someone to drive you home and be with you for the first 24 hours after discharge. 6. Bring your medication list with you day of surgery with doses and frequency listed (including over the counter medications)  7. You must contact your surgeon for Instructions regarding:              - ALL medication instructions, especially if taking blood thinners, aspirin, or diabetic medication.  - IF  there is a change in your physical condition such as a cold, fever, rash, cuts, sores or any other infection, especially near your surgical site. 8. A Pre-op History and Physical for surgery MUST be completed by your Physician or an Urgent Care within 30 days of your procedure date. Please bring a copy with you on the day of your procedure and along with any other testing performed. 9. DO NOT EAT ANYTHING eight hours prior to arrival for surgery. May have up to 8 ounces of water 4 hours prior to arrival for surgery. NOTE: ALL Gastric, Bariatric and Bowel surgery patients MUST follow their surgeon's instructions. 10. No gum, candy, mints, or ice chips day of procedure. 11. Please refrain from drinking alcohol the day before or day of your procedure. 12. Please do not smoke the day of your procedure.     13. Dress in loose, comfortable clothing appropriate for redressing after your procedure. Do not wear jewelry (including body piercings), make-up, fingernail polish, lotion, powders or metal hairclips. 15. Contacts will need to be removed prior to surgery. You may want to bring your eye glasses to wear immediately before and after surgery. 14. Dentures will need to be removed before your procedure. 13. Bring cases for your glasses, contacts, dentures, or hearing aids to protect them while you are in surgery. 16. If you use a CPAP, please bring it with you on the day of your procedure. 17. Do not shave or wax for 72 hours prior to procedure near your operative site  18. FOR WOMAN OF CHILDBEARING AGE ONLY- please bring a urine sample with you on day of surgery or make sure we can collect on arrival.     If you have further questions, you may contact your surgeon's office or us at [phone]     Left instructions on patient's voicemail. Erven Risk. 2/22/2021 .2:35 PM

## 2021-02-22 NOTE — PATIENT INSTRUCTIONS
May take all of your medications the day before surgery, do not take any of your medications day of surgery.

## 2021-02-22 NOTE — PROGRESS NOTES
Preoperative Consultation      Stacey Griffiths  YOB: 1950    Date of Service:  2/22/2021    Vitals:    02/22/21 1308   BP: 139/74   Pulse: 80   Temp: 96.9 °F (36.1 °C)   TempSrc: Temporal   Weight: 222 lb (100.7 kg)   Height: 5' (1.524 m)      Wt Readings from Last 2 Encounters:   02/22/21 222 lb (100.7 kg)   06/26/20 206 lb (93.4 kg)     BP Readings from Last 3 Encounters:   02/22/21 139/74   06/26/20 139/81   04/17/20 134/84        Chief Complaint   Patient presents with    Pre-op Exam     ROBOTIC VERSUS LAPAROSCOPIC LEFT COLECTOMY, POSSIBLE OPEN, POSSIBLE OSTOMY WITH DR. STANLEY ORTIZ AT [facility]     Allergies   Allergen Reactions    Pantoprazole Sodium Hives    Bactrim [Sulfamethoxazole-Trimethoprim]      ITCHING HIVES, NO RESPIRATORY SXS    Enalapril Swelling     angioedema    Lisinopril Swelling     Lip swelling/angioedema    Milk-Related Compounds Other (See Comments)     Caused GI bleeding (cow's milk)    Pcn [Penicillins] Hives    Tape [Adhesive Tape] Other (See Comments)     Paper tape causes redness, irritation     Outpatient Medications Marked as Taking for the 2/22/21 encounter (Office Visit) with DAVID Vasquez   Medication Sig Dispense Refill    insulin NPH (HUMULIN N) 100 UNIT/ML injection vial Inject 13 units twice a day under skin 2 vial 10    famotidine (PEPCID) 20 MG tablet One a day 60 tablet 10    hydroCHLOROthiazide (HYDRODIURIL) 25 MG tablet TAKE 1 TABLET (25MG) BY MOUTH EVERY DAY (DIURECTIC/ BLOOD PRESSURE) 30 tablet 10    Multiple Vitamins-Minerals (MULTIVITAMIN WITH MINERALS) tablet Take 1 tablet by mouth daily 30 tablet 10    vitamin D (ERGOCALCIFEROL) 1.25 MG (44536 UT) CAPS capsule TAKE 1 CAPSULE BY MOUTH EVERY MONTH 1 capsule 0    fluticasone (FLONASE) 50 MCG/ACT nasal spray 1 spray by Nasal route daily 1 Bottle 5    Dulaglutide (TRULICITY) 1.74 EA/7.5CC SOPN Inject 0.75 mg into the skin once a week 4 pen 10    montelukast (SINGULAIR) 10 MG tablet TAKE 1 TABLET (10MG) BY MOUTH EVERY DAY 30 tablet 3    Exenatide (BYDUREON) 2 MG PEN Inject 1 pen into the skin every 7 days 4 pen 5    atorvastatin (LIPITOR) 40 MG tablet TAKE 1 TABLET (40MG) BY MOUTH EVERY DAY (CHOLESTEROL) 30 tablet 10    alendronate (FOSAMAX) 70 MG tablet TAKE 1 TAB (70MG) BY MOUTH PER WK BEFORE BREAKFAST EMPTY STOMACH SIT U P 30MIN (BONES) 4 tablet 3    sertraline (ZOLOFT) 50 MG tablet TAKE 2 TABLETS (100MG) BY MOUTH 2 TIMES A DAY (MOOD) 120 tablet 5    metoprolol tartrate (LOPRESSOR) 25 MG tablet TAKE 1 TABLET (25MG) BY MOUTH 2 TIMES A DAY (BLOOD PRESSURE) 60 tablet 5    amLODIPine (NORVASC) 10 MG tablet TAKE 1 TABLET BY MOUTH EVERY DAY 30 tablet 5    Tiotropium Bromide-Olodaterol (STIOLTO RESPIMAT) 2.5-2.5 MCG/ACT AERS 2 ouffs once a day 1 Inhaler 11    metFORMIN (GLUCOPHAGE) 1000 MG tablet TAKE 1 TABLET (1000MG) BY MOUTH 2 TIMES A DAY WITH MEALS (BLOOD SUGAR/DIABETES) 60 tablet 12    insulin regular (HUMULIN R) 100 UNIT/ML injection Inject 4 Units into the skin See Admin Instructions Before meals for blood sugars  > 150 10 mL 5    insulin glargine (BASAGLAR KWIKPEN) 100 UNIT/ML injection pen INJECT 25 UNITS UNDER SKIN EVERY DAY AT BEDTIME 9 mL 10    insulin aspart (NOVOLOG FLEXPEN) 100 UNIT/ML injection pen Inject 4 units under the skin with meal for BS> 150 5 pen 3    tiZANidine (ZANAFLEX) 2 MG tablet One or two tabs prn bedtime 30 tablet 1    loratadine (ALLERGY RELIEF) 10 MG tablet TAKE 1 TABLET (10MG) BY MOUTH EVERY DAY (ALLERGIES) 30 tablet 12    albuterol sulfate  (90 Base) MCG/ACT inhaler INHALE 2 PUFFS BY MOUTH EVERY 6 HOURS AS NEEDED 1 Inhaler 5    pravastatin (PRAVACHOL) 20 MG tablet Take one tab a day cancel script pravastatin 10 mg 30 tablet 12    Insulin Degludec (TRESIBA) 100 UNIT/ML SOLN Inject 25 units under skin daily 1 vial 10    acetaminophen (TYLENOL) 500 MG tablet Take 1 tablet by mouth 4 times daily as needed for Pain 120 tablet 1    albuterol (PROVENTIL) (2.5 MG/3ML) 0.083% nebulizer solution INHALE CONTENTS OF 1 VIAL (3ML) BY MOUTH VIA NEBULIZER 3 TIMES A  mL 0    Alcohol Swabs PADS Use TID to test blood glucose 200 each 5    beclomethasone (QVAR) 80 MCG/ACT inhaler Inhale 2 puffs into the lungs 2 times daily 1 Inhaler 11    dorzolamide (TRUSOPT) 2 % ophthalmic solution   6    Bimatoprost (LUMIGAN) 0.01 % SOLN Apply  to eye. One drop in each eye at bedtime       brimonidine (ALPHAGAN P) 0.1 % SOLN 1 drop 2 times daily. One drop in both eyes every 12 hours         This patient presents to the office today for a preoperative consultation at the request of surgeon, Dr. Kristine Whitley, who plans on performing ROBOTIC VERSUS LAPAROSCOPIC LEFT COLECTOMY, POSSIBLE OPEN, POSSIBLE OSTOMY on February 26 at [place] [address].  The current problem began 1 year ago. Reports dx with colon cancer 1 year ago. Planned anesthesia: General   Known anesthesia problems: None   Bleeding risk: No recent or remote history of abnormal bleeding  Personal or FH of DVT/PE: No    Patient objection to receiving blood products: No    Surgical history - hysterectomy. Hypertension -current medications are hydrochlorothiazide and metoprolol. Denies chest pain, SOB, headaches. Diabetes -last A1c was 4.6 6/2020. Current medications are NPH insulin 13 units twice a day, sliding scale regular insulin, Metformin.     Patient Active Problem List   Diagnosis    DM type 2 (diabetes mellitus, type 2)    Sinus congestion    Hypertension    Tear of lateral cartilage or meniscus of knee, current    Rectal bleeding    Patellofemoral arthritis    Hyperlipidemia    CKD (chronic kidney disease) stage 3, GFR 30-59 ml/min ([facility])    Moderate persistent asthma without complication    Morbid obesity due to excess calories ([facility])    Asthma    Essential hypertension    Lateral meniscus tear       Past Medical History:   Diagnosis Date    Allergic rhinitis     Asthma     Back pain     GI bleeding 2014    Glaucoma     both eyes    Hypertension     Knee pain     Morbid obesity due to excess calories ([facility] [address] 2015    Osteoporosis     Type II or unspecified type diabetes mellitus without mention of complication, not stated as uncontrolled      Past Surgical History:   Procedure Laterality Date    COLONOSCOPY  2014    polyp removed    COLONOSCOPY  2020    COLONOSCOPY POLYPECTOMY SNARE/COLD BIOPSY performed by Cherrie Brizuela MD at [address] COLONOSCOPY  2020    COLONOSCOPY SUBMUCOSAL/BOTOX INJECTION performed by Cherrie Brizuela MD at [address] Left     knee scoped and \"cleaned out\"     Family History   Problem Relation Age of Onset    Diabetes Mother     Cancer Father      Social History     Socioeconomic History    Marital status:       Spouse name: Not on file    Number of children: Not on file    Years of education: Not on file    Highest education level: Not on file   Occupational History    Not on file   Social Needs    Financial resource strain: Not on file    Food insecurity     Worry: Not on file     Inability: Not on file    Transportation needs     Medical: Not on file     Non-medical: Not on file   Tobacco Use    Smoking status: Former Smoker     Packs/day: 0.50     Years: 20.00     Pack years: 10.00     Types: Cigarettes     Start date: 1984     Quit date: 1993     Years since quittin.4    Smokeless tobacco: Never Used   Substance and Sexual Activity    Alcohol use: No    Drug use: No    Sexual activity: Not on file   Lifestyle    Physical activity     Days per week: Not on file     Minutes per session: Not on file    Stress: Not on file   Relationships    Social connections     Talks on phone: Not on file     Gets together: Not on file     Attends Caodaism service: Not on file     Active member of club or organization: Not on file Attends meetings of clubs or organizations: Not on file     Relationship status: Not on file    Intimate partner violence     Fear of current or ex partner: Not on file     Emotionally abused: Not on file     Physically abused: Not on file     Forced sexual activity: Not on file   Other Topics Concern    Not on file   Social History Narrative    Not on file       Review of Systems  A comprehensive review of systems was negative except for: chronic back and left knee pain. Denies chest pain, SOB, leg swelling, palpitations. Physical Exam   Constitutional: She is oriented to person, place, and time. She appears well-developed and well-nourished. No distress. HENT:   Head: Normocephalic and atraumatic. Eyes: Conjunctivae and EOM are normal. Pupils are equal, round, and reactive to light. Neck: Trachea normal and normal range of motion. Neck supple. No JVD present. Cardiovascular: Normal rate, regular rhythm, normal heart sounds and intact distal pulses. Exam reveals no gallop and no friction rub. No murmur heard. Pulmonary/Chest: Effort normal and breath sounds normal. No respiratory distress. She has no wheezes. She has no rales. Abdominal: Soft. Normal aorta and bowel sounds are normal. She exhibits no distension and no mass. Musculoskeletal: She exhibits no edema and no tenderness. Neurological: She is alert and oriented to person, place, and time. She has normal strength. No cranial nerve deficit or sensory deficit. Coordination and gait normal.   Skin: Skin is warm and dry. No rash noted. No erythema. Psychiatric: She has a normal mood and affect. Her behavior is normal.     EKG Interpretation:  normal EKG, normal sinus rhythm. Lab Review CBC, renal, A1C ordered        Assessment:       65 y.o. patient with planned surgery as above.     Known risk factors for perioperative complications: Asthma, Diabetes mellitus, Hypertension  Current medications which may produce withdrawal symptoms if withheld perioperatively: none      Plan:     1. Preoperative workup as follows: ECG, hemoglobin, hematocrit, electrolytes, creatinine, A1C  2. Change in medication regimen before surgery: Hold all medications on morning of surgery  3. Prophylaxis for cardiac events with perioperative beta-blockers: Currently taking  metoprolol  ACC/AHA indications for pre-operative beta-blocker use:    · Vascular surgery with history of postitive stress test  · Intermediate or high risk surgery with history of CAD   · Intermediate or high risk surgery with multiple clinical predictors of CAD- 2 of the following: history of compensated or prior heart failure, history of cerebrovascular disease, DM, or renal insufficiency    Routine administration of higher-dose, long-acting metoprolol in beta-blocker-naïve patients on the day of surgery, and in the absence of dose titration is associated with an overall increase in mortality. Beta-blockers should be started days to weeks prior to surgery and titrated to pulse < 70.  4. Deep vein thrombosis prophylaxis: regimen to be chosen by surgical team  5. No contraindications to planned surgery pending ordered blood work. Pre-Operative Risk assessment using 2014 ACC/AHA guidelines     Emergent procedure No  Active Cardiac Condition No (decompensated HF, Arrhythmia, MI <3 weeks, severe valve disease)  Risk Level of Procedure Intermediate Risk (intraperitoneal, intrathoracic, HENT, orthopedic, or carotid endarterectomy, etc.)  Revised Cardiac Risk Index Risk factors: Diabetic treated with insulin  Measurement of Exercise Tolerance before Surgery >4 Yes    According to the 2014 ACC/AHA pre-operative risk assessment guidelines Marleen Pastor is a low risk for major cardiac complications during a intermediate risk procedure and may continue as planned. Specific medication recommendations are listed below.  Medications recommended to continue should be taken with a sip of water even when NPO.      Further recommendations from consultants: None

## 2021-02-22 NOTE — PROGRESS NOTES
Discharge planning:  Patient to be admitted day of surgery. Left message on voicemail with pre op instructions which included upon discharge have someone that will be there for  Her recovery.

## 2021-02-23 ENCOUNTER — TELEPHONE (OUTPATIENT)
Dept: PRIMARY CARE CLINIC | Age: 71
End: 2021-02-23

## 2021-02-23 LAB
ESTIMATED AVERAGE GLUCOSE: 145.6 MG/DL
HBA1C MFR BLD: 6.7 %

## 2021-02-25 ENCOUNTER — TELEPHONE (OUTPATIENT)
Dept: SURGERY | Age: 71
End: 2021-02-25

## 2021-02-25 ENCOUNTER — PREP FOR PROCEDURE (OUTPATIENT)
Dept: SURGERY | Age: 71
End: 2021-02-25

## 2021-02-25 ENCOUNTER — ANESTHESIA EVENT (OUTPATIENT)
Dept: OPERATING ROOM | Age: 71
DRG: 330 | End: 2021-02-25
Payer: MEDICARE

## 2021-02-25 RX ORDER — SODIUM CHLORIDE 0.9 % (FLUSH) 0.9 %
10 SYRINGE (ML) INJECTION EVERY 12 HOURS SCHEDULED
Status: CANCELLED | OUTPATIENT
Start: 2021-02-25

## 2021-02-25 RX ORDER — ACETAMINOPHEN 325 MG/1
1000 TABLET ORAL ONCE
Status: CANCELLED | OUTPATIENT
Start: 2021-02-25 | End: 2021-02-25

## 2021-02-25 RX ORDER — SODIUM CHLORIDE 0.9 % (FLUSH) 0.9 %
10 SYRINGE (ML) INJECTION PRN
Status: CANCELLED | OUTPATIENT
Start: 2021-02-25

## 2021-02-26 ENCOUNTER — HOSPITAL ENCOUNTER (INPATIENT)
Age: 71
LOS: 7 days | Discharge: HOME HEALTH | DRG: 330 | End: 2021-03-05
Attending: SURGERY | Admitting: SURGERY
Payer: MEDICARE

## 2021-02-26 ENCOUNTER — ANESTHESIA (OUTPATIENT)
Dept: OPERATING ROOM | Age: 71
DRG: 330 | End: 2021-02-26
Payer: MEDICARE

## 2021-02-26 VITALS — SYSTOLIC BLOOD PRESSURE: 134 MMHG | TEMPERATURE: 98.2 F | OXYGEN SATURATION: 100 % | DIASTOLIC BLOOD PRESSURE: 105 MMHG

## 2021-02-26 DIAGNOSIS — C18.6 CANCER OF DESCENDING COLON (HCC): ICD-10-CM

## 2021-02-26 DIAGNOSIS — G89.18 POST-OP PAIN: Primary | ICD-10-CM

## 2021-02-26 LAB
ABO/RH: NORMAL
ALBUMIN SERPL-MCNC: 3.2 G/DL (ref 3.4–5)
ANION GAP SERPL CALCULATED.3IONS-SCNC: 12 MMOL/L (ref 3–16)
ANION GAP SERPL CALCULATED.3IONS-SCNC: 13 MMOL/L (ref 3–16)
ANTIBODY SCREEN: NORMAL
BASOPHILS ABSOLUTE: 0.1 K/UL (ref 0–0.2)
BASOPHILS RELATIVE PERCENT: 0.3 %
BLOOD BANK DISPENSE STATUS: NORMAL
BLOOD BANK PRODUCT CODE: NORMAL
BPU ID: NORMAL
BUN BLDV-MCNC: 10 MG/DL (ref 7–20)
BUN BLDV-MCNC: 9 MG/DL (ref 7–20)
CALCIUM SERPL-MCNC: 7.8 MG/DL (ref 8.3–10.6)
CALCIUM SERPL-MCNC: 9.5 MG/DL (ref 8.3–10.6)
CHLORIDE BLD-SCNC: 103 MMOL/L (ref 99–110)
CHLORIDE BLD-SCNC: 99 MMOL/L (ref 99–110)
CO2: 21 MMOL/L (ref 21–32)
CO2: 29 MMOL/L (ref 21–32)
CREAT SERPL-MCNC: 0.8 MG/DL (ref 0.6–1.2)
CREAT SERPL-MCNC: 0.9 MG/DL (ref 0.6–1.2)
DESCRIPTION BLOOD BANK: NORMAL
EOSINOPHILS ABSOLUTE: 0 K/UL (ref 0–0.6)
EOSINOPHILS RELATIVE PERCENT: 0 %
GFR AFRICAN AMERICAN: >60
GFR AFRICAN AMERICAN: >60
GFR NON-AFRICAN AMERICAN: >60
GFR NON-AFRICAN AMERICAN: >60
GLUCOSE BLD-MCNC: 161 MG/DL (ref 70–99)
GLUCOSE BLD-MCNC: 193 MG/DL (ref 70–99)
GLUCOSE BLD-MCNC: 241 MG/DL (ref 70–99)
GLUCOSE BLD-MCNC: 258 MG/DL (ref 70–99)
GLUCOSE BLD-MCNC: 288 MG/DL (ref 70–99)
HCT VFR BLD CALC: 31 % (ref 36–48)
HCT VFR BLD CALC: 38.5 % (ref 36–48)
HEMOGLOBIN: 12.5 G/DL (ref 12–16)
HEMOGLOBIN: 9.7 G/DL (ref 12–16)
LACTIC ACID: 4.5 MMOL/L (ref 0.4–2)
LACTIC ACID: 5 MMOL/L (ref 0.4–2)
LYMPHOCYTES ABSOLUTE: 1.6 K/UL (ref 1–5.1)
LYMPHOCYTES RELATIVE PERCENT: 9.2 %
MAGNESIUM: 1 MG/DL (ref 1.8–2.4)
MCH RBC QN AUTO: 27.2 PG (ref 26–34)
MCH RBC QN AUTO: 27.6 PG (ref 26–34)
MCHC RBC AUTO-ENTMCNC: 31.3 G/DL (ref 31–36)
MCHC RBC AUTO-ENTMCNC: 32.4 G/DL (ref 31–36)
MCV RBC AUTO: 85 FL (ref 80–100)
MCV RBC AUTO: 86.9 FL (ref 80–100)
MONOCYTES ABSOLUTE: 0.4 K/UL (ref 0–1.3)
MONOCYTES RELATIVE PERCENT: 2.1 %
NEUTROPHILS ABSOLUTE: 15.6 K/UL (ref 1.7–7.7)
NEUTROPHILS RELATIVE PERCENT: 88.4 %
PDW BLD-RTO: 14.8 % (ref 12.4–15.4)
PDW BLD-RTO: 15.4 % (ref 12.4–15.4)
PERFORMED ON: ABNORMAL
PHOSPHORUS: 4.6 MG/DL (ref 2.5–4.9)
PLATELET # BLD: 216 K/UL (ref 135–450)
PLATELET # BLD: 387 K/UL (ref 135–450)
PMV BLD AUTO: 7.4 FL (ref 5–10.5)
PMV BLD AUTO: 7.6 FL (ref 5–10.5)
POTASSIUM REFLEX MAGNESIUM: 3.6 MMOL/L (ref 3.5–5.1)
POTASSIUM SERPL-SCNC: 3.8 MMOL/L (ref 3.5–5.1)
RBC # BLD: 3.57 M/UL (ref 4–5.2)
RBC # BLD: 4.53 M/UL (ref 4–5.2)
REASON FOR REJECTION: NORMAL
REJECTED TEST: NORMAL
SODIUM BLD-SCNC: 137 MMOL/L (ref 136–145)
SODIUM BLD-SCNC: 140 MMOL/L (ref 136–145)
WBC # BLD: 17.7 K/UL (ref 4–11)
WBC # BLD: 9.4 K/UL (ref 4–11)

## 2021-02-26 PROCEDURE — 6360000002 HC RX W HCPCS: Performed by: STUDENT IN AN ORGANIZED HEALTH CARE EDUCATION/TRAINING PROGRAM

## 2021-02-26 PROCEDURE — S2900 ROBOTIC SURGICAL SYSTEM: HCPCS | Performed by: SURGERY

## 2021-02-26 PROCEDURE — 6370000000 HC RX 637 (ALT 250 FOR IP): Performed by: STUDENT IN AN ORGANIZED HEALTH CARE EDUCATION/TRAINING PROGRAM

## 2021-02-26 PROCEDURE — 2580000003 HC RX 258: Performed by: NURSE ANESTHETIST, CERTIFIED REGISTERED

## 2021-02-26 PROCEDURE — 94669 MECHANICAL CHEST WALL OSCILL: CPT

## 2021-02-26 PROCEDURE — 2580000003 HC RX 258: Performed by: ANESTHESIOLOGY

## 2021-02-26 PROCEDURE — 2709999900 HC NON-CHARGEABLE SUPPLY: Performed by: SURGERY

## 2021-02-26 PROCEDURE — 3600000009 HC SURGERY ROBOT BASE: Performed by: SURGERY

## 2021-02-26 PROCEDURE — C9290 INJ, BUPIVACAINE LIPOSOME: HCPCS | Performed by: ANESTHESIOLOGY

## 2021-02-26 PROCEDURE — 2580000003 HC RX 258: Performed by: SURGERY

## 2021-02-26 PROCEDURE — 94640 AIRWAY INHALATION TREATMENT: CPT

## 2021-02-26 PROCEDURE — 44204 LAPARO PARTIAL COLECTOMY: CPT | Performed by: SURGERY

## 2021-02-26 PROCEDURE — 2580000003 HC RX 258: Performed by: STUDENT IN AN ORGANIZED HEALTH CARE EDUCATION/TRAINING PROGRAM

## 2021-02-26 PROCEDURE — 83605 ASSAY OF LACTIC ACID: CPT

## 2021-02-26 PROCEDURE — 2700000000 HC OXYGEN THERAPY PER DAY

## 2021-02-26 PROCEDURE — 85025 COMPLETE CBC W/AUTO DIFF WBC: CPT

## 2021-02-26 PROCEDURE — 83735 ASSAY OF MAGNESIUM: CPT

## 2021-02-26 PROCEDURE — 2500000003 HC RX 250 WO HCPCS: Performed by: SURGERY

## 2021-02-26 PROCEDURE — 85610 PROTHROMBIN TIME: CPT

## 2021-02-26 PROCEDURE — 64488 TAP BLOCK BI INJECTION: CPT | Performed by: ANESTHESIOLOGY

## 2021-02-26 PROCEDURE — 2500000003 HC RX 250 WO HCPCS: Performed by: NURSE ANESTHETIST, CERTIFIED REGISTERED

## 2021-02-26 PROCEDURE — P9016 RBC LEUKOCYTES REDUCED: HCPCS

## 2021-02-26 PROCEDURE — 44213 LAP MOBIL SPLENIC FL ADD-ON: CPT | Performed by: SURGERY

## 2021-02-26 PROCEDURE — 0DTG4ZZ RESECTION OF LEFT LARGE INTESTINE, PERCUTANEOUS ENDOSCOPIC APPROACH: ICD-10-PCS | Performed by: SURGERY

## 2021-02-26 PROCEDURE — 8E0W4CZ ROBOTIC ASSISTED PROCEDURE OF TRUNK REGION, PERCUTANEOUS ENDOSCOPIC APPROACH: ICD-10-PCS | Performed by: SURGERY

## 2021-02-26 PROCEDURE — 3700000001 HC ADD 15 MINUTES (ANESTHESIA): Performed by: SURGERY

## 2021-02-26 PROCEDURE — 85027 COMPLETE CBC AUTOMATED: CPT

## 2021-02-26 PROCEDURE — P9041 ALBUMIN (HUMAN),5%, 50ML: HCPCS | Performed by: NURSE ANESTHETIST, CERTIFIED REGISTERED

## 2021-02-26 PROCEDURE — 7100000001 HC PACU RECOVERY - ADDTL 15 MIN: Performed by: SURGERY

## 2021-02-26 PROCEDURE — 6360000002 HC RX W HCPCS: Performed by: SURGERY

## 2021-02-26 PROCEDURE — 3E0T3BZ INTRODUCTION OF ANESTHETIC AGENT INTO PERIPHERAL NERVES AND PLEXI, PERCUTANEOUS APPROACH: ICD-10-PCS | Performed by: ANESTHESIOLOGY

## 2021-02-26 PROCEDURE — 6360000002 HC RX W HCPCS: Performed by: NURSE ANESTHETIST, CERTIFIED REGISTERED

## 2021-02-26 PROCEDURE — 94664 DEMO&/EVAL PT USE INHALER: CPT

## 2021-02-26 PROCEDURE — 1200000000 HC SEMI PRIVATE

## 2021-02-26 PROCEDURE — 6370000000 HC RX 637 (ALT 250 FOR IP): Performed by: ANESTHESIOLOGY

## 2021-02-26 PROCEDURE — 94150 VITAL CAPACITY TEST: CPT

## 2021-02-26 PROCEDURE — 7100000000 HC PACU RECOVERY - FIRST 15 MIN: Performed by: SURGERY

## 2021-02-26 PROCEDURE — C2627 CATH, SUPRAPUBIC/CYSTOSCOPIC: HCPCS | Performed by: SURGERY

## 2021-02-26 PROCEDURE — 86901 BLOOD TYPING SEROLOGIC RH(D): CPT

## 2021-02-26 PROCEDURE — 36415 COLL VENOUS BLD VENIPUNCTURE: CPT

## 2021-02-26 PROCEDURE — 88309 TISSUE EXAM BY PATHOLOGIST: CPT

## 2021-02-26 PROCEDURE — 94761 N-INVAS EAR/PLS OXIMETRY MLT: CPT

## 2021-02-26 PROCEDURE — 86923 COMPATIBILITY TEST ELECTRIC: CPT

## 2021-02-26 PROCEDURE — 6360000002 HC RX W HCPCS: Performed by: ANESTHESIOLOGY

## 2021-02-26 PROCEDURE — 3700000000 HC ANESTHESIA ATTENDED CARE: Performed by: SURGERY

## 2021-02-26 PROCEDURE — 2500000003 HC RX 250 WO HCPCS: Performed by: ANESTHESIOLOGY

## 2021-02-26 PROCEDURE — 2720000010 HC SURG SUPPLY STERILE: Performed by: SURGERY

## 2021-02-26 PROCEDURE — 83036 HEMOGLOBIN GLYCOSYLATED A1C: CPT

## 2021-02-26 PROCEDURE — 86850 RBC ANTIBODY SCREEN: CPT

## 2021-02-26 PROCEDURE — 86900 BLOOD TYPING SEROLOGIC ABO: CPT

## 2021-02-26 PROCEDURE — 80069 RENAL FUNCTION PANEL: CPT

## 2021-02-26 PROCEDURE — 3600000019 HC SURGERY ROBOT ADDTL 15MIN: Performed by: SURGERY

## 2021-02-26 RX ORDER — ONDANSETRON 2 MG/ML
INJECTION INTRAMUSCULAR; INTRAVENOUS PRN
Status: DISCONTINUED | OUTPATIENT
Start: 2021-02-26 | End: 2021-02-26 | Stop reason: SDUPTHER

## 2021-02-26 RX ORDER — ONDANSETRON 2 MG/ML
4 INJECTION INTRAMUSCULAR; INTRAVENOUS
Status: DISCONTINUED | OUTPATIENT
Start: 2021-02-26 | End: 2021-02-26 | Stop reason: HOSPADM

## 2021-02-26 RX ORDER — SODIUM CHLORIDE, SODIUM LACTATE, POTASSIUM CHLORIDE, AND CALCIUM CHLORIDE .6; .31; .03; .02 G/100ML; G/100ML; G/100ML; G/100ML
500 INJECTION, SOLUTION INTRAVENOUS ONCE
Status: COMPLETED | OUTPATIENT
Start: 2021-02-26 | End: 2021-02-26

## 2021-02-26 RX ORDER — SODIUM CHLORIDE 0.9 % (FLUSH) 0.9 %
10 SYRINGE (ML) INJECTION PRN
Status: DISCONTINUED | OUTPATIENT
Start: 2021-02-26 | End: 2021-02-26 | Stop reason: HOSPADM

## 2021-02-26 RX ORDER — ROCURONIUM BROMIDE 10 MG/ML
INJECTION, SOLUTION INTRAVENOUS PRN
Status: DISCONTINUED | OUTPATIENT
Start: 2021-02-26 | End: 2021-02-26 | Stop reason: SDUPTHER

## 2021-02-26 RX ORDER — BUPIVACAINE HYDROCHLORIDE 5 MG/ML
INJECTION, SOLUTION EPIDURAL; INTRACAUDAL PRN
Status: DISCONTINUED | OUTPATIENT
Start: 2021-02-26 | End: 2021-02-26 | Stop reason: SDUPTHER

## 2021-02-26 RX ORDER — SODIUM CHLORIDE, SODIUM LACTATE, POTASSIUM CHLORIDE, AND CALCIUM CHLORIDE .6; .31; .03; .02 G/100ML; G/100ML; G/100ML; G/100ML
IRRIGANT IRRIGATION PRN
Status: DISCONTINUED | OUTPATIENT
Start: 2021-02-26 | End: 2021-02-26 | Stop reason: ALTCHOICE

## 2021-02-26 RX ORDER — HYDRALAZINE HYDROCHLORIDE 20 MG/ML
5 INJECTION INTRAMUSCULAR; INTRAVENOUS EVERY 5 MIN PRN
Status: DISCONTINUED | OUTPATIENT
Start: 2021-02-26 | End: 2021-02-26 | Stop reason: HOSPADM

## 2021-02-26 RX ORDER — PROPOFOL 10 MG/ML
INJECTION, EMULSION INTRAVENOUS PRN
Status: DISCONTINUED | OUTPATIENT
Start: 2021-02-26 | End: 2021-02-26 | Stop reason: SDUPTHER

## 2021-02-26 RX ORDER — SODIUM CHLORIDE 9 MG/ML
INJECTION, SOLUTION INTRAVENOUS PRN
Status: DISCONTINUED | OUTPATIENT
Start: 2021-02-26 | End: 2021-03-05 | Stop reason: HOSPADM

## 2021-02-26 RX ORDER — INSULIN LISPRO 100 [IU]/ML
0-12 INJECTION, SOLUTION INTRAVENOUS; SUBCUTANEOUS
Status: DISCONTINUED | OUTPATIENT
Start: 2021-02-27 | End: 2021-02-27

## 2021-02-26 RX ORDER — MAGNESIUM HYDROXIDE 1200 MG/15ML
LIQUID ORAL CONTINUOUS PRN
Status: COMPLETED | OUTPATIENT
Start: 2021-02-26 | End: 2021-02-26

## 2021-02-26 RX ORDER — ALBUMIN, HUMAN INJ 5% 5 %
SOLUTION INTRAVENOUS PRN
Status: DISCONTINUED | OUTPATIENT
Start: 2021-02-26 | End: 2021-02-26 | Stop reason: SDUPTHER

## 2021-02-26 RX ORDER — DEXTROSE MONOHYDRATE 50 MG/ML
100 INJECTION, SOLUTION INTRAVENOUS PRN
Status: DISCONTINUED | OUTPATIENT
Start: 2021-02-26 | End: 2021-03-05 | Stop reason: HOSPADM

## 2021-02-26 RX ORDER — FENTANYL CITRATE 50 UG/ML
100 INJECTION, SOLUTION INTRAMUSCULAR; INTRAVENOUS ONCE
Status: COMPLETED | OUTPATIENT
Start: 2021-02-26 | End: 2021-02-26

## 2021-02-26 RX ORDER — LIDOCAINE HYDROCHLORIDE 20 MG/ML
INJECTION, SOLUTION INTRAVENOUS PRN
Status: DISCONTINUED | OUTPATIENT
Start: 2021-02-26 | End: 2021-02-26 | Stop reason: SDUPTHER

## 2021-02-26 RX ORDER — HYDROMORPHONE HCL 110MG/55ML
PATIENT CONTROLLED ANALGESIA SYRINGE INTRAVENOUS PRN
Status: DISCONTINUED | OUTPATIENT
Start: 2021-02-26 | End: 2021-02-26 | Stop reason: SDUPTHER

## 2021-02-26 RX ORDER — SODIUM CHLORIDE 0.9 % (FLUSH) 0.9 %
10 SYRINGE (ML) INJECTION EVERY 12 HOURS SCHEDULED
Status: DISCONTINUED | OUTPATIENT
Start: 2021-02-26 | End: 2021-02-26 | Stop reason: HOSPADM

## 2021-02-26 RX ORDER — LABETALOL HYDROCHLORIDE 5 MG/ML
5 INJECTION, SOLUTION INTRAVENOUS EVERY 10 MIN PRN
Status: DISCONTINUED | OUTPATIENT
Start: 2021-02-26 | End: 2021-02-26 | Stop reason: HOSPADM

## 2021-02-26 RX ORDER — SODIUM CHLORIDE, SODIUM LACTATE, POTASSIUM CHLORIDE, CALCIUM CHLORIDE 600; 310; 30; 20 MG/100ML; MG/100ML; MG/100ML; MG/100ML
INJECTION, SOLUTION INTRAVENOUS CONTINUOUS PRN
Status: DISCONTINUED | OUTPATIENT
Start: 2021-02-26 | End: 2021-02-26 | Stop reason: SDUPTHER

## 2021-02-26 RX ORDER — ACETAMINOPHEN 500 MG
1000 TABLET ORAL ONCE
Status: DISCONTINUED | OUTPATIENT
Start: 2021-02-26 | End: 2021-02-26 | Stop reason: HOSPADM

## 2021-02-26 RX ORDER — FENTANYL CITRATE 50 UG/ML
50 INJECTION, SOLUTION INTRAMUSCULAR; INTRAVENOUS EVERY 5 MIN PRN
Status: DISCONTINUED | OUTPATIENT
Start: 2021-02-26 | End: 2021-02-26 | Stop reason: HOSPADM

## 2021-02-26 RX ORDER — SODIUM CHLORIDE, SODIUM LACTATE, POTASSIUM CHLORIDE, CALCIUM CHLORIDE 600; 310; 30; 20 MG/100ML; MG/100ML; MG/100ML; MG/100ML
INJECTION, SOLUTION INTRAVENOUS CONTINUOUS
Status: DISCONTINUED | OUTPATIENT
Start: 2021-02-26 | End: 2021-02-27

## 2021-02-26 RX ORDER — ACETAMINOPHEN 500 MG
1000 TABLET ORAL EVERY 6 HOURS
Status: DISCONTINUED | OUTPATIENT
Start: 2021-02-26 | End: 2021-03-05 | Stop reason: HOSPADM

## 2021-02-26 RX ORDER — DEXTROSE MONOHYDRATE 25 G/50ML
12.5 INJECTION, SOLUTION INTRAVENOUS PRN
Status: DISCONTINUED | OUTPATIENT
Start: 2021-02-26 | End: 2021-03-05 | Stop reason: HOSPADM

## 2021-02-26 RX ORDER — NICOTINE POLACRILEX 4 MG
15 LOZENGE BUCCAL PRN
Status: DISCONTINUED | OUTPATIENT
Start: 2021-02-26 | End: 2021-03-05 | Stop reason: HOSPADM

## 2021-02-26 RX ORDER — FENTANYL CITRATE 50 UG/ML
INJECTION, SOLUTION INTRAMUSCULAR; INTRAVENOUS PRN
Status: DISCONTINUED | OUTPATIENT
Start: 2021-02-26 | End: 2021-02-26 | Stop reason: SDUPTHER

## 2021-02-26 RX ORDER — SODIUM CHLORIDE, SODIUM LACTATE, POTASSIUM CHLORIDE, CALCIUM CHLORIDE 600; 310; 30; 20 MG/100ML; MG/100ML; MG/100ML; MG/100ML
INJECTION, SOLUTION INTRAVENOUS CONTINUOUS
Status: DISCONTINUED | OUTPATIENT
Start: 2021-02-26 | End: 2021-02-26

## 2021-02-26 RX ORDER — DEXAMETHASONE SODIUM PHOSPHATE 4 MG/ML
INJECTION, SOLUTION INTRA-ARTICULAR; INTRALESIONAL; INTRAMUSCULAR; INTRAVENOUS; SOFT TISSUE PRN
Status: DISCONTINUED | OUTPATIENT
Start: 2021-02-26 | End: 2021-02-26 | Stop reason: SDUPTHER

## 2021-02-26 RX ORDER — OXYCODONE HYDROCHLORIDE 5 MG/1
5 TABLET ORAL EVERY 6 HOURS PRN
Status: DISCONTINUED | OUTPATIENT
Start: 2021-02-26 | End: 2021-03-05 | Stop reason: HOSPADM

## 2021-02-26 RX ORDER — GLYCOPYRROLATE 1 MG/5 ML
SYRINGE (ML) INTRAVENOUS PRN
Status: DISCONTINUED | OUTPATIENT
Start: 2021-02-26 | End: 2021-02-26 | Stop reason: SDUPTHER

## 2021-02-26 RX ORDER — EPHEDRINE SULFATE 50 MG/ML
INJECTION INTRAVENOUS PRN
Status: DISCONTINUED | OUTPATIENT
Start: 2021-02-26 | End: 2021-02-26 | Stop reason: SDUPTHER

## 2021-02-26 RX ORDER — INSULIN LISPRO 100 [IU]/ML
0-6 INJECTION, SOLUTION INTRAVENOUS; SUBCUTANEOUS NIGHTLY
Status: DISCONTINUED | OUTPATIENT
Start: 2021-02-26 | End: 2021-02-27

## 2021-02-26 RX ORDER — PROPOFOL 10 MG/ML
INJECTION, EMULSION INTRAVENOUS CONTINUOUS PRN
Status: DISCONTINUED | OUTPATIENT
Start: 2021-02-26 | End: 2021-02-26 | Stop reason: SDUPTHER

## 2021-02-26 RX ORDER — ONDANSETRON 4 MG/1
4 TABLET, ORALLY DISINTEGRATING ORAL EVERY 8 HOURS PRN
Status: DISCONTINUED | OUTPATIENT
Start: 2021-02-26 | End: 2021-03-05 | Stop reason: HOSPADM

## 2021-02-26 RX ORDER — SODIUM CHLORIDE, SODIUM LACTATE, POTASSIUM CHLORIDE, AND CALCIUM CHLORIDE .6; .31; .03; .02 G/100ML; G/100ML; G/100ML; G/100ML
500 INJECTION, SOLUTION INTRAVENOUS ONCE
Status: DISCONTINUED | OUTPATIENT
Start: 2021-02-26 | End: 2021-02-26

## 2021-02-26 RX ORDER — SODIUM CHLORIDE 0.9 % (FLUSH) 0.9 %
10 SYRINGE (ML) INJECTION PRN
Status: DISCONTINUED | OUTPATIENT
Start: 2021-02-26 | End: 2021-03-05 | Stop reason: HOSPADM

## 2021-02-26 RX ORDER — FENTANYL CITRATE 50 UG/ML
25 INJECTION, SOLUTION INTRAMUSCULAR; INTRAVENOUS EVERY 5 MIN PRN
Status: DISCONTINUED | OUTPATIENT
Start: 2021-02-26 | End: 2021-02-26 | Stop reason: HOSPADM

## 2021-02-26 RX ORDER — MIDAZOLAM HYDROCHLORIDE 1 MG/ML
2 INJECTION INTRAMUSCULAR; INTRAVENOUS
Status: COMPLETED | OUTPATIENT
Start: 2021-02-26 | End: 2021-02-26

## 2021-02-26 RX ORDER — ONDANSETRON 2 MG/ML
4 INJECTION INTRAMUSCULAR; INTRAVENOUS EVERY 6 HOURS PRN
Status: DISCONTINUED | OUTPATIENT
Start: 2021-02-26 | End: 2021-03-05 | Stop reason: HOSPADM

## 2021-02-26 RX ORDER — ALBUTEROL SULFATE 2.5 MG/3ML
2.5 SOLUTION RESPIRATORY (INHALATION) 3 TIMES DAILY
Status: DISCONTINUED | OUTPATIENT
Start: 2021-02-26 | End: 2021-03-04

## 2021-02-26 RX ORDER — SODIUM CHLORIDE 0.9 % (FLUSH) 0.9 %
10 SYRINGE (ML) INJECTION EVERY 12 HOURS SCHEDULED
Status: DISCONTINUED | OUTPATIENT
Start: 2021-02-26 | End: 2021-03-05 | Stop reason: HOSPADM

## 2021-02-26 RX ORDER — LEVOFLOXACIN 5 MG/ML
500 INJECTION, SOLUTION INTRAVENOUS
Status: COMPLETED | OUTPATIENT
Start: 2021-02-26 | End: 2021-02-26

## 2021-02-26 RX ORDER — METHOCARBAMOL 500 MG/1
500 TABLET, FILM COATED ORAL EVERY 8 HOURS PRN
Status: DISCONTINUED | OUTPATIENT
Start: 2021-02-26 | End: 2021-03-05 | Stop reason: HOSPADM

## 2021-02-26 RX ORDER — LIDOCAINE HYDROCHLORIDE 10 MG/ML
1 INJECTION, SOLUTION EPIDURAL; INFILTRATION; INTRACAUDAL; PERINEURAL
Status: DISCONTINUED | OUTPATIENT
Start: 2021-02-26 | End: 2021-02-26 | Stop reason: HOSPADM

## 2021-02-26 RX ORDER — ALBUTEROL SULFATE 2.5 MG/3ML
2.5 SOLUTION RESPIRATORY (INHALATION) EVERY 6 HOURS PRN
Status: DISCONTINUED | OUTPATIENT
Start: 2021-02-26 | End: 2021-03-05 | Stop reason: HOSPADM

## 2021-02-26 RX ADMIN — PHENYLEPHRINE HYDROCHLORIDE 80 MCG: 10 INJECTION, SOLUTION INTRAMUSCULAR; INTRAVENOUS; SUBCUTANEOUS at 15:29

## 2021-02-26 RX ADMIN — BUPIVACAINE HYDROCHLORIDE 10 ML: 5 INJECTION, SOLUTION EPIDURAL; INTRACAUDAL; PERINEURAL at 10:40

## 2021-02-26 RX ADMIN — ROCURONIUM BROMIDE 25 MG: 10 INJECTION INTRAVENOUS at 12:01

## 2021-02-26 RX ADMIN — PHENYLEPHRINE HYDROCHLORIDE 80 MCG: 10 INJECTION, SOLUTION INTRAMUSCULAR; INTRAVENOUS; SUBCUTANEOUS at 11:38

## 2021-02-26 RX ADMIN — BUPIVACAINE HYDROCHLORIDE 10 ML: 5 INJECTION, SOLUTION EPIDURAL; INTRACAUDAL; PERINEURAL at 10:35

## 2021-02-26 RX ADMIN — MIDAZOLAM 2 MG: 1 INJECTION INTRAMUSCULAR; INTRAVENOUS at 10:21

## 2021-02-26 RX ADMIN — SODIUM CHLORIDE, POTASSIUM CHLORIDE, SODIUM LACTATE AND CALCIUM CHLORIDE 500 ML: 600; 310; 30; 20 INJECTION, SOLUTION INTRAVENOUS at 21:20

## 2021-02-26 RX ADMIN — SODIUM CHLORIDE, POTASSIUM CHLORIDE, SODIUM LACTATE AND CALCIUM CHLORIDE: 600; 310; 30; 20 INJECTION, SOLUTION INTRAVENOUS at 09:57

## 2021-02-26 RX ADMIN — ALBUTEROL SULFATE 2.5 MG: 2.5 SOLUTION RESPIRATORY (INHALATION) at 22:24

## 2021-02-26 RX ADMIN — PHENYLEPHRINE HYDROCHLORIDE 80 MCG: 10 INJECTION, SOLUTION INTRAMUSCULAR; INTRAVENOUS; SUBCUTANEOUS at 11:21

## 2021-02-26 RX ADMIN — PHENYLEPHRINE HYDROCHLORIDE 80 MCG: 10 INJECTION, SOLUTION INTRAMUSCULAR; INTRAVENOUS; SUBCUTANEOUS at 14:33

## 2021-02-26 RX ADMIN — PHENYLEPHRINE HYDROCHLORIDE 80 MCG: 10 INJECTION, SOLUTION INTRAMUSCULAR; INTRAVENOUS; SUBCUTANEOUS at 11:50

## 2021-02-26 RX ADMIN — PHENYLEPHRINE HYDROCHLORIDE 80 MCG: 10 INJECTION, SOLUTION INTRAMUSCULAR; INTRAVENOUS; SUBCUTANEOUS at 14:59

## 2021-02-26 RX ADMIN — DEXAMETHASONE SODIUM PHOSPHATE 4 MG: 4 INJECTION, SOLUTION INTRAMUSCULAR; INTRAVENOUS at 11:23

## 2021-02-26 RX ADMIN — METHOCARBAMOL TABLETS 500 MG: 500 TABLET, COATED ORAL at 22:56

## 2021-02-26 RX ADMIN — PHENYLEPHRINE HYDROCHLORIDE 80 MCG: 10 INJECTION, SOLUTION INTRAMUSCULAR; INTRAVENOUS; SUBCUTANEOUS at 13:10

## 2021-02-26 RX ADMIN — PHENYLEPHRINE HYDROCHLORIDE 80 MCG: 10 INJECTION, SOLUTION INTRAMUSCULAR; INTRAVENOUS; SUBCUTANEOUS at 12:51

## 2021-02-26 RX ADMIN — PHENYLEPHRINE HYDROCHLORIDE 80 MCG: 10 INJECTION, SOLUTION INTRAMUSCULAR; INTRAVENOUS; SUBCUTANEOUS at 15:21

## 2021-02-26 RX ADMIN — SUGAMMADEX 200 MG: 100 INJECTION, SOLUTION INTRAVENOUS at 17:14

## 2021-02-26 RX ADMIN — PHENYLEPHRINE HYDROCHLORIDE 160 MCG: 10 INJECTION, SOLUTION INTRAMUSCULAR; INTRAVENOUS; SUBCUTANEOUS at 15:35

## 2021-02-26 RX ADMIN — PHENYLEPHRINE HYDROCHLORIDE 80 MCG: 10 INJECTION, SOLUTION INTRAMUSCULAR; INTRAVENOUS; SUBCUTANEOUS at 17:05

## 2021-02-26 RX ADMIN — PHENYLEPHRINE HYDROCHLORIDE 80 MCG: 10 INJECTION, SOLUTION INTRAMUSCULAR; INTRAVENOUS; SUBCUTANEOUS at 15:39

## 2021-02-26 RX ADMIN — LIDOCAINE HYDROCHLORIDE 100 MG: 20 INJECTION, SOLUTION INTRAVENOUS at 11:06

## 2021-02-26 RX ADMIN — SODIUM CHLORIDE, SODIUM LACTATE, POTASSIUM CHLORIDE, AND CALCIUM CHLORIDE: .6; .31; .03; .02 INJECTION, SOLUTION INTRAVENOUS at 12:24

## 2021-02-26 RX ADMIN — SODIUM CHLORIDE, SODIUM LACTATE, POTASSIUM CHLORIDE, AND CALCIUM CHLORIDE: .6; .31; .03; .02 INJECTION, SOLUTION INTRAVENOUS at 16:07

## 2021-02-26 RX ADMIN — EPHEDRINE SULFATE 10 MG: 50 INJECTION INTRAVENOUS at 15:56

## 2021-02-26 RX ADMIN — PHENYLEPHRINE HYDROCHLORIDE 80 MCG: 10 INJECTION, SOLUTION INTRAMUSCULAR; INTRAVENOUS; SUBCUTANEOUS at 15:42

## 2021-02-26 RX ADMIN — ACETAMINOPHEN 1000 MG: 500 TABLET, COATED ORAL at 22:17

## 2021-02-26 RX ADMIN — PHENYLEPHRINE HYDROCHLORIDE 80 MCG: 10 INJECTION, SOLUTION INTRAMUSCULAR; INTRAVENOUS; SUBCUTANEOUS at 11:32

## 2021-02-26 RX ADMIN — MAGNESIUM SULFATE HEPTAHYDRATE 6000 MG: 500 INJECTION, SOLUTION INTRAMUSCULAR; INTRAVENOUS at 19:50

## 2021-02-26 RX ADMIN — INSULIN HUMAN 4 UNITS: 100 INJECTION, SOLUTION PARENTERAL at 17:51

## 2021-02-26 RX ADMIN — SODIUM CHLORIDE, POTASSIUM CHLORIDE, SODIUM LACTATE AND CALCIUM CHLORIDE: 600; 310; 30; 20 INJECTION, SOLUTION INTRAVENOUS at 13:36

## 2021-02-26 RX ADMIN — PHENYLEPHRINE HYDROCHLORIDE 20 MCG/MIN: 10 INJECTION, SOLUTION INTRAMUSCULAR; INTRAVENOUS; SUBCUTANEOUS at 16:02

## 2021-02-26 RX ADMIN — METRONIDAZOLE 500 MG: 500 INJECTION, SOLUTION INTRAVENOUS at 10:59

## 2021-02-26 RX ADMIN — BUPIVACAINE 10 ML: 13.3 INJECTION, SUSPENSION, LIPOSOMAL INFILTRATION at 10:40

## 2021-02-26 RX ADMIN — PHENYLEPHRINE HYDROCHLORIDE 80 MCG: 10 INJECTION, SOLUTION INTRAMUSCULAR; INTRAVENOUS; SUBCUTANEOUS at 12:34

## 2021-02-26 RX ADMIN — LEVOFLOXACIN 500 MG: 5 INJECTION, SOLUTION INTRAVENOUS at 11:11

## 2021-02-26 RX ADMIN — ONDANSETRON 4 MG: 2 INJECTION INTRAMUSCULAR; INTRAVENOUS at 16:40

## 2021-02-26 RX ADMIN — ROCURONIUM BROMIDE 20 MG: 10 INJECTION INTRAVENOUS at 14:33

## 2021-02-26 RX ADMIN — CALCIUM GLUCONATE 1000 MG: 98 INJECTION, SOLUTION INTRAVENOUS at 19:50

## 2021-02-26 RX ADMIN — PHENYLEPHRINE HYDROCHLORIDE 160 MCG: 10 INJECTION, SOLUTION INTRAMUSCULAR; INTRAVENOUS; SUBCUTANEOUS at 15:46

## 2021-02-26 RX ADMIN — ROCURONIUM BROMIDE 30 MG: 10 INJECTION INTRAVENOUS at 14:08

## 2021-02-26 RX ADMIN — PHENYLEPHRINE HYDROCHLORIDE 200 MCG: 10 INJECTION, SOLUTION INTRAMUSCULAR; INTRAVENOUS; SUBCUTANEOUS at 15:54

## 2021-02-26 RX ADMIN — PHENYLEPHRINE HYDROCHLORIDE 80 MCG: 10 INJECTION, SOLUTION INTRAMUSCULAR; INTRAVENOUS; SUBCUTANEOUS at 13:25

## 2021-02-26 RX ADMIN — PROPOFOL 200 MG: 10 INJECTION, EMULSION INTRAVENOUS at 11:07

## 2021-02-26 RX ADMIN — ALBUMIN (HUMAN) 250 ML: 12.5 INJECTION, SOLUTION INTRAVENOUS at 15:44

## 2021-02-26 RX ADMIN — ROCURONIUM BROMIDE 50 MG: 10 INJECTION INTRAVENOUS at 11:08

## 2021-02-26 RX ADMIN — PHENYLEPHRINE HYDROCHLORIDE 80 MCG: 10 INJECTION, SOLUTION INTRAMUSCULAR; INTRAVENOUS; SUBCUTANEOUS at 13:24

## 2021-02-26 RX ADMIN — PHENYLEPHRINE HYDROCHLORIDE 80 MCG: 10 INJECTION, SOLUTION INTRAMUSCULAR; INTRAVENOUS; SUBCUTANEOUS at 11:42

## 2021-02-26 RX ADMIN — PHENYLEPHRINE HYDROCHLORIDE 80 MCG: 10 INJECTION, SOLUTION INTRAMUSCULAR; INTRAVENOUS; SUBCUTANEOUS at 13:01

## 2021-02-26 RX ADMIN — PHENYLEPHRINE HYDROCHLORIDE 80 MCG: 10 INJECTION, SOLUTION INTRAMUSCULAR; INTRAVENOUS; SUBCUTANEOUS at 12:32

## 2021-02-26 RX ADMIN — SODIUM CHLORIDE, POTASSIUM CHLORIDE, SODIUM LACTATE AND CALCIUM CHLORIDE: 600; 310; 30; 20 INJECTION, SOLUTION INTRAVENOUS at 21:59

## 2021-02-26 RX ADMIN — SODIUM CHLORIDE, SODIUM LACTATE, POTASSIUM CHLORIDE, AND CALCIUM CHLORIDE 500 ML: .6; .31; .03; .02 INJECTION, SOLUTION INTRAVENOUS at 19:21

## 2021-02-26 RX ADMIN — INSULIN LISPRO 2 UNITS: 100 INJECTION, SOLUTION INTRAVENOUS; SUBCUTANEOUS at 23:25

## 2021-02-26 RX ADMIN — PHENYLEPHRINE HYDROCHLORIDE 200 MCG: 10 INJECTION, SOLUTION INTRAMUSCULAR; INTRAVENOUS; SUBCUTANEOUS at 16:07

## 2021-02-26 RX ADMIN — ENOXAPARIN SODIUM 40 MG: 40 INJECTION SUBCUTANEOUS at 09:59

## 2021-02-26 RX ADMIN — PHENYLEPHRINE HYDROCHLORIDE 160 MCG: 10 INJECTION, SOLUTION INTRAMUSCULAR; INTRAVENOUS; SUBCUTANEOUS at 15:30

## 2021-02-26 RX ADMIN — FENTANYL CITRATE 100 MCG: 50 INJECTION, SOLUTION INTRAMUSCULAR; INTRAVENOUS at 11:06

## 2021-02-26 RX ADMIN — PHENYLEPHRINE HYDROCHLORIDE 80 MCG: 10 INJECTION, SOLUTION INTRAMUSCULAR; INTRAVENOUS; SUBCUTANEOUS at 11:23

## 2021-02-26 RX ADMIN — PHENYLEPHRINE HYDROCHLORIDE 80 MCG: 10 INJECTION, SOLUTION INTRAMUSCULAR; INTRAVENOUS; SUBCUTANEOUS at 15:14

## 2021-02-26 RX ADMIN — PHENYLEPHRINE HYDROCHLORIDE 80 MCG: 10 INJECTION, SOLUTION INTRAMUSCULAR; INTRAVENOUS; SUBCUTANEOUS at 15:43

## 2021-02-26 RX ADMIN — FENTANYL CITRATE 100 MCG: 50 INJECTION INTRAMUSCULAR; INTRAVENOUS at 10:22

## 2021-02-26 RX ADMIN — PHENYLEPHRINE HYDROCHLORIDE 80 MCG: 10 INJECTION, SOLUTION INTRAMUSCULAR; INTRAVENOUS; SUBCUTANEOUS at 15:34

## 2021-02-26 RX ADMIN — PHENYLEPHRINE HYDROCHLORIDE 160 MCG: 10 INJECTION, SOLUTION INTRAMUSCULAR; INTRAVENOUS; SUBCUTANEOUS at 15:37

## 2021-02-26 RX ADMIN — ONDANSETRON 4 MG: 2 INJECTION INTRAMUSCULAR; INTRAVENOUS at 23:27

## 2021-02-26 RX ADMIN — SODIUM CHLORIDE, SODIUM LACTATE, POTASSIUM CHLORIDE, AND CALCIUM CHLORIDE: .6; .31; .03; .02 INJECTION, SOLUTION INTRAVENOUS at 14:52

## 2021-02-26 RX ADMIN — HYDROMORPHONE HYDROCHLORIDE 0.5 MG: 2 INJECTION, SOLUTION INTRAMUSCULAR; INTRAVENOUS; SUBCUTANEOUS at 12:01

## 2021-02-26 RX ADMIN — ALBUMIN (HUMAN) 250 ML: 12.5 INJECTION, SOLUTION INTRAVENOUS at 14:59

## 2021-02-26 RX ADMIN — PHENYLEPHRINE HYDROCHLORIDE 200 MCG: 10 INJECTION, SOLUTION INTRAMUSCULAR; INTRAVENOUS; SUBCUTANEOUS at 15:55

## 2021-02-26 RX ADMIN — PROPOFOL 20 MCG/KG/MIN: 10 INJECTION, EMULSION INTRAVENOUS at 12:46

## 2021-02-26 RX ADMIN — PHENYLEPHRINE HYDROCHLORIDE 80 MCG: 10 INJECTION, SOLUTION INTRAMUSCULAR; INTRAVENOUS; SUBCUTANEOUS at 17:03

## 2021-02-26 RX ADMIN — BUPIVACAINE 10 ML: 13.3 INJECTION, SUSPENSION, LIPOSOMAL INFILTRATION at 10:35

## 2021-02-26 RX ADMIN — SODIUM CHLORIDE, SODIUM LACTATE, POTASSIUM CHLORIDE, AND CALCIUM CHLORIDE: .6; .31; .03; .02 INJECTION, SOLUTION INTRAVENOUS at 11:13

## 2021-02-26 RX ADMIN — EPHEDRINE SULFATE 10 MG: 50 INJECTION INTRAVENOUS at 16:02

## 2021-02-26 RX ADMIN — HYDROMORPHONE HYDROCHLORIDE 0.5 MG: 2 INJECTION, SOLUTION INTRAMUSCULAR; INTRAVENOUS; SUBCUTANEOUS at 16:39

## 2021-02-26 RX ADMIN — ROCURONIUM BROMIDE 25 MG: 10 INJECTION INTRAVENOUS at 13:26

## 2021-02-26 RX ADMIN — SODIUM CHLORIDE, POTASSIUM CHLORIDE, SODIUM LACTATE AND CALCIUM CHLORIDE: 600; 310; 30; 20 INJECTION, SOLUTION INTRAVENOUS at 09:58

## 2021-02-26 RX ADMIN — HYDROMORPHONE HYDROCHLORIDE 0.5 MG: 2 INJECTION, SOLUTION INTRAMUSCULAR; INTRAVENOUS; SUBCUTANEOUS at 13:37

## 2021-02-26 RX ADMIN — ROCURONIUM BROMIDE 50 MG: 10 INJECTION INTRAVENOUS at 16:02

## 2021-02-26 RX ADMIN — PHENYLEPHRINE HYDROCHLORIDE 80 MCG: 10 INJECTION, SOLUTION INTRAMUSCULAR; INTRAVENOUS; SUBCUTANEOUS at 13:20

## 2021-02-26 RX ADMIN — PHENYLEPHRINE HYDROCHLORIDE 160 MCG: 10 INJECTION, SOLUTION INTRAMUSCULAR; INTRAVENOUS; SUBCUTANEOUS at 15:40

## 2021-02-26 RX ADMIN — PHENYLEPHRINE HYDROCHLORIDE 80 MCG: 10 INJECTION, SOLUTION INTRAMUSCULAR; INTRAVENOUS; SUBCUTANEOUS at 17:08

## 2021-02-26 RX ADMIN — PHENYLEPHRINE HYDROCHLORIDE 400 MCG: 10 INJECTION, SOLUTION INTRAMUSCULAR; INTRAVENOUS; SUBCUTANEOUS at 15:58

## 2021-02-26 RX ADMIN — PHENYLEPHRINE HYDROCHLORIDE 80 MCG: 10 INJECTION, SOLUTION INTRAMUSCULAR; INTRAVENOUS; SUBCUTANEOUS at 13:07

## 2021-02-26 RX ADMIN — Medication 0.2 MG: at 11:14

## 2021-02-26 RX ADMIN — EPHEDRINE SULFATE 10 MG: 50 INJECTION INTRAVENOUS at 15:51

## 2021-02-26 ASSESSMENT — PULMONARY FUNCTION TESTS
PIF_VALUE: 25
PIF_VALUE: 26
PIF_VALUE: 30
PIF_VALUE: 24
PIF_VALUE: 31
PIF_VALUE: 31
PIF_VALUE: 25
PIF_VALUE: 22
PIF_VALUE: 32
PIF_VALUE: 34
PIF_VALUE: 26
PIF_VALUE: 31
PIF_VALUE: 33
PIF_VALUE: 28
PIF_VALUE: 34
PIF_VALUE: 35
PIF_VALUE: 28
PIF_VALUE: 36
PIF_VALUE: 34
PIF_VALUE: 34
PIF_VALUE: 35
PIF_VALUE: 31
PIF_VALUE: 31
PIF_VALUE: 33
PIF_VALUE: 33
PIF_VALUE: 32
PIF_VALUE: 34
PIF_VALUE: 25
PIF_VALUE: 32
PIF_VALUE: 25
PIF_VALUE: 26
PIF_VALUE: 22
PIF_VALUE: 31
PIF_VALUE: 26
PIF_VALUE: 35
PIF_VALUE: 33
PIF_VALUE: 32
PIF_VALUE: 32
PIF_VALUE: 27
PIF_VALUE: 28
PIF_VALUE: 25
PIF_VALUE: 26
PIF_VALUE: 35
PIF_VALUE: 35
PIF_VALUE: 31
PIF_VALUE: 32
PIF_VALUE: 27
PIF_VALUE: 31
PIF_VALUE: 33
PIF_VALUE: 30
PIF_VALUE: 26
PIF_VALUE: 36
PIF_VALUE: 33
PIF_VALUE: 27
PIF_VALUE: 31
PIF_VALUE: 35
PIF_VALUE: 31
PIF_VALUE: 14
PIF_VALUE: 26
PIF_VALUE: 14
PIF_VALUE: 25
PIF_VALUE: 33
PIF_VALUE: 26
PIF_VALUE: 31
PIF_VALUE: 25
PIF_VALUE: 25
PIF_VALUE: 34
PIF_VALUE: 33
PIF_VALUE: 22
PIF_VALUE: 25
PIF_VALUE: 34
PIF_VALUE: 24
PIF_VALUE: 35
PIF_VALUE: 34
PIF_VALUE: 27
PIF_VALUE: 25
PIF_VALUE: 26
PIF_VALUE: 25
PIF_VALUE: 33
PIF_VALUE: 34
PIF_VALUE: 27
PIF_VALUE: 35
PIF_VALUE: 25
PIF_VALUE: 25
PIF_VALUE: 2
PIF_VALUE: 37
PIF_VALUE: 25
PIF_VALUE: 22
PIF_VALUE: 35
PIF_VALUE: 14
PIF_VALUE: 34
PIF_VALUE: 33
PIF_VALUE: 36
PIF_VALUE: 36
PIF_VALUE: 27
PIF_VALUE: 1
PIF_VALUE: 26
PIF_VALUE: 31
PIF_VALUE: 34
PIF_VALUE: 31
PIF_VALUE: 36
PIF_VALUE: 31
PIF_VALUE: 31
PIF_VALUE: 34
PIF_VALUE: 24
PIF_VALUE: 23
PIF_VALUE: 25
PIF_VALUE: 33
PIF_VALUE: 28
PIF_VALUE: 34
PIF_VALUE: 26
PIF_VALUE: 27
PIF_VALUE: 24
PIF_VALUE: 27
PIF_VALUE: 33
PIF_VALUE: 34
PIF_VALUE: 26
PIF_VALUE: 35
PIF_VALUE: 32
PIF_VALUE: 26
PIF_VALUE: 22
PIF_VALUE: 26
PIF_VALUE: 34
PIF_VALUE: 36
PIF_VALUE: 34
PIF_VALUE: 33
PIF_VALUE: 34
PIF_VALUE: 31
PIF_VALUE: 25
PIF_VALUE: 29
PIF_VALUE: 31
PIF_VALUE: 25
PIF_VALUE: 32
PIF_VALUE: 32
PIF_VALUE: 34
PIF_VALUE: 25
PIF_VALUE: 32
PIF_VALUE: 25
PIF_VALUE: 31
PIF_VALUE: 30
PIF_VALUE: 1
PIF_VALUE: 31
PIF_VALUE: 30
PIF_VALUE: 34
PIF_VALUE: 27
PIF_VALUE: 36
PIF_VALUE: 37
PIF_VALUE: 22
PIF_VALUE: 33
PIF_VALUE: 14
PIF_VALUE: 1
PIF_VALUE: 29
PIF_VALUE: 26
PIF_VALUE: 26
PIF_VALUE: 35
PIF_VALUE: 31
PIF_VALUE: 28
PIF_VALUE: 1
PIF_VALUE: 26
PIF_VALUE: 0
PIF_VALUE: 32
PIF_VALUE: 25
PIF_VALUE: 24
PIF_VALUE: 33
PIF_VALUE: 25
PIF_VALUE: 32
PIF_VALUE: 37
PIF_VALUE: 37
PIF_VALUE: 33
PIF_VALUE: 25
PIF_VALUE: 5
PIF_VALUE: 25
PIF_VALUE: 25
PIF_VALUE: 26
PIF_VALUE: 26
PIF_VALUE: 37
PIF_VALUE: 37
PIF_VALUE: 26
PIF_VALUE: 25
PIF_VALUE: 25
PIF_VALUE: 26
PIF_VALUE: 34
PIF_VALUE: 27
PIF_VALUE: 33
PIF_VALUE: 37
PIF_VALUE: 35
PIF_VALUE: 31
PIF_VALUE: 24
PIF_VALUE: 33
PIF_VALUE: 26
PIF_VALUE: 14
PIF_VALUE: 30
PIF_VALUE: 25
PIF_VALUE: 25
PIF_VALUE: 32
PIF_VALUE: 34
PIF_VALUE: 33
PIF_VALUE: 25
PIF_VALUE: 35
PIF_VALUE: 24
PIF_VALUE: 26
PIF_VALUE: 5
PIF_VALUE: 25
PIF_VALUE: 33
PIF_VALUE: 36
PIF_VALUE: 22
PIF_VALUE: 33

## 2021-02-26 ASSESSMENT — PAIN - FUNCTIONAL ASSESSMENT
PAIN_FUNCTIONAL_ASSESSMENT: FACES
PAIN_FUNCTIONAL_ASSESSMENT: 0-10

## 2021-02-26 ASSESSMENT — PAIN SCALES - GENERAL
PAINLEVEL_OUTOF10: 0
PAINLEVEL_OUTOF10: 10
PAINLEVEL_OUTOF10: 0

## 2021-02-26 NOTE — PROGRESS NOTES
Time out for tap block with patient, Dr. Maite Cho and this nurse done. See MAR for medication admin. Vitals taken and stable. No signs of resp distress.

## 2021-02-26 NOTE — H&P
Hector Padilla    [id number]    [facility] Same Day Surgery Update H & P  Department of General Surgery   Surgical Service   Pre-operative History and Physical  Last H & P within the last 30 days. DIAGNOSIS:   Cancer of descending colon ([address] [C18.6]    Procedure(s):  ROBOTIC VERSUS LAPAROSCOPIC LEFT COLECTOMY, POSSIBLE OPEN, POSSIBLE OSTOMY     HISTORY OF PRESENT ILLNESS:   Patient is a morbidly obese (Body mass index is 43.36 kg/m².), 65 y.o. female with colon cancer who presents today for the above procedure. Covid 19:  Patient denies fever, chills, cough or known exposure to Covid-19. Past Medical History:        Diagnosis Date    Allergic rhinitis     Asthma     Back pain     GI bleeding 12/18/2014    Glaucoma     both eyes    Hypertension     Knee pain     Morbid obesity due to excess calories ([address] 11/2/2015    Osteoporosis     Type II or unspecified type diabetes mellitus without mention of complication, not stated as uncontrolled      Past Surgical History:        Procedure Laterality Date    COLONOSCOPY  9/27/2014    polyp removed    COLONOSCOPY  1/30/2020    COLONOSCOPY POLYPECTOMY SNARE/COLD BIOPSY performed by Gildardo Hunt MD at [address] [facility] COLONOSCOPY  1/30/2020    COLONOSCOPY SUBMUCOSAL/BOTOX INJECTION performed by Gildardo Hunt MD at [address] [facility] Left     knee scoped and \"cleaned out\"     Past Social History:  Social History     Socioeconomic History    Marital status:       Spouse name: Not on file    Number of children: Not on file    Years of education: Not on file    Highest education level: Not on file   Occupational History    Not on file   Social Needs    Financial resource strain: Not on file    Food insecurity     Worry: Not on file     Inability: Not on file    Transportation needs     Medical: Not on file     Non-medical: Not on file   Tobacco Use    Smoking status: Former Smoker Packs/day: 0.50     Years: 20.00     Pack years: 10.00     Types: Cigarettes     Start date: 1984     Quit date: 1993     Years since quittin.4    Smokeless tobacco: Never Used   Substance and Sexual Activity    Alcohol use: No    Drug use: No    Sexual activity: Not on file   Lifestyle    Physical activity     Days per week: Not on file     Minutes per session: Not on file    Stress: Not on file   Relationships    Social connections     Talks on phone: Not on file     Gets together: Not on file     Attends Sikh service: Not on file     Active member of club or organization: Not on file     Attends meetings of clubs or organizations: Not on file     Relationship status: Not on file    Intimate partner violence     Fear of current or ex partner: Not on file     Emotionally abused: Not on file     Physically abused: Not on file     Forced sexual activity: Not on file   Other Topics Concern    Not on file   Social History Narrative    Not on file         Medications Prior to Admission:      Prior to Admission medications    Medication Sig Start Date End Date Taking?  Authorizing Provider   sertraline (ZOLOFT) 50 MG tablet TAKE 2 TABLETS (100MG) BY MOUTH 2 TIMES A DAY (MOOD) 21   DAVID Martinez   metoprolol tartrate (LOPRESSOR) 25 MG tablet TAKE 1 TABLET (25MG) BY MOUTH 2 TIMES A DAY (BLOOD PRESSURE) 21   DAVID Martinez   insulin NPH (HUMULIN N) 100 UNIT/ML injection vial Inject 13 units twice a day under skin 21   Bella Armstrong MD   famotidine (PEPCID) 20 MG tablet One a day 21   Bella Armstrong MD   hydroCHLOROthiazide (HYDRODIURIL) 25 MG tablet TAKE 1 TABLET (25MG) BY MOUTH EVERY DAY (DIURECTIC/ BLOOD PRESSURE) 21   Bella Armstrong MD   Multiple Vitamins-Minerals (MULTIVITAMIN WITH MINERALS) tablet Take 1 tablet by mouth daily 21   Bella Armstrong MD   vitamin D (ERGOCALCIFEROL) 1.25 MG (53542 UT) CAPS capsule TAKE 1 CAPSULE BY MOUTH EVERY MONTH 12/29/20   Dario Navarrete MD   fluticasone [place] Borne) 50 MCG/ACT nasal spray 1 spray by Nasal route daily 12/28/20 12/28/21  Dario Navarrete MD   montelukast (SINGULAIR) 10 MG tablet TAKE 1 TABLET (10MG) BY MOUTH EVERY DAY 12/4/20   Dario Navarrete MD   Exenatide (BYDUREON) 2 MG PEN Inject 1 pen into the skin every 7 days 12/4/20   Dario Navarrete MD   atorvastatin (LIPITOR) 40 MG tablet TAKE 1 TABLET (40MG) BY MOUTH EVERY DAY (CHOLESTEROL) 12/4/20   Daroi Navarrete MD   alendronate (FOSAMAX) 70 MG tablet TAKE 1 TAB (70MG) BY MOUTH PER WK BEFORE BREAKFAST EMPTY STOMACH SIT U P 30MIN (BONES) 12/4/20   Dario Navarrete MD   amLODIPine (NORVASC) 10 MG tablet TAKE 1 TABLET BY MOUTH EVERY DAY 12/4/20   Dario Navarrete MD   Tiotropium Bromide-Olodaterol (STIOLTO RESPIMAT) 2.5-2.5 MCG/ACT AERS 2 ouffs once a day 11/4/20   Dario Navarrete MD   metFORMIN (GLUCOPHAGE) 1000 MG tablet TAKE 1 TABLET (1000MG) BY MOUTH 2 TIMES A DAY WITH MEALS (BLOOD SUGAR/DIABETES) 9/21/20   Dario Navarrete MD   insulin regular (HUMULIN R) 100 UNIT/ML injection Inject 4 Units into the skin See Admin Instructions Before meals for blood sugars  > 150 9/21/20   Dario Navarrete MD   blood glucose test strips (ACCU-CHEK BRAVO PLUS) strip USE TO TEST GLUCOSE THREE TIMES DAILY 9/21/20   Dario Navarrete MD   tiZANidine (ZANAFLEX) 2 MG tablet One or two tabs prn bedtime 8/31/20   Dario Navarrete MD   loratadine (ALLERGY RELIEF) 10 MG tablet TAKE 1 TABLET (10MG) BY MOUTH EVERY DAY (ALLERGIES) 7/27/20   Dario Navarrete MD   albuterol sulfate  (90 Base) MCG/ACT inhaler INHALE 2 PUFFS BY MOUTH EVERY 6 HOURS AS NEEDED 6/30/20   Dario Navarrete MD   Blood Pressure KIT Ck blood pressure once a week 4/17/20   Dario Navarrete MD   metroNIDAZOLE (FLAGYL) 500 MG tablet Take one tablet by mouth 3 times on the day prior to surgery.  Take one tablet at 1pm, 2pm and 9pm.  Patient not taking: Reported on 2/22/2021 3/13/20   Alesia Govea MD   neomycin (MYCIFRADIN) 500 MG tablet Provider, MD         Allergies:  Pantoprazole sodium, Bactrim [sulfamethoxazole-trimethoprim], Enalapril, Lisinopril, Milk-related compounds, Pcn [penicillins], and Tape [adhesive tape]    PHYSICAL EXAM:      BP (!) 169/85   Pulse 79   Temp 98.8 °F (37.1 °C) (Oral)   Resp 17   Ht 5' (1.524 m)   Wt 222 lb (100.7 kg)   SpO2 97%   BMI 43.36 kg/m²      Airway:  Airway patent with no audible stridor    Heart:  regular rate and rhythm, No murmur noted    Lungs:  No increased work of breathing, good air exchange, clear to auscultation bilaterally, no crackles or wheezing    Abdomen:  soft, non-distended, non-tender, no rebound tenderness or guarding, normal active bowel sounds and no masses palpated    ASSESSMENT AND PLAN     Patient is a 65 y.o. female with above specified procedure planned. 1.  The patients history and physical was obtained and signed off by the pre-admission testing department. Patient seen and focused exam done today- no new changes since last physical exam on 2/22/21    2. Access to ancillary services are available per request of the provider.     Jin Tapia, APRN - CNP     2/26/2021

## 2021-02-26 NOTE — PROGRESS NOTES
Patient to PACU 8 s/p ROBOTIC VERSUS LAPAROSCOPIC LEFT COLECTOMY, report received from CRNA, reported hypotension intra op, see emar. All vitals stable at this time.  Oral airway intact

## 2021-02-26 NOTE — BRIEF OP NOTE
Brief Postoperative Note      Patient: Jeane Ríos  YOB: 1950  MRN: [id number]    Date of Procedure: 2/26/2021    Pre-Op Diagnosis: Cancer of descending colon ([facility] [address] [C18.6]    Post-Op Diagnosis: Same       Procedure(s):  ROBOTIC, HAND ASSISTED LAPAROSCOPIC PARTIAL COLECTOMY    Surgeon(s):  MD Perla Carranza MD    Assistant:  Surgical Assistant: Dillan Peñaloza; [address] [facility]  Resident: Shaun Lyn DO    Anesthesia: General    Estimated Blood Loss (mL): 899    Complications: Bleeding    Specimens:   ID Type Source Tests Collected by Time Destination   A : Partial Colectomy Tissue Tissue SURGICAL PATHOLOGY Mary Maher MD 2/26/2021 1517        Implants:  * No implants in log *      Drains:   Urethral Catheter 16 fr (Active)       Findings: Partial L colectomy specimen opened, entire tattoo with good margins proximal and distal. Anastomosis patent, negative leak test. Mesenteric bleed controlled with ligasure device, hemostatic prior to closure.      Electronically signed by Shaun Lyn DO on 2/26/2021 at 5:28 PM

## 2021-02-26 NOTE — ANESTHESIA PRE PROCEDURE
Department of Anesthesiology  Preprocedure Note       Name:  Tabby Waller   Age:  65 y.o.  :  1950                                          MRN:  [id number]         Date:  2021      Surgeon: Konstantin Calvillo):  Velia Reina MD    Procedure: Procedure(s):  ROBOTIC VERSUS LAPAROSCOPIC LEFT COLECTOMY, POSSIBLE OPEN, POSSIBLE OSTOMY    Medications prior to admission:   Prior to Admission medications    Medication Sig Start Date End Date Taking?  Authorizing Provider   sertraline (ZOLOFT) 50 MG tablet TAKE 2 TABLETS (100MG) BY MOUTH 2 TIMES A DAY (MOOD) 21   DAVID Martinez   metoprolol tartrate (LOPRESSOR) 25 MG tablet TAKE 1 TABLET (25MG) BY MOUTH 2 TIMES A DAY (BLOOD PRESSURE) 21   DAVID Martinez   insulin NPH (HUMULIN N) 100 UNIT/ML injection vial Inject 13 units twice a day under skin 21   hSelley Luna MD   famotidine (PEPCID) 20 MG tablet One a day 21   Shelley Luna MD   hydroCHLOROthiazide (HYDRODIURIL) 25 MG tablet TAKE 1 TABLET (25MG) BY MOUTH EVERY DAY (DIURECTIC/ BLOOD PRESSURE) 21   Shelley Luna MD   Multiple Vitamins-Minerals (MULTIVITAMIN WITH MINERALS) tablet Take 1 tablet by mouth daily 21   Shelley Luna MD   vitamin D (ERGOCALCIFEROL) 1.25 MG (73169 UT) CAPS capsule TAKE 1 CAPSULE BY MOUTH EVERY MONTH 20   Shelley Luna MD   fluticasone [facility]) 50 MCG/ACT nasal spray 1 spray by Nasal route daily 20  Shelley Luna MD   montelukast (SINGULAIR) 10 MG tablet TAKE 1 TABLET (10MG) BY MOUTH EVERY DAY 20   Shelley Luna MD   Exenatide (BYDUREON) 2 MG PEN Inject 1 pen into the skin every 7 days 20   Shelley Luna MD   atorvastatin (LIPITOR) 40 MG tablet TAKE 1 TABLET (40MG) BY MOUTH EVERY DAY (CHOLESTEROL) 20   Shelley Luna MD   alendronate (FOSAMAX) 70 MG tablet TAKE 1 TAB (70MG) BY MOUTH PER WK BEFORE BREAKFAST EMPTY STOMACH SIT U P 30MIN (BONES) 20   Shelley Luna MD   amLODIPine (NORVASC) 10 MG tablet TAKE 1 TABLET BY MOUTH EVERY DAY 12/4/20   Anh Garzon MD   Tiotropium Bromide-Olodaterol (STIOLTO RESPIMAT) 2.5-2.5 MCG/ACT AERS 2 ouffs once a day 11/4/20   Anh Garzon MD   metFORMIN (GLUCOPHAGE) 1000 MG tablet TAKE 1 TABLET (1000MG) BY MOUTH 2 TIMES A DAY WITH MEALS (BLOOD SUGAR/DIABETES) 9/21/20   Anh Garzon MD   insulin regular (HUMULIN R) 100 UNIT/ML injection Inject 4 Units into the skin See Admin Instructions Before meals for blood sugars  > 150 9/21/20   Anh Garzon MD   blood glucose test strips (ACCU-CHEK BRAVO PLUS) strip USE TO TEST GLUCOSE THREE TIMES DAILY 9/21/20   Anh Garzon MD   tiZANidine (ZANAFLEX) 2 MG tablet One or two tabs prn bedtime 8/31/20   Anh Garzon MD   loratadine (ALLERGY RELIEF) 10 MG tablet TAKE 1 TABLET (10MG) BY MOUTH EVERY DAY (ALLERGIES) 7/27/20   Anh Garzon MD   albuterol sulfate  (90 Base) MCG/ACT inhaler INHALE 2 PUFFS BY MOUTH EVERY 6 HOURS AS NEEDED 6/30/20   Anh Garzon MD   Blood Pressure KIT Ck blood pressure once a week 4/17/20   Anh Garzon MD   metroNIDAZOLE (FLAGYL) 500 MG tablet Take one tablet by mouth 3 times on the day prior to surgery. Take one tablet at 1pm, 2pm and 9pm.  Patient not taking: Reported on 2/22/2021 3/13/20   Ling Leyva MD   neomycin (MYCIFRADIN) 500 MG tablet Take two tablet 3 times the day before surgery. Take two tablet at 1pm, two tablet at 2pm and two tablet at 9pm.  Patient not taking: Reported on 2/22/2021 3/13/20   Ling Leyva MD   metroNIDAZOLE (FLAGYL) 500 MG tablet Take one tablet by mouth 3 times on the day prior to surgery. Take one tablet at 1pm, 2pm and 9pm.  Patient not taking: Reported on 2/22/2021 2/26/20   Ling Leyva MD   neomycin (MYCIFRADIN) 500 MG tablet Take two tablet 3 times the day before surgery.  Take two tablet at 1pm, two tablet at 2pm and two tablet at 9pm.  Patient not taking: Reported on 2/22/2021 2/26/20   Ling Leyva MD   pravastatin (PRAVACHOL) 20 MG tablet Take one tab a day cancel script pravastatin 10 mg 1/21/20   Ophelia Campos MD   Blood Glucose Monitoring Suppl (TRUE METRIX METER) w/Device KIT Test glucose TID 12/2/19   Ophelia Campos MD   acetaminophen (TYLENOL) 500 MG tablet Take 1 tablet by mouth 4 times daily as needed for Pain 9/9/19   Ophelia Campos MD   albuterol (PROVENTIL) (2.5 MG/3ML) 0.083% nebulizer solution INHALE CONTENTS OF 1 VIAL (3ML) BY MOUTH VIA NEBULIZER 3 TIMES A DAY 9/9/19   Ophelia Campos MD   B-D ULTRAFINE III SHORT PEN 31G X 8 MM MISC USE TO INJECT INSULIN (QID) 9/6/19   Ophelia Campos MD   Alcohol Swabs PADS Use TID to test blood glucose 5/6/19   Ophelia Campos MD   ACCU-CHEK SOFTCLIX LANCETS MISC USE TO TEST BLOOD SUGAR 3 TIMES A DAY 3/8/19   Ophelia Campos MD   beclomethasone (QVAR) 80 MCG/ACT inhaler Inhale 2 puffs into the lungs 2 times daily 10/27/18   Ophelia Campos MD   Blood Glucose Monitoring Suppl GRETCHEN Dispense one true track meter   Test glucose twice a day 8/25/17   Ophelia Campos MD   dorzolamide (TRUSOPT) 2 % ophthalmic solution  8/6/15   Historical Provider, MD   Bimatoprost (LUMIGAN) 0.01 % SOLN Apply  to eye. One drop in each eye at bedtime     Historical Provider, MD   brimonidine (ALPHAGAN P) 0.1 % SOLN 1 drop 2 times daily.  One drop in both eyes every 12 hours    Historical Provider, MD       Current medications:    Current Facility-Administered Medications   Medication Dose Route Frequency Provider Last Rate Last Admin    lactated ringers infusion   Intravenous Continuous Chikis Badillo MD        acetaminophen (TYLENOL) tablet 1,000 mg  1,000 mg Oral Once Chacha Castro MD        enoxaparin (LOVENOX) injection 40 mg  40 mg Subcutaneous Once Chacha Castro MD        metronidazole (FLAGYL) 500 mg in NaCl 100 mL IVPB premix  500 mg Intravenous On Call to Via Aleks Keller MD        And    levoFLOXacin (LEVAQUIN) 500 MG/100ML infusion 500 mg  500 mg Intravenous On Call to Via Aleks Keller MD        sodium chloride flush 0.9 % injection 10 mL  10 mL Intravenous 2 times per day Ellis Wei MD        sodium chloride flush 0.9 % injection 10 mL  10 mL Intravenous PRN Ellis Wei MD        lactated ringers infusion   Intravenous Continuous Jayden Munroe MD        sodium chloride flush 0.9 % injection 10 mL  10 mL Intravenous 2 times per day Jayden Munroe MD        sodium chloride flush 0.9 % injection 10 mL  10 mL Intravenous PRN Jayden Munroe MD        lidocaine PF 1 % injection 1 mL  1 mL Intradermal Once PRN Jayden Munroe MD        midazolam (VERSED) injection 2 mg  2 mg Intravenous Once PRN Jayden Munroe MD        fentaNYL (SUBLIMAZE) injection 100 mcg  100 mcg Intravenous Once Jayden Munroe MD           Allergies:     Allergies   Allergen Reactions    Pantoprazole Sodium Hives    Bactrim [Sulfamethoxazole-Trimethoprim]      ITCHING HIVES, NO RESPIRATORY SXS    Enalapril Swelling     angioedema    Lisinopril Swelling     Lip swelling/angioedema    Milk-Related Compounds Other (See Comments)     Caused GI bleeding (cow's milk)    Pcn [Penicillins] Hives    Tape [Adhesive Tape] Other (See Comments)     Paper tape causes redness, irritation       Problem List:    Patient Active Problem List   Diagnosis Code    DM type 2 (diabetes mellitus, type 2) E11.9    Sinus congestion R09.81    Hypertension I10    Tear of lateral cartilage or meniscus of knee, current S83.289A    Rectal bleeding K62.5    Patellofemoral arthritis M17.10    Hyperlipidemia E78.5    CKD (chronic kidney disease) stage 3, GFR 30-59 ml/min ([facility]) N18.30    Moderate persistent asthma without complication F90.32    Morbid obesity due to excess calories ([facility]) E66.01    Asthma J45.909    Essential hypertension I10    Lateral meniscus tear I76.449Y       Past Medical History:        Diagnosis Date    Allergic rhinitis     Asthma     Back pain     GI bleeding 12/18/2014    Glaucoma     both eyes    Hypertension  Knee pain     Morbid obesity due to excess calories ([facility] [address] 2015    Osteoporosis     Type II or unspecified type diabetes mellitus without mention of complication, not stated as uncontrolled        Past Surgical History:        Procedure Laterality Date    COLONOSCOPY  2014    polyp removed    COLONOSCOPY  2020    COLONOSCOPY POLYPECTOMY SNARE/COLD BIOPSY performed by Umesh Rosas MD at [facility] [address]  2020    COLONOSCOPY SUBMUCOSAL/BOTOX INJECTION performed by Umesh Rosas MD at [address] [place] [address] Left     knee scoped and \"cleaned out\"       Social History:    Social History     Tobacco Use    Smoking status: Former Smoker     Packs/day: 0.50     Years: 20.00     Pack years: 10.00     Types: Cigarettes     Start date: 1984     Quit date: 1993     Years since quittin.4    Smokeless tobacco: Never Used   Substance Use Topics    Alcohol use: No                                Counseling given: Not Answered      Vital Signs (Current):   Vitals:    21 1414 21 0941   BP:  (!) 169/85   Pulse:  79   Resp:  17   Temp:  98.8 °F (37.1 °C)   TempSrc:  Oral   SpO2:  97%   Weight: 222 lb (100.7 kg) 222 lb (100.7 kg)   Height: 5' (1.524 m) 5' (1.524 m)                                              BP Readings from Last 3 Encounters:   21 (!) 169/85   21 139/74   20 139/81       NPO Status:                                                                                 BMI:   Wt Readings from Last 3 Encounters:   21 222 lb (100.7 kg)   21 222 lb (100.7 kg)   20 206 lb (93.4 kg)     Body mass index is 43.36 kg/m².     CBC:   Lab Results   Component Value Date    WBC 9.4 2021    RBC 4.53 2021    HGB 12.5 2021    HCT 38.5 2021    MCV 85.0 2021    RDW 15.4 2021     2021       CMP:   Lab Results   Component Value Date     02/22/2021    K 3.9 02/22/2021    CL 98 02/22/2021    CO2 26 02/22/2021    BUN 15 02/22/2021    CREATININE 0.9 02/22/2021    GFRAA >60 02/22/2021    GFRAA >60 05/14/2013    AGRATIO 1.1 03/30/2020    LABGLOM >60 02/22/2021    GLUCOSE 67 02/22/2021    PROT 8.3 03/30/2020    PROT 7.7 03/30/2012    CALCIUM 10.1 02/22/2021    BILITOT 0.9 03/30/2020    ALKPHOS 89 03/30/2020    AST 17 03/30/2020    ALT 9 03/30/2020       POC Tests: No results for input(s): POCGLU, POCNA, POCK, POCCL, POCBUN, POCHEMO, POCHCT in the last 72 hours. Coags:   Lab Results   Component Value Date    PROTIME 11.8 03/30/2020    INR 1.02 03/30/2020    APTT 31.5 03/30/2020       HCG (If Applicable): No results found for: PREGTESTUR, PREGSERUM, HCG, HCGQUANT     ABGs: No results found for: PHART, PO2ART, XHL6MFE, ELW6THE, BEART, W7PMHTEN     Type & Screen (If Applicable):  No results found for: LABABO, LABRH    Drug/Infectious Status (If Applicable):  No results found for: HIV, HEPCAB    COVID-19 Screening (If Applicable):   Lab Results   Component Value Date    COVID19 Not Detected 02/20/2021         Anesthesia Evaluation  Patient summary reviewed no history of anesthetic complications:   Airway: Mallampati: II  TM distance: >3 FB   Neck ROM: full  Mouth opening: > = 3 FB Dental: normal exam         Pulmonary:   (+) asthma:                            Cardiovascular:    (+) hypertension:,                   Neuro/Psych:                ROS comment: Glaucoma  Back pain GI/Hepatic/Renal:   (+) renal disease: CRI, morbid obesity (BMI 43)          Endo/Other:    (+) Diabetes, . Abdominal:           Vascular:                                        Anesthesia Plan      general     ASA 3       Induction: intravenous. Anesthetic plan and risks discussed with patient.                       Jovanni Madrigal MD   2/26/2021

## 2021-02-27 LAB
ALBUMIN SERPL-MCNC: 3 G/DL (ref 3.4–5)
ALBUMIN SERPL-MCNC: 3.1 G/DL (ref 3.4–5)
ANION GAP SERPL CALCULATED.3IONS-SCNC: 10 MMOL/L (ref 3–16)
ANION GAP SERPL CALCULATED.3IONS-SCNC: 11 MMOL/L (ref 3–16)
BASOPHILS ABSOLUTE: 0 K/UL (ref 0–0.2)
BASOPHILS ABSOLUTE: 0 K/UL (ref 0–0.2)
BASOPHILS RELATIVE PERCENT: 0.1 %
BASOPHILS RELATIVE PERCENT: 0.3 %
BUN BLDV-MCNC: 10 MG/DL (ref 7–20)
BUN BLDV-MCNC: 10 MG/DL (ref 7–20)
CALCIUM SERPL-MCNC: 8.1 MG/DL (ref 8.3–10.6)
CALCIUM SERPL-MCNC: 8.1 MG/DL (ref 8.3–10.6)
CHLORIDE BLD-SCNC: 100 MMOL/L (ref 99–110)
CHLORIDE BLD-SCNC: 100 MMOL/L (ref 99–110)
CO2: 23 MMOL/L (ref 21–32)
CO2: 23 MMOL/L (ref 21–32)
CREAT SERPL-MCNC: 0.9 MG/DL (ref 0.6–1.2)
CREAT SERPL-MCNC: 1 MG/DL (ref 0.6–1.2)
EOSINOPHILS ABSOLUTE: 0 K/UL (ref 0–0.6)
EOSINOPHILS ABSOLUTE: 0 K/UL (ref 0–0.6)
EOSINOPHILS RELATIVE PERCENT: 0 %
EOSINOPHILS RELATIVE PERCENT: 0 %
ESTIMATED AVERAGE GLUCOSE: 145.6 MG/DL
GFR AFRICAN AMERICAN: >60
GFR AFRICAN AMERICAN: >60
GFR NON-AFRICAN AMERICAN: 54
GFR NON-AFRICAN AMERICAN: >60
GLUCOSE BLD-MCNC: 194 MG/DL (ref 70–99)
GLUCOSE BLD-MCNC: 208 MG/DL (ref 70–99)
GLUCOSE BLD-MCNC: 218 MG/DL (ref 70–99)
GLUCOSE BLD-MCNC: 218 MG/DL (ref 70–99)
GLUCOSE BLD-MCNC: 220 MG/DL (ref 70–99)
GLUCOSE BLD-MCNC: 225 MG/DL (ref 70–99)
GLUCOSE BLD-MCNC: 230 MG/DL (ref 70–99)
HBA1C MFR BLD: 6.7 %
HCT VFR BLD CALC: 24.6 % (ref 36–48)
HCT VFR BLD CALC: 25.4 % (ref 36–48)
HCT VFR BLD CALC: 27.6 % (ref 36–48)
HEMOGLOBIN: 7.8 G/DL (ref 12–16)
HEMOGLOBIN: 8.2 G/DL (ref 12–16)
HEMOGLOBIN: 8.7 G/DL (ref 12–16)
INR BLD: 1.09 (ref 0.86–1.14)
LACTIC ACID: 1.9 MMOL/L (ref 0.4–2)
LACTIC ACID: 2.6 MMOL/L (ref 0.4–2)
LACTIC ACID: 3.5 MMOL/L (ref 0.4–2)
LACTIC ACID: 4.3 MMOL/L (ref 0.4–2)
LYMPHOCYTES ABSOLUTE: 1.4 K/UL (ref 1–5.1)
LYMPHOCYTES ABSOLUTE: 1.6 K/UL (ref 1–5.1)
LYMPHOCYTES RELATIVE PERCENT: 10 %
LYMPHOCYTES RELATIVE PERCENT: 13.7 %
MAGNESIUM: 2.1 MG/DL (ref 1.8–2.4)
MAGNESIUM: 2.4 MG/DL (ref 1.8–2.4)
MCH RBC QN AUTO: 27.8 PG (ref 26–34)
MCH RBC QN AUTO: 28.1 PG (ref 26–34)
MCH RBC QN AUTO: 28.3 PG (ref 26–34)
MCHC RBC AUTO-ENTMCNC: 31.5 G/DL (ref 31–36)
MCHC RBC AUTO-ENTMCNC: 31.6 G/DL (ref 31–36)
MCHC RBC AUTO-ENTMCNC: 32.4 G/DL (ref 31–36)
MCV RBC AUTO: 86.6 FL (ref 80–100)
MCV RBC AUTO: 88.5 FL (ref 80–100)
MCV RBC AUTO: 89.3 FL (ref 80–100)
MONOCYTES ABSOLUTE: 1.1 K/UL (ref 0–1.3)
MONOCYTES ABSOLUTE: 1.2 K/UL (ref 0–1.3)
MONOCYTES RELATIVE PERCENT: 8.4 %
MONOCYTES RELATIVE PERCENT: 9.8 %
NEUTROPHILS ABSOLUTE: 11.4 K/UL (ref 1.7–7.7)
NEUTROPHILS ABSOLUTE: 8.7 K/UL (ref 1.7–7.7)
NEUTROPHILS RELATIVE PERCENT: 76.2 %
NEUTROPHILS RELATIVE PERCENT: 81.5 %
PDW BLD-RTO: 14.7 % (ref 12.4–15.4)
PDW BLD-RTO: 15 % (ref 12.4–15.4)
PDW BLD-RTO: 15.3 % (ref 12.4–15.4)
PERFORMED ON: ABNORMAL
PHOSPHORUS: 3.7 MG/DL (ref 2.5–4.9)
PHOSPHORUS: 4.1 MG/DL (ref 2.5–4.9)
PLATELET # BLD: 198 K/UL (ref 135–450)
PLATELET # BLD: 259 K/UL (ref 135–450)
PLATELET # BLD: 271 K/UL (ref 135–450)
PMV BLD AUTO: 7.6 FL (ref 5–10.5)
PMV BLD AUTO: 7.7 FL (ref 5–10.5)
PMV BLD AUTO: 7.8 FL (ref 5–10.5)
POTASSIUM SERPL-SCNC: 3.8 MMOL/L (ref 3.5–5.1)
POTASSIUM SERPL-SCNC: 4.1 MMOL/L (ref 3.5–5.1)
PROTHROMBIN TIME: 12.7 SEC (ref 10–13.2)
RBC # BLD: 2.76 M/UL (ref 4–5.2)
RBC # BLD: 2.93 M/UL (ref 4–5.2)
RBC # BLD: 3.12 M/UL (ref 4–5.2)
SODIUM BLD-SCNC: 133 MMOL/L (ref 136–145)
SODIUM BLD-SCNC: 134 MMOL/L (ref 136–145)
WBC # BLD: 11.4 K/UL (ref 4–11)
WBC # BLD: 13.5 K/UL (ref 4–11)
WBC # BLD: 14 K/UL (ref 4–11)

## 2021-02-27 PROCEDURE — 2580000003 HC RX 258: Performed by: SURGERY

## 2021-02-27 PROCEDURE — 80069 RENAL FUNCTION PANEL: CPT

## 2021-02-27 PROCEDURE — 6360000002 HC RX W HCPCS: Performed by: SURGERY

## 2021-02-27 PROCEDURE — 36415 COLL VENOUS BLD VENIPUNCTURE: CPT

## 2021-02-27 PROCEDURE — 6370000000 HC RX 637 (ALT 250 FOR IP): Performed by: SURGERY

## 2021-02-27 PROCEDURE — 2580000003 HC RX 258: Performed by: STUDENT IN AN ORGANIZED HEALTH CARE EDUCATION/TRAINING PROGRAM

## 2021-02-27 PROCEDURE — 85027 COMPLETE CBC AUTOMATED: CPT

## 2021-02-27 PROCEDURE — 94640 AIRWAY INHALATION TREATMENT: CPT

## 2021-02-27 PROCEDURE — 6370000000 HC RX 637 (ALT 250 FOR IP): Performed by: STUDENT IN AN ORGANIZED HEALTH CARE EDUCATION/TRAINING PROGRAM

## 2021-02-27 PROCEDURE — 1200000000 HC SEMI PRIVATE

## 2021-02-27 PROCEDURE — 83735 ASSAY OF MAGNESIUM: CPT

## 2021-02-27 PROCEDURE — 94799 UNLISTED PULMONARY SVC/PX: CPT

## 2021-02-27 PROCEDURE — 94669 MECHANICAL CHEST WALL OSCILL: CPT

## 2021-02-27 PROCEDURE — 94150 VITAL CAPACITY TEST: CPT

## 2021-02-27 PROCEDURE — 83605 ASSAY OF LACTIC ACID: CPT

## 2021-02-27 PROCEDURE — 6360000002 HC RX W HCPCS: Performed by: STUDENT IN AN ORGANIZED HEALTH CARE EDUCATION/TRAINING PROGRAM

## 2021-02-27 PROCEDURE — 85025 COMPLETE CBC W/AUTO DIFF WBC: CPT

## 2021-02-27 RX ORDER — SODIUM CHLORIDE, SODIUM GLUCONATE, SODIUM ACETATE, POTASSIUM CHLORIDE AND MAGNESIUM CHLORIDE 526; 502; 368; 37; 30 MG/100ML; MG/100ML; MG/100ML; MG/100ML; MG/100ML
INJECTION, SOLUTION INTRAVENOUS CONTINUOUS
Status: DISCONTINUED | OUTPATIENT
Start: 2021-02-28 | End: 2021-02-28

## 2021-02-27 RX ORDER — METOCLOPRAMIDE HYDROCHLORIDE 5 MG/ML
5 INJECTION INTRAMUSCULAR; INTRAVENOUS ONCE
Status: DISCONTINUED | OUTPATIENT
Start: 2021-02-27 | End: 2021-02-27

## 2021-02-27 RX ORDER — SODIUM CHLORIDE, SODIUM LACTATE, POTASSIUM CHLORIDE, AND CALCIUM CHLORIDE .6; .31; .03; .02 G/100ML; G/100ML; G/100ML; G/100ML
500 INJECTION, SOLUTION INTRAVENOUS ONCE
Status: COMPLETED | OUTPATIENT
Start: 2021-02-27 | End: 2021-02-27

## 2021-02-27 RX ORDER — METOCLOPRAMIDE HYDROCHLORIDE 5 MG/ML
5 INJECTION INTRAMUSCULAR; INTRAVENOUS EVERY 6 HOURS
Status: DISCONTINUED | OUTPATIENT
Start: 2021-02-27 | End: 2021-03-05

## 2021-02-27 RX ORDER — THIAMINE HYDROCHLORIDE 100 MG/ML
100 INJECTION, SOLUTION INTRAMUSCULAR; INTRAVENOUS ONCE
Status: DISCONTINUED | OUTPATIENT
Start: 2021-02-27 | End: 2021-02-27 | Stop reason: SDUPTHER

## 2021-02-27 RX ORDER — INSULIN LISPRO 100 [IU]/ML
0-18 INJECTION, SOLUTION INTRAVENOUS; SUBCUTANEOUS
Status: DISCONTINUED | OUTPATIENT
Start: 2021-02-27 | End: 2021-03-05 | Stop reason: HOSPADM

## 2021-02-27 RX ORDER — INSULIN LISPRO 100 [IU]/ML
0-9 INJECTION, SOLUTION INTRAVENOUS; SUBCUTANEOUS NIGHTLY
Status: DISCONTINUED | OUTPATIENT
Start: 2021-02-27 | End: 2021-03-05 | Stop reason: HOSPADM

## 2021-02-27 RX ORDER — SODIUM CHLORIDE, SODIUM GLUCONATE, SODIUM ACETATE, POTASSIUM CHLORIDE AND MAGNESIUM CHLORIDE 526; 502; 368; 37; 30 MG/100ML; MG/100ML; MG/100ML; MG/100ML; MG/100ML
INJECTION, SOLUTION INTRAVENOUS CONTINUOUS
Status: DISPENSED | OUTPATIENT
Start: 2021-02-27 | End: 2021-02-27

## 2021-02-27 RX ORDER — SODIUM CHLORIDE, SODIUM GLUCONATE, SODIUM ACETATE, POTASSIUM CHLORIDE AND MAGNESIUM CHLORIDE 526; 502; 368; 37; 30 MG/100ML; MG/100ML; MG/100ML; MG/100ML; MG/100ML
INJECTION, SOLUTION INTRAVENOUS CONTINUOUS
Status: DISCONTINUED | OUTPATIENT
Start: 2021-02-27 | End: 2021-02-27

## 2021-02-27 RX ADMIN — METOCLOPRAMIDE HYDROCHLORIDE 5 MG: 5 INJECTION INTRAMUSCULAR; INTRAVENOUS at 18:44

## 2021-02-27 RX ADMIN — POTASSIUM BICARBONATE 20 MEQ: 782 TABLET, EFFERVESCENT ORAL at 10:37

## 2021-02-27 RX ADMIN — METOCLOPRAMIDE HYDROCHLORIDE 5 MG: 5 INJECTION INTRAMUSCULAR; INTRAVENOUS at 08:17

## 2021-02-27 RX ADMIN — ALBUTEROL SULFATE 2.5 MG: 2.5 SOLUTION RESPIRATORY (INHALATION) at 20:43

## 2021-02-27 RX ADMIN — Medication 10 ML: at 22:22

## 2021-02-27 RX ADMIN — ACETAMINOPHEN 1000 MG: 500 TABLET, COATED ORAL at 22:19

## 2021-02-27 RX ADMIN — SODIUM CHLORIDE, SODIUM GLUCONATE, SODIUM ACETATE, POTASSIUM CHLORIDE AND MAGNESIUM CHLORIDE: 526; 502; 368; 37; 30 INJECTION, SOLUTION INTRAVENOUS at 13:39

## 2021-02-27 RX ADMIN — SODIUM CHLORIDE, POTASSIUM CHLORIDE, SODIUM LACTATE AND CALCIUM CHLORIDE: 600; 310; 30; 20 INJECTION, SOLUTION INTRAVENOUS at 02:52

## 2021-02-27 RX ADMIN — THIAMINE HYDROCHLORIDE 100 MG: 100 INJECTION, SOLUTION INTRAMUSCULAR; INTRAVENOUS at 14:34

## 2021-02-27 RX ADMIN — ACETAMINOPHEN 1000 MG: 500 TABLET, COATED ORAL at 14:34

## 2021-02-27 RX ADMIN — SODIUM CHLORIDE, POTASSIUM CHLORIDE, SODIUM LACTATE AND CALCIUM CHLORIDE: 600; 310; 30; 20 INJECTION, SOLUTION INTRAVENOUS at 11:13

## 2021-02-27 RX ADMIN — METOCLOPRAMIDE HYDROCHLORIDE 5 MG: 5 INJECTION INTRAMUSCULAR; INTRAVENOUS at 14:35

## 2021-02-27 RX ADMIN — ACETAMINOPHEN 1000 MG: 500 TABLET, COATED ORAL at 02:52

## 2021-02-27 RX ADMIN — OXYCODONE 5 MG: 5 TABLET ORAL at 06:44

## 2021-02-27 RX ADMIN — METOPROLOL TARTRATE 25 MG: 25 TABLET, FILM COATED ORAL at 08:17

## 2021-02-27 RX ADMIN — INSULIN LISPRO 3 UNITS: 100 INJECTION, SOLUTION INTRAVENOUS; SUBCUTANEOUS at 22:12

## 2021-02-27 RX ADMIN — INSULIN LISPRO 6 UNITS: 100 INJECTION, SOLUTION INTRAVENOUS; SUBCUTANEOUS at 18:05

## 2021-02-27 RX ADMIN — INSULIN LISPRO 4 UNITS: 100 INJECTION, SOLUTION INTRAVENOUS; SUBCUTANEOUS at 08:18

## 2021-02-27 RX ADMIN — Medication 10 ML: at 08:25

## 2021-02-27 RX ADMIN — SODIUM CHLORIDE, SODIUM GLUCONATE, SODIUM ACETATE, POTASSIUM CHLORIDE AND MAGNESIUM CHLORIDE: 526; 502; 368; 37; 30 INJECTION, SOLUTION INTRAVENOUS at 17:41

## 2021-02-27 RX ADMIN — ALBUTEROL SULFATE 2.5 MG: 2.5 SOLUTION RESPIRATORY (INHALATION) at 07:56

## 2021-02-27 RX ADMIN — ENOXAPARIN SODIUM 40 MG: 40 INJECTION SUBCUTANEOUS at 08:17

## 2021-02-27 RX ADMIN — INSULIN LISPRO 4 UNITS: 100 INJECTION, SOLUTION INTRAVENOUS; SUBCUTANEOUS at 13:28

## 2021-02-27 RX ADMIN — ACETAMINOPHEN 1000 MG: 500 TABLET, COATED ORAL at 08:17

## 2021-02-27 RX ADMIN — SODIUM CHLORIDE, POTASSIUM CHLORIDE, SODIUM LACTATE AND CALCIUM CHLORIDE 500 ML: 600; 310; 30; 20 INJECTION, SOLUTION INTRAVENOUS at 01:03

## 2021-02-27 RX ADMIN — ALBUTEROL SULFATE 2.5 MG: 2.5 SOLUTION RESPIRATORY (INHALATION) at 15:17

## 2021-02-27 ASSESSMENT — PAIN SCALES - GENERAL
PAINLEVEL_OUTOF10: 3
PAINLEVEL_OUTOF10: 7
PAINLEVEL_OUTOF10: 2
PAINLEVEL_OUTOF10: 0

## 2021-02-27 ASSESSMENT — PAIN DESCRIPTION - DESCRIPTORS: DESCRIPTORS: ACHING

## 2021-02-27 ASSESSMENT — PAIN DESCRIPTION - ORIENTATION: ORIENTATION: RIGHT;LEFT

## 2021-02-27 NOTE — PROGRESS NOTES
Dr Marla Whitten notified of elevated lactic acid and urine output of 20mls int ha last hour.  See orders

## 2021-02-27 NOTE — PROGRESS NOTES
RESPIRATORY THERAPY ASSESSMENT    Name:  Carmela Adams [place] Record Number:  [id number]  Age: 65 y.o. Gender: female  : 1950  Today's Date:  2021  Room:  [address] [place] [address]-    Assessment     Is the patient being admitted for a COPD or Asthma exacerbation? No   (If yes the patient will be seen every 4 hours for the first 24 hours and then reassessed)    Patient Admission Diagnosis      Allergies  Allergies   Allergen Reactions    Pantoprazole Sodium Hives    Bactrim [Sulfamethoxazole-Trimethoprim]      ITCHING HIVES, NO RESPIRATORY SXS    Enalapril Swelling     angioedema    Lisinopril Swelling     Lip swelling/angioedema    Milk-Related Compounds Other (See Comments)     Caused GI bleeding (cow's milk)    Pcn [Penicillins] Hives    Tape [Adhesive Tape] Other (See Comments)     Paper tape causes redness, irritation       Minimum Predicted Vital Capacity:     680           Actual Vital Capacity:      500              Pulmonary History:Asthma  Home Oxygen Therapy:  room air  Home Respiratory Therapy:Albuterol and Tiotropium bromide/Olodaterol   Current Respiratory Therapy:  Albuterol HHN PRN, albuterol HHN TID  Treatment Type: Vibratory mucous clearing therapy or intervention  Medications: Albuterol    Respiratory Severity Index(RSI)   Patients with orders for inhalation medications, oxygen, or any therapeutic treatment modality will be placed on Respiratory Protocol. They will be assessed with the first treatment and at least every 72 hours thereafter. The following severity scale will be used to determine frequency of treatment intervention.     Smoking History: Pulmonary Disease or Smoking History, Greater than 15 pack year = 2 (10 pack year smoking history)  Social History  Social History     Tobacco Use    Smoking status: Former Smoker     Packs/day: 0.50     Years: 20.00     Pack years: 10.00     Types: Cigarettes     Start date: 1984     Quit date: 1993     Years since quittin.4    Smokeless tobacco: Never Used   Substance Use Topics    Alcohol use: No    Drug use: No       Recent Surgical History: Lower Abdominal = 2  Past Surgical History  Past Surgical History:   Procedure Laterality Date    COLONOSCOPY  2014    polyp removed    COLONOSCOPY  2020    COLONOSCOPY POLYPECTOMY SNARE/COLD BIOPSY performed by Iva Jeans, MD at [facility] COLONOSCOPY  2020    COLONOSCOPY SUBMUCOSAL/BOTOX INJECTION performed by Iva Jeans, MD at [address] Left     knee scoped and \"cleaned out\"       Level of Consciousness: Alert, Oriented, and Cooperative = 0    Level of Activity: Walking with assistance = 1    Respiratory Pattern: Regular Pattern; RR 8-20 = 0    Breath Sounds: Clear = 0    Sputum   ,  ,    Cough: Strong, spontaneous, non-productive = 0    Vital Signs   BP (!) 94/55   Pulse 95   Temp 97.6 °F (36.4 °C) (Oral)   Resp 18   Ht 5' (1.524 m)   Wt 222 lb (100.7 kg)   SpO2 98%   BMI 43.36 kg/m²   SPO2 (COPD values may differ): 90-91% on room air or greater than 92% on FiO2 24- 28% = 1    Peak Flow (asthma only): not applicable = 0    RSI: 5-6 = Q4hr PRN (every four hours as needed) for dyspnea        Plan       Goals: medication delivery and improve oxygenation    Patient/caregiver was educated on the proper method of use for Respiratory Care Devices:  Yes      Level of patient/caregiver understanding able to:   ? Verbalize understanding   ? Demonstrate understanding       ? Teach back        ? Needs reinforcement       ? No available caregiver               ? Other:     Response to education:  Good     Is patient being placed on Home Treatment Regimen? Yes     Does the patient have everything they need prior to discharge?   NA     Comments: pt not currently in respiratory distress    Plan of Care: albuterol HHN PRN, albuterol HHN TID, EZPAP and Acapella daily, Respimat MDI equivalent

## 2021-02-27 NOTE — PLAN OF CARE
Problem: Falls - Risk of:  Goal: Will remain free from falls  Description: Will remain free from falls  Outcome: Ongoing  Note: Call light within reach; bed/chair alarm engaged     Problem: Pain:  Goal: Pain level will decrease  Description: Pain level will decrease  Outcome: Ongoing  Note: Patient alerts RN of increasing pain; RN administers pain medication as ordered

## 2021-02-27 NOTE — OP NOTE
[facility] [address] [address], [address]                                OPERATIVE REPORT    PATIENT NAME: Jorge Chapa                  :        1950  MED REC NO:   [id number]                          ROOM:       [id number]  ACCOUNT NO:   [de-identified]                           ADMIT DATE: 2021  PROVIDER:     Bea Zeng. Astrid Tavarez MD    DATE OF PROCEDURE:  2021    SURGEON:  Bea Zeng. Astrid Tavarez MD    ASSISTANT:  Kaitlyn A. Heriberto Collet, PGY-2, resident. PREOPERATIVE DIAGNOSIS:  Descending colon cancer. POSTOPERATIVE DIAGNOSIS:  Descending colon cancer. OPERATION PERFORMED:  1.  Robotic-assisted laparoscopic left colectomy with colocolonic anastomosis. 2.  Laparoscopic mobilization of splenic flexure. ANESTHESIA:  General endotracheal.    COMPLICATIONS:  Intraabdominal bleeding requiring one unit of packed red  blood cells. SPECIMEN:  Left colectomy. ESTIMATED BLOOD LOSS:  800 mL. INDICATIONS:  The patient is a 40-year-old female. She has multiple  medical comorbidities. She actually underwent a colonoscopy over one  year ago and was found to have a large polyp. The final pathology did  show adenocarcinoma. She underwent staging showing no evidence of  metastatic disease. She was then referred to me and I had consultation  with her in the office. I recommended that we proceed with partial  colectomy. She was going to be scheduled for surgery and then the  COVID-19 pandemic started and she decided to delay the surgery for a  couple of months. I thought that this is perfectly reasonable as her  tumor was essentially asymptomatic and very small and was only found in  a polypectomy specimen. I then reached out to the patient to schedule  surgery a couple of months later but then she had rib fractures and  decided that she wanted to delay even further.   I told her to call me back when she is ready to schedule for surgery. I had not heard back  from a while so I had GI scope her again and again they confirmed the  area of the polypectomy site with no interval growth but continued  concern for residual tissue. Eventually, she wanted to proceed with the  surgery at her schedule. I had a long discussion with her regarding the  risks, benefits, and alternatives of the procedure, including risks of  bleeding, infection, anastomotic leak, hernia formation, need for  additional operations, damage to intraabdominal structures including but  not limited to bowel, bladder, ureters as well as neurovascular  structures. She understood the potential need for open operation  potential ostomy. She understood she is at high risk for complications  given her multiple medical problems including morbid obesity with BMI of  43, her history of diabetes. She understood of the risks and agreed to  proceed. As it had actually been a while since I spoke to her in the  office, I discussed all of these risks again with her in the  preoperative area and she agreed to proceed. PROCEDURE DETAILS:  The patient was brought to the operating theater,  placed supine on the operating table. General endotracheal intubation  was performed by Anesthesiology. The patient was placed in the  lithotomy position. Vee catheter was placed revealing clear yellow  urine. Her abdomen was prepped and draped in the usual sterile fashion  using chlorhexidine prep solution. Her rectum was washed out using  Betadine and saline solution. Timeout was performed confirming the  patient's identity as well as the operative site. Antibiotics were  confirmed to be perfusing. All safety points were followed. SCDs were  on and functioning. Lovenox was confirmed and given.     I began by making an incision in left upper quadrant mid clavicular  line, Romeo's point, 0 degree 5 mm laparoscope used in the intraabdominal cavity in a direct Optiview fashion. Abdomen was entered  without complications and insufflated to 15 mmHg. Upon initial entry,  we viewed the abdomen and we noted no evidence of metastatic disease. The bilateral lobes of the liver were noted to be clean on the surface. I could not locate easily the site of the tattoo. I reviewed the  colonoscopy report once again and confirmed that the tattoo should be  located to about 50 cm from the anal verge in the descending colon. I  decided to go ahead and place another trocar in the right mid abdomen as  well as a third trocar in the epigastric region. Using these  laparoscopic ports, the patient was then placed in left-sided up  positioning and I started by inspecting the colon from the rectosigmoid  junction all the way to the descending colon. This was actually quite  difficult just to inspect it as she had a lot of small bowel given her  intraabdominal obesity. The visualization was quite difficult. Eventually I took down some of the omentum that was stuck in the left  upper quadrant using the LigaSure device and started mobilizing small  part of the descending colon. At this point, I noted that the tattoo  was in the proximal descending colon. Given this information, we chose  the decision to place other robotic ports on the right side of the  abdomen in a vertical straight line fashion. These were 8 mm ports x4. The [employer] robot was then docked in usual fashion. I began  attempting a lateral to middle dissection of the sigmoid colon. However, given again the intraabdominal obesity as well as the small  bowel that continued to get in the visual field. I decided to go ahead  and start at the transverse colon site. Transverse colon was fully   from the omentum. I started in the mid transverse colon and  continued along to the splenic flexure.   I was able to partially transverse colon would reach over to the left side of the abdomen for  anastomosis. I then continued taking down the mesentery. I noted small  amount of oozing in the abdominal cavity but deemed that this was likely  from the difficult dissection robotic and laparoscopically. I then  chose the area of distal transection at least 5 cm distal to the edge of  the tattoo and I did mention that the proximal transection site was 5 cm  proximal to the proximal edge of the tattoo. The midline insertion was  taken down using LigaSure device. The specimen was labeled as left  colectomy and passed off the table. I opened onto the back table and  confirmed that the entirety of the tattoo was within the specimen. I  could not locate the actual biopsy site but I again reviewed the  colonoscopy report and it was ensured that given that the tattoo was  located just adjacent to the polyp site, given good 5 cm margins at  least on either side that I had to completely remove the whatever  remaining adenomatous tissue. This will be sent down for an  examination. I then lined up for an anastomosis. Given my splenic flexure  mobilization, I was able to line up for a side-to-side colocolonic  anastomosis. The colon was stabilized and excised. ELSA 75 blue load  stapler was passed into the end. Antimesenteric borders were then lined  up and staples were then fired creating a stapled side-to-side  functional end-to-end colocolonic anastomosis. The stapler was then  removed and the common colotomy was then closed using additional ELSA-75  blue load stapler. The anastomosis was placed back into the abdominal  cavity. It was noted to be tension free, well perfused and not twisted. I then closed the peritoneum of the extraction site after the entirety  of the team and then changed the gloves and gowns as necessary and we  used clean closing instruments.   The peritoneum was attempted to be closed using a running 0 Vicryl suture. There was a couple of tears as  we were closing this given the fragility of her poor tissue. Eventually  we were able to close the posterior layer. The anterior fascia was then  closed using an 0 PDS suture x2 and secured in the middle. We went back  in laparoscopically to ensure good fascial closure, which was completely  intact. However when we then looked down to view the anastomosis, we  noted that there was fair amount of blood that was pooling in the left  upper quadrant. This appeared to be bright red blood. I then used  aggressive suctioning and irrigating the left upper quadrant. I did not  think that it was coming from the spleen. I deemed that was  likely  from the mesenteric transection as there may have been a small tear in  the mesentery during the extraction of the extracorporealization of the  colon given her foreshortened mesentery and her morbid obesity. I did  confirm that it was a bleeding mesenteric vessel likely a branch of the  terminal aspect of the left colic. I attempted to LigaSure this. However it actually caused worsening bleeding. At that point, I decided  we had lost probably about 400 to 500 mL of blood so I decided at that  point to just pack that portion. A Ray-Marylou was passed into the  abdominal cavity and laparoscopically packed the area and held pressure  and suctioned the remaining clots and blood from around the area. I  then called in for one of my partners to assist me. Dr. Ashutosh Jasso,  General Surgery scrubbed into the case and assisted me at this point. We undid the pack again and noted the active bleeding. Anesthesia was  notified and they decided to go ahead and give 1 unit of blood given  some mild hypotension. We talked to Dr. Flaquito Howard itself and we were able  to ligate the bleeder using LigaSure device in couple of different  areas.   We ensured that it was completely ligated and there was no other

## 2021-02-27 NOTE — PROGRESS NOTES
Interval Progress     The patient was re-examined at the bedside. She reports no new abdominal pain, remains hemodynamically stable with mild tachycardia. The patient's abdomen remains soft and appropriately tender. There is no ecchymosis or any signs of hemorrhage. Urine output has improved with additional plasmalyte bolus this afternoon. Afternoon CBC revealed drift of Hgb to 7.8 from 8.2, though platelets have also decreased in the setting of additional fluids suggestive of dilution. Awaiting results of repeat lactic acid.  Will continue to monitor clinically, trend lactate with q6h draws until clearance, and repeat CBC along with renal panel in the Sarthak Storey MD, MPH  PGY-2 General Surgery  02/27/21  5:43 PM

## 2021-02-27 NOTE — PROGRESS NOTES
General Surgery   Daily Progress Note  Patient: Faiza Richard      CC: Robotic, hand assisted laparoscopic partial colectomy. SUBJECTIVE:   Patient rested well overnight, her lactate levels were elevated and she was making low urine output. Thus a total of 1500 IVF bolus was given, patient UOP improved, as did her lactate. The patient is complain of some abdominal soreness that is controlled with PO pain medicine. ROS:   A 14 point review of systems was conducted, significant findings as noted above. All other systems negative. OBJECTIVE:    PHYSICAL EXAM:    Vitals:    02/26/21 2118 02/26/21 2224 02/26/21 2303 02/27/21 0320   BP:   (!) 97/59 117/68   Pulse:   88 97   Resp:  18 18 18   Temp:   97.4 °F (36.3 °C) 97.6 °F (36.4 °C)   TempSrc:   Oral Oral   SpO2: 98% 98% 98% 97%   Weight:       Height:           General appearance: alert, no acute distress, grooming appropriate  Eyes: No scleral icterus, EOM grossly intact  Neck: trachea midline, no JVD, no lymphadenopathy, neck supple  Chest/Lungs: CTAB, no crackles/rales, wheezes/rhonchi, normal effort with no accessory muscle use on 2L NC  Cardiovascular: RRR, no murmurs/gallops/rubs, S1 and S2 noted, well perfused  Abdomen: Soft, appropriately-tender, incisions c/d/i and well approximated with Dermabond, patient with one larger incision on the left lateral upper quadrant of the abdomen, also approximated and dressed with dermabond. Skin: warm and dry, no rashes  Extremities: no edema, no cyanosis  Genitourinary:  Vee in place with clear yellow urine  Neuro: A&Ox3, no focal deficits, sensation intact    LABS:   Recent Labs     02/26/21 2358 02/27/21  0513   WBC 14.0* 11.4*   HGB 8.7* 8.2*   HCT 27.6* 25.4*   MCV 88.5 86.6    259        Recent Labs     02/26/21 2358 02/27/21  0513   * 133*   K 4.1 3.8    100   CO2 23 23   PHOS 4.1 3.7   BUN 10 10   CREATININE 1.0 0.9      No results for input(s): AST, ALT, ALB, BILIDIR, BILITOT, ALKPHOS in the last 72 hours. No results for input(s): LIPASE, AMYLASE in the last 72 hours. Recent Labs     02/26/21  2358   INR 1.09      No results for input(s): CKTOTAL, CKMB, CKMBINDEX, TROPONINI in the last 72 hours.       ASSESSMENT & PLAN:   This is a 65y.o. year old female status post Robotic, hand assisted laparoscopic partial colectomy secondary to cancer of the descending colon (2/26) POD1    - Continue fluids until normalization of lactic acid  - Continue wood for strict I&O's, will likely remove in PM  - On home Lopressor (25mg BID)  - General diet  - Lovenox started  - Encourage ambulation and OOB    Shelby Barber DO, MS  PGY1, General Surgery  02/27/21  7:08 AM  [phone]

## 2021-02-27 NOTE — PROGRESS NOTES
General Surgery  Post-operative Note      Procedure(s) Performed: Robotic, hand assisted laparoscopic partial colectomy    Subjective:   Patient is somnolent, she is complaining of some mild soreness in her abdomen. Otherwise wood is in place, urine output is low. The patient has not had anything to eat or drink at this time. She denies passing any flatus or having a bowel movement. Objective:  Anesthesia type: General      I/O    Intra op    Post op     Fluids  3900 mL 1800 mL      mL 0 mL     Urine 140 mL 250 mL     Vitals:   Vitals:    02/26/21 2000 02/26/21 2049 02/26/21 2118 02/26/21 2224   BP: 101/66 (!) 94/55     Pulse: 98 95     Resp: 22 20  18   Temp: 99.5 °F (37.5 °C) 97.6 °F (36.4 °C)     TempSrc: Temporal Oral     SpO2: 100% (!) 89% 98% 98%   Weight:       Height:           Physical Exam:  Post-op vital signs:  Stable   General appearance: alert, no acute distress, grooming appropriate  Eyes: No scleral icterus, EOM grossly intact  Neck: trachea midline, no JVD, no lymphadenopathy, neck supple  Chest/Lungs: CTAB, no crackles/rales, wheezes/rhonchi, normal effort with no accessory muscle use on 2L NC  Cardiovascular: RRR, no murmurs/gallops/rubs, S1 and S2 noted, well perfused  Abdomen: Soft, appropriately-tender, incisions c/d/i and well approximated with Dermabond, patient with one larger incision on the left lateral upper quadrant of the abdomen, also approximated and dressed with dermabond. Skin: warm and dry, no rashes  Extremities: no edema, no cyanosis  Genitourinary: Wood in place with clear yellow urine  Neuro: A&Ox3, no focal deficits, sensation intact    Assessment and Plan  This is a 65y.o. year old female status post Robotic, hand assisted laparoscopic partial colectomy secondary to cancer of the descending colon. (2/26) POD0.     Pain management: Roxicodone pain panel, scheduled tylenol and Robaxin PRN  Cardiovasc: hemodynamically stable, will continue to monitor  Respiratory: IS ordered to bedside, encourage hourly IS and deep breathing, wean oxygen as tolerated  Fluids:   cc/hour, Diet: Clear liquid diet  : Urine output is adequate   Ambulation: OOB to chair, encourage ambulation  Prophylaxis: SCDs, lovenox  Antibiotics: Patient received flagyl and Levaquin in the perioperative period.    Wound: Local wound care      Carli Hutchinson DO, [address]  PGY1, General Surgery  02/26/21  10:48 PM  [phone]

## 2021-02-28 LAB
ALBUMIN SERPL-MCNC: 2.8 G/DL (ref 3.4–5)
ANION GAP SERPL CALCULATED.3IONS-SCNC: 11 MMOL/L (ref 3–16)
BASOPHILS ABSOLUTE: 0.1 K/UL (ref 0–0.2)
BASOPHILS ABSOLUTE: 0.1 K/UL (ref 0–0.2)
BASOPHILS RELATIVE PERCENT: 0.4 %
BASOPHILS RELATIVE PERCENT: 0.7 %
BUN BLDV-MCNC: 8 MG/DL (ref 7–20)
CALCIUM SERPL-MCNC: 8.2 MG/DL (ref 8.3–10.6)
CHLORIDE BLD-SCNC: 99 MMOL/L (ref 99–110)
CO2: 24 MMOL/L (ref 21–32)
CREAT SERPL-MCNC: 0.9 MG/DL (ref 0.6–1.2)
EOSINOPHILS ABSOLUTE: 0 K/UL (ref 0–0.6)
EOSINOPHILS ABSOLUTE: 0.1 K/UL (ref 0–0.6)
EOSINOPHILS RELATIVE PERCENT: 0.2 %
EOSINOPHILS RELATIVE PERCENT: 0.6 %
GFR AFRICAN AMERICAN: >60
GFR NON-AFRICAN AMERICAN: >60
GLUCOSE BLD-MCNC: 164 MG/DL (ref 70–99)
GLUCOSE BLD-MCNC: 174 MG/DL (ref 70–99)
GLUCOSE BLD-MCNC: 205 MG/DL (ref 70–99)
GLUCOSE BLD-MCNC: 213 MG/DL (ref 70–99)
GLUCOSE BLD-MCNC: 219 MG/DL (ref 70–99)
GLUCOSE BLD-MCNC: 263 MG/DL (ref 70–99)
HCT VFR BLD CALC: 21.4 % (ref 36–48)
HCT VFR BLD CALC: 23.4 % (ref 36–48)
HEMOGLOBIN: 7.1 G/DL (ref 12–16)
HEMOGLOBIN: 7.4 G/DL (ref 12–16)
LYMPHOCYTES ABSOLUTE: 1.7 K/UL (ref 1–5.1)
LYMPHOCYTES ABSOLUTE: 2.1 K/UL (ref 1–5.1)
LYMPHOCYTES RELATIVE PERCENT: 12.4 %
LYMPHOCYTES RELATIVE PERCENT: 12.8 %
MAGNESIUM: 2.2 MG/DL (ref 1.8–2.4)
MCH RBC QN AUTO: 28.3 PG (ref 26–34)
MCH RBC QN AUTO: 28.9 PG (ref 26–34)
MCHC RBC AUTO-ENTMCNC: 31.6 G/DL (ref 31–36)
MCHC RBC AUTO-ENTMCNC: 33.2 G/DL (ref 31–36)
MCV RBC AUTO: 87.2 FL (ref 80–100)
MCV RBC AUTO: 89.5 FL (ref 80–100)
MONOCYTES ABSOLUTE: 0.8 K/UL (ref 0–1.3)
MONOCYTES ABSOLUTE: 1.3 K/UL (ref 0–1.3)
MONOCYTES RELATIVE PERCENT: 5 %
MONOCYTES RELATIVE PERCENT: 9.1 %
NEUTROPHILS ABSOLUTE: 10.8 K/UL (ref 1.7–7.7)
NEUTROPHILS ABSOLUTE: 13.3 K/UL (ref 1.7–7.7)
NEUTROPHILS RELATIVE PERCENT: 77.9 %
NEUTROPHILS RELATIVE PERCENT: 80.9 %
PDW BLD-RTO: 15.3 % (ref 12.4–15.4)
PDW BLD-RTO: 15.5 % (ref 12.4–15.4)
PERFORMED ON: ABNORMAL
PHOSPHORUS: 2.3 MG/DL (ref 2.5–4.9)
PLATELET # BLD: 213 K/UL (ref 135–450)
PLATELET # BLD: 255 K/UL (ref 135–450)
PMV BLD AUTO: 7.8 FL (ref 5–10.5)
PMV BLD AUTO: 7.8 FL (ref 5–10.5)
POTASSIUM SERPL-SCNC: 4.2 MMOL/L (ref 3.5–5.1)
RBC # BLD: 2.45 M/UL (ref 4–5.2)
RBC # BLD: 2.62 M/UL (ref 4–5.2)
SODIUM BLD-SCNC: 134 MMOL/L (ref 136–145)
WBC # BLD: 13.8 K/UL (ref 4–11)
WBC # BLD: 16.4 K/UL (ref 4–11)

## 2021-02-28 PROCEDURE — 6370000000 HC RX 637 (ALT 250 FOR IP): Performed by: SURGERY

## 2021-02-28 PROCEDURE — 83735 ASSAY OF MAGNESIUM: CPT

## 2021-02-28 PROCEDURE — 6360000002 HC RX W HCPCS: Performed by: STUDENT IN AN ORGANIZED HEALTH CARE EDUCATION/TRAINING PROGRAM

## 2021-02-28 PROCEDURE — 94640 AIRWAY INHALATION TREATMENT: CPT

## 2021-02-28 PROCEDURE — 2580000003 HC RX 258: Performed by: STUDENT IN AN ORGANIZED HEALTH CARE EDUCATION/TRAINING PROGRAM

## 2021-02-28 PROCEDURE — 94669 MECHANICAL CHEST WALL OSCILL: CPT

## 2021-02-28 PROCEDURE — 94761 N-INVAS EAR/PLS OXIMETRY MLT: CPT

## 2021-02-28 PROCEDURE — 6370000000 HC RX 637 (ALT 250 FOR IP): Performed by: STUDENT IN AN ORGANIZED HEALTH CARE EDUCATION/TRAINING PROGRAM

## 2021-02-28 PROCEDURE — 85025 COMPLETE CBC W/AUTO DIFF WBC: CPT

## 2021-02-28 PROCEDURE — 6360000002 HC RX W HCPCS: Performed by: SURGERY

## 2021-02-28 PROCEDURE — 80069 RENAL FUNCTION PANEL: CPT

## 2021-02-28 PROCEDURE — 36415 COLL VENOUS BLD VENIPUNCTURE: CPT

## 2021-02-28 PROCEDURE — 2500000003 HC RX 250 WO HCPCS: Performed by: STUDENT IN AN ORGANIZED HEALTH CARE EDUCATION/TRAINING PROGRAM

## 2021-02-28 PROCEDURE — 1200000000 HC SEMI PRIVATE

## 2021-02-28 PROCEDURE — 2580000003 HC RX 258: Performed by: SURGERY

## 2021-02-28 PROCEDURE — 2700000000 HC OXYGEN THERAPY PER DAY

## 2021-02-28 RX ORDER — LANOLIN ALCOHOL/MO/W.PET/CERES
3 CREAM (GRAM) TOPICAL NIGHTLY PRN
Status: DISCONTINUED | OUTPATIENT
Start: 2021-02-28 | End: 2021-03-05 | Stop reason: HOSPADM

## 2021-02-28 RX ADMIN — ALBUTEROL SULFATE 2.5 MG: 2.5 SOLUTION RESPIRATORY (INHALATION) at 21:33

## 2021-02-28 RX ADMIN — METOPROLOL TARTRATE 25 MG: 25 TABLET, FILM COATED ORAL at 08:14

## 2021-02-28 RX ADMIN — ACETAMINOPHEN 1000 MG: 500 TABLET, COATED ORAL at 15:09

## 2021-02-28 RX ADMIN — ACETAMINOPHEN 1000 MG: 500 TABLET, COATED ORAL at 20:51

## 2021-02-28 RX ADMIN — Medication 10 ML: at 08:17

## 2021-02-28 RX ADMIN — ALBUTEROL SULFATE 2.5 MG: 2.5 SOLUTION RESPIRATORY (INHALATION) at 08:02

## 2021-02-28 RX ADMIN — INSULIN LISPRO 6 UNITS: 100 INJECTION, SOLUTION INTRAVENOUS; SUBCUTANEOUS at 17:44

## 2021-02-28 RX ADMIN — METOPROLOL TARTRATE 25 MG: 25 TABLET, FILM COATED ORAL at 20:52

## 2021-02-28 RX ADMIN — ACETAMINOPHEN 1000 MG: 500 TABLET, COATED ORAL at 08:14

## 2021-02-28 RX ADMIN — ENOXAPARIN SODIUM 40 MG: 40 INJECTION SUBCUTANEOUS at 08:14

## 2021-02-28 RX ADMIN — ALBUTEROL SULFATE 2.5 MG: 2.5 SOLUTION RESPIRATORY (INHALATION) at 14:25

## 2021-02-28 RX ADMIN — Medication 10 ML: at 22:12

## 2021-02-28 RX ADMIN — Medication 3 MG: at 21:30

## 2021-02-28 RX ADMIN — METOCLOPRAMIDE HYDROCHLORIDE 5 MG: 5 INJECTION INTRAMUSCULAR; INTRAVENOUS at 12:09

## 2021-02-28 RX ADMIN — SODIUM PHOSPHATE, MONOBASIC, MONOHYDRATE AND SODIUM PHOSPHATE, DIBASIC, ANHYDROUS 15 MMOL: 276; 142 INJECTION, SOLUTION INTRAVENOUS at 10:40

## 2021-02-28 RX ADMIN — METOCLOPRAMIDE HYDROCHLORIDE 5 MG: 5 INJECTION INTRAMUSCULAR; INTRAVENOUS at 06:16

## 2021-02-28 RX ADMIN — OXYCODONE 5 MG: 5 TABLET ORAL at 13:20

## 2021-02-28 RX ADMIN — INSULIN LISPRO 3 UNITS: 100 INJECTION, SOLUTION INTRAVENOUS; SUBCUTANEOUS at 12:09

## 2021-02-28 RX ADMIN — INSULIN LISPRO 2 UNITS: 100 INJECTION, SOLUTION INTRAVENOUS; SUBCUTANEOUS at 21:31

## 2021-02-28 RX ADMIN — INSULIN LISPRO 9 UNITS: 100 INJECTION, SOLUTION INTRAVENOUS; SUBCUTANEOUS at 08:18

## 2021-02-28 RX ADMIN — METOCLOPRAMIDE HYDROCHLORIDE 5 MG: 5 INJECTION INTRAMUSCULAR; INTRAVENOUS at 02:44

## 2021-02-28 RX ADMIN — SODIUM CHLORIDE, SODIUM GLUCONATE, SODIUM ACETATE, POTASSIUM CHLORIDE AND MAGNESIUM CHLORIDE: 526; 502; 368; 37; 30 INJECTION, SOLUTION INTRAVENOUS at 00:21

## 2021-02-28 ASSESSMENT — PAIN DESCRIPTION - ONSET
ONSET: PROGRESSIVE
ONSET: ON-GOING

## 2021-02-28 ASSESSMENT — PAIN SCALES - GENERAL
PAINLEVEL_OUTOF10: 2
PAINLEVEL_OUTOF10: 8
PAINLEVEL_OUTOF10: 4
PAINLEVEL_OUTOF10: 0

## 2021-02-28 ASSESSMENT — PAIN DESCRIPTION - LOCATION: LOCATION: LEG

## 2021-02-28 ASSESSMENT — PAIN DESCRIPTION - ORIENTATION
ORIENTATION: RIGHT;LEFT
ORIENTATION: RIGHT;LEFT

## 2021-02-28 ASSESSMENT — PAIN - FUNCTIONAL ASSESSMENT: PAIN_FUNCTIONAL_ASSESSMENT: ACTIVITIES ARE NOT PREVENTED

## 2021-02-28 NOTE — PROGRESS NOTES
Pt alert and oriented x 4. Patient is resting in the chair, pt is uncomfortable in the bed. Pt has had some complaints of pain but has been managed with Tylenol. Pt is unsteady on her feet and uses a walker and gait belt. Patients incisions are clean, dry and intact. Bowel sounds very hypoactive, just barely tinkling. Patients urine output picking up with clear, yellow urine. Call light in reach. Will continue to monitor.  Electronically signed by Ashish Li RN on 2/28/2021 at 12:27 AM

## 2021-02-28 NOTE — PROGRESS NOTES
Patient is Aox4 and able to make needs known; compliant with all medications as ordered; VSS, except for need of supplemental O2 1.5L-2LNC 91%-94%; RRR: denied pain; bowel sounds hypoactive tinkling; wood removed, monitoring voiding pattern; ambulates with an unsteady gait, with front wheel walker; tolerating nutrition; no complaints of n/v/cp/sob; call light within reach; bed alarm engaged; increased frequency of rounds; will continue to monitor.      Electronically signed by Tee Seth RN on 2/28/2021 at 11:54 AM

## 2021-02-28 NOTE — PROGRESS NOTES
Vee removed at 26 without issue    Electronically signed by Diomedes Lazar RN on 2/28/2021 at 7:52 AM

## 2021-02-28 NOTE — PLAN OF CARE
Problem: Pain:  Goal: Pain level will decrease  Description: Pain level will decrease  2/28/2021 0451 by Molly Wiley RN  Note: Patients pain has been managed by Tylenol and being repositioned. Will continue to monitor. Problem: SKIN INTEGRITY  Goal: Skin integrity is maintained or improved  Note: Patient is not showing any compromised skin integrity. Will continue to monitor.  Electronically signed by Molly Wiley RN on 2/28/2021 at 4:53 AM

## 2021-02-28 NOTE — PROGRESS NOTES
Interval Progress     The patient was seen on PM interval rounds. She continues to report no new abdominal pain, though she does report feeling bloated. Her abdomen remains soft and appropriately tender. She does have some increased distention and is tympanic in the epigastrium which is consistent with her subjectively feeling bloated. There continues to be no ecchymosis or signs of hemorrhage. The patient is making more-than-adequate urine without the need for any boluses of IVF in addition to that which was administered earlier today. She is hemodynamically stable with normotension and mild tachycardia. The patient's lactate has normalized status post the aforementioned 1L plasmalyte bolus with subsequent transition to plasmalyte for maintenance fluid as well as 100mg of thiamine. Accordingly, serial lactate levels have been cancelled.      Maceo Koyanagi, MD, MPH  PGY-2 General Surgery  02/27/21  11:52 PM

## 2021-02-28 NOTE — PROGRESS NOTES
Pt refused to ambulate and go on a walk. She did agree to go in the morning. Will try again later.  Electronically signed by Marnie Lucero RN on 2/28/2021 at 12:23 AM

## 2021-02-28 NOTE — PROGRESS NOTES
General Surgery   Daily Progress Note  Patient: Paddy Alvarez      CC: Robotic, hand assisted laparoscopic partial colectomy. SUBJECTIVE:   Lactate cleared overnight with 1L bolus of plasmalyte  Pain controlled  Denies N/V  Sat in chair overnight      ROS:   A 14 point review of systems was conducted, significant findings as noted above. All other systems negative. OBJECTIVE:    PHYSICAL EXAM:    Vitals:    02/27/21 1529 02/27/21 2203 02/27/21 2343 02/28/21 0301   BP: 120/77 (!) 116/59 126/74 101/61   Pulse: 100 104 102 108   Resp: 18 18 18 20   Temp: 99.6 °F (37.6 °C) 97.9 °F (36.6 °C) 99.6 °F (37.6 °C) 98.1 °F (36.7 °C)   TempSrc: Oral Oral Oral Oral   SpO2: 92% 93% 93% 96%   Weight:       Height:           General appearance: alert, no acute distress, grooming appropriate  Eyes: No scleral icterus, EOM grossly intact  Neck: trachea midline, no JVD, no lymphadenopathy, neck supple  Chest/Lungs: CTAB, no crackles/rales, wheezes/rhonchi, normal effort with no accessory muscle use on 2L NC  Cardiovascular: RRR, no murmurs/gallops/rubs, S1 and S2 noted, well perfused  Abdomen: Soft, appropriately-tender, incisions c/d/i and well approximated with Dermabond, patient with one larger incision on the left lateral upper quadrant of the abdomen, also approximated and dressed with dermabond. Skin: warm and dry, no rashes  Extremities: no edema, no cyanosis  Genitourinary: Vee in place with clear yellow urine  Neuro: A&Ox3, no focal deficits, sensation intact    LABS:   Recent Labs     02/27/21  1501 02/28/21  0454   WBC 13.5* 13.8*   HGB 7.8* 7.4*   HCT 24.6* 23.4*   MCV 89.3 89.5    213        Recent Labs     02/27/21  0513 02/28/21  0454   * 134*   K 3.8 4.2    99   CO2 23 24   PHOS 3.7 2.3*   BUN 10 8   CREATININE 0.9 0.9      No results for input(s): AST, ALT, ALB, BILIDIR, BILITOT, ALKPHOS in the last 72 hours. No results for input(s): LIPASE, AMYLASE in the last 72 hours.      Recent Labs     02/26/21  2358   INR 1.09      No results for input(s): CKTOTAL, CKMB, CKMBINDEX, TROPONINI in the last 72 hours.       ASSESSMENT & PLAN:   This is a 65y.o. year old female status post Robotic, hand assisted laparoscopic partial colectomy secondary to cancer of the descending colon (2/26) POD2    - SLIV  - Remove wood  - On home Lopressor (25mg BID)  - General diet  - Lovenox started  - Encourage ambulation and OOB  - Will order PT/OT today  - Anticipate discharge tomorrow    Lukasz Capellan DO  PGY1, General Surgery  02/28/21  7:07 AM  [phone]

## 2021-03-01 ENCOUNTER — APPOINTMENT (OUTPATIENT)
Dept: GENERAL RADIOLOGY | Age: 71
DRG: 330 | End: 2021-03-01
Attending: SURGERY
Payer: MEDICARE

## 2021-03-01 LAB
ALBUMIN SERPL-MCNC: 2.9 G/DL (ref 3.4–5)
ANION GAP SERPL CALCULATED.3IONS-SCNC: 12 MMOL/L (ref 3–16)
BASOPHILS ABSOLUTE: 0.1 K/UL (ref 0–0.2)
BASOPHILS RELATIVE PERCENT: 0.4 %
BLOOD BANK DISPENSE STATUS: NORMAL
BLOOD BANK PRODUCT CODE: NORMAL
BPU ID: NORMAL
BUN BLDV-MCNC: 6 MG/DL (ref 7–20)
CALCIUM SERPL-MCNC: 8.4 MG/DL (ref 8.3–10.6)
CHLORIDE BLD-SCNC: 101 MMOL/L (ref 99–110)
CO2: 25 MMOL/L (ref 21–32)
CREAT SERPL-MCNC: 0.7 MG/DL (ref 0.6–1.2)
DESCRIPTION BLOOD BANK: NORMAL
EOSINOPHILS ABSOLUTE: 0.3 K/UL (ref 0–0.6)
EOSINOPHILS RELATIVE PERCENT: 1.7 %
GFR AFRICAN AMERICAN: >60
GFR NON-AFRICAN AMERICAN: >60
GLUCOSE BLD-MCNC: 172 MG/DL (ref 70–99)
GLUCOSE BLD-MCNC: 177 MG/DL (ref 70–99)
GLUCOSE BLD-MCNC: 194 MG/DL (ref 70–99)
GLUCOSE BLD-MCNC: 197 MG/DL (ref 70–99)
GLUCOSE BLD-MCNC: 213 MG/DL (ref 70–99)
HCT VFR BLD CALC: 21.5 % (ref 36–48)
HCT VFR BLD CALC: 27.4 % (ref 36–48)
HEMOGLOBIN: 7 G/DL (ref 12–16)
HEMOGLOBIN: 8.8 G/DL (ref 12–16)
LYMPHOCYTES ABSOLUTE: 1.8 K/UL (ref 1–5.1)
LYMPHOCYTES RELATIVE PERCENT: 11.5 %
MAGNESIUM: 2.1 MG/DL (ref 1.8–2.4)
MCH RBC QN AUTO: 28.5 PG (ref 26–34)
MCHC RBC AUTO-ENTMCNC: 32.4 G/DL (ref 31–36)
MCV RBC AUTO: 88.1 FL (ref 80–100)
MONOCYTES ABSOLUTE: 1.1 K/UL (ref 0–1.3)
MONOCYTES RELATIVE PERCENT: 7 %
NEUTROPHILS ABSOLUTE: 12.2 K/UL (ref 1.7–7.7)
NEUTROPHILS RELATIVE PERCENT: 79.4 %
PDW BLD-RTO: 15.1 % (ref 12.4–15.4)
PERFORMED ON: ABNORMAL
PHOSPHORUS: 2.6 MG/DL (ref 2.5–4.9)
PLATELET # BLD: 219 K/UL (ref 135–450)
PMV BLD AUTO: 7.9 FL (ref 5–10.5)
POTASSIUM SERPL-SCNC: 3.7 MMOL/L (ref 3.5–5.1)
PRO-BNP: 261 PG/ML (ref 0–124)
RBC # BLD: 2.44 M/UL (ref 4–5.2)
SODIUM BLD-SCNC: 138 MMOL/L (ref 136–145)
WBC # BLD: 15.4 K/UL (ref 4–11)

## 2021-03-01 PROCEDURE — 2580000003 HC RX 258: Performed by: STUDENT IN AN ORGANIZED HEALTH CARE EDUCATION/TRAINING PROGRAM

## 2021-03-01 PROCEDURE — P9016 RBC LEUKOCYTES REDUCED: HCPCS

## 2021-03-01 PROCEDURE — 85018 HEMOGLOBIN: CPT

## 2021-03-01 PROCEDURE — 6360000002 HC RX W HCPCS: Performed by: STUDENT IN AN ORGANIZED HEALTH CARE EDUCATION/TRAINING PROGRAM

## 2021-03-01 PROCEDURE — 71045 X-RAY EXAM CHEST 1 VIEW: CPT

## 2021-03-01 PROCEDURE — 94640 AIRWAY INHALATION TREATMENT: CPT

## 2021-03-01 PROCEDURE — 6370000000 HC RX 637 (ALT 250 FOR IP): Performed by: NURSE PRACTITIONER

## 2021-03-01 PROCEDURE — 36415 COLL VENOUS BLD VENIPUNCTURE: CPT

## 2021-03-01 PROCEDURE — 85025 COMPLETE CBC W/AUTO DIFF WBC: CPT

## 2021-03-01 PROCEDURE — 1200000000 HC SEMI PRIVATE

## 2021-03-01 PROCEDURE — 83735 ASSAY OF MAGNESIUM: CPT

## 2021-03-01 PROCEDURE — 85014 HEMATOCRIT: CPT

## 2021-03-01 PROCEDURE — 6370000000 HC RX 637 (ALT 250 FOR IP): Performed by: STUDENT IN AN ORGANIZED HEALTH CARE EDUCATION/TRAINING PROGRAM

## 2021-03-01 PROCEDURE — 97116 GAIT TRAINING THERAPY: CPT

## 2021-03-01 PROCEDURE — 94669 MECHANICAL CHEST WALL OSCILL: CPT

## 2021-03-01 PROCEDURE — 2500000003 HC RX 250 WO HCPCS: Performed by: STUDENT IN AN ORGANIZED HEALTH CARE EDUCATION/TRAINING PROGRAM

## 2021-03-01 PROCEDURE — 94618 PULMONARY STRESS TESTING: CPT

## 2021-03-01 PROCEDURE — 80069 RENAL FUNCTION PANEL: CPT

## 2021-03-01 PROCEDURE — 6370000000 HC RX 637 (ALT 250 FOR IP): Performed by: SURGERY

## 2021-03-01 PROCEDURE — 6360000002 HC RX W HCPCS: Performed by: SURGERY

## 2021-03-01 PROCEDURE — 97535 SELF CARE MNGMENT TRAINING: CPT

## 2021-03-01 PROCEDURE — 36430 TRANSFUSION BLD/BLD COMPNT: CPT

## 2021-03-01 PROCEDURE — 94760 N-INVAS EAR/PLS OXIMETRY 1: CPT

## 2021-03-01 PROCEDURE — 94761 N-INVAS EAR/PLS OXIMETRY MLT: CPT

## 2021-03-01 PROCEDURE — 99024 POSTOP FOLLOW-UP VISIT: CPT | Performed by: SURGERY

## 2021-03-01 PROCEDURE — 83880 ASSAY OF NATRIURETIC PEPTIDE: CPT

## 2021-03-01 PROCEDURE — 2700000000 HC OXYGEN THERAPY PER DAY

## 2021-03-01 PROCEDURE — 97166 OT EVAL MOD COMPLEX 45 MIN: CPT

## 2021-03-01 PROCEDURE — 97162 PT EVAL MOD COMPLEX 30 MIN: CPT

## 2021-03-01 PROCEDURE — 97530 THERAPEUTIC ACTIVITIES: CPT

## 2021-03-01 RX ORDER — MONTELUKAST SODIUM 10 MG/1
10 TABLET ORAL DAILY
Status: DISCONTINUED | OUTPATIENT
Start: 2021-03-01 | End: 2021-03-05 | Stop reason: HOSPADM

## 2021-03-01 RX ORDER — SODIUM CHLORIDE 9 MG/ML
INJECTION, SOLUTION INTRAVENOUS PRN
Status: DISCONTINUED | OUTPATIENT
Start: 2021-03-01 | End: 2021-03-05 | Stop reason: HOSPADM

## 2021-03-01 RX ORDER — PRAVASTATIN SODIUM 20 MG
20 TABLET ORAL DAILY
Status: DISCONTINUED | OUTPATIENT
Start: 2021-03-01 | End: 2021-03-05 | Stop reason: HOSPADM

## 2021-03-01 RX ORDER — OXYCODONE HYDROCHLORIDE 5 MG/1
5 TABLET ORAL EVERY 6 HOURS PRN
Qty: 12 TABLET | Refills: 0 | Status: SHIPPED | OUTPATIENT
Start: 2021-03-01 | End: 2021-03-04

## 2021-03-01 RX ORDER — ONDANSETRON 4 MG/1
4 TABLET, ORALLY DISINTEGRATING ORAL EVERY 8 HOURS PRN
Qty: 15 TABLET | Refills: 0 | Status: SHIPPED | OUTPATIENT
Start: 2021-03-01

## 2021-03-01 RX ORDER — HYDRALAZINE HYDROCHLORIDE 20 MG/ML
5 INJECTION INTRAMUSCULAR; INTRAVENOUS EVERY 6 HOURS PRN
Status: DISCONTINUED | OUTPATIENT
Start: 2021-03-01 | End: 2021-03-03

## 2021-03-01 RX ORDER — AMLODIPINE BESYLATE 5 MG/1
5 TABLET ORAL ONCE
Status: COMPLETED | OUTPATIENT
Start: 2021-03-01 | End: 2021-03-01

## 2021-03-01 RX ORDER — MECLIZINE HYDROCHLORIDE 25 MG/1
25 TABLET ORAL 3 TIMES DAILY PRN
Status: DISCONTINUED | OUTPATIENT
Start: 2021-03-01 | End: 2021-03-05 | Stop reason: HOSPADM

## 2021-03-01 RX ADMIN — INSULIN LISPRO 3 UNITS: 100 INJECTION, SOLUTION INTRAVENOUS; SUBCUTANEOUS at 13:44

## 2021-03-01 RX ADMIN — AMLODIPINE BESYLATE 5 MG: 5 TABLET ORAL at 23:24

## 2021-03-01 RX ADMIN — ALBUTEROL SULFATE 2.5 MG: 2.5 SOLUTION RESPIRATORY (INHALATION) at 15:38

## 2021-03-01 RX ADMIN — INSULIN LISPRO 6 UNITS: 100 INJECTION, SOLUTION INTRAVENOUS; SUBCUTANEOUS at 19:35

## 2021-03-01 RX ADMIN — PRAVASTATIN SODIUM 20 MG: 20 TABLET ORAL at 08:42

## 2021-03-01 RX ADMIN — SERTRALINE HYDROCHLORIDE 50 MG: 50 TABLET ORAL at 08:58

## 2021-03-01 RX ADMIN — MECLIZINE HYDROCHLORIDE 25 MG: 25 TABLET ORAL at 09:40

## 2021-03-01 RX ADMIN — METOPROLOL TARTRATE 25 MG: 25 TABLET, FILM COATED ORAL at 08:42

## 2021-03-01 RX ADMIN — ACETAMINOPHEN 1000 MG: 500 TABLET, COATED ORAL at 05:26

## 2021-03-01 RX ADMIN — ALBUTEROL SULFATE 2.5 MG: 2.5 SOLUTION RESPIRATORY (INHALATION) at 08:20

## 2021-03-01 RX ADMIN — ACETAMINOPHEN 1000 MG: 500 TABLET, COATED ORAL at 13:39

## 2021-03-01 RX ADMIN — ALBUTEROL SULFATE 2.5 MG: 2.5 SOLUTION RESPIRATORY (INHALATION) at 21:52

## 2021-03-01 RX ADMIN — POTASSIUM PHOSPHATE, MONOBASIC AND POTASSIUM PHOSPHATE, DIBASIC 15 MMOL: 224; 236 INJECTION, SOLUTION, CONCENTRATE INTRAVENOUS at 08:58

## 2021-03-01 RX ADMIN — MECLIZINE HYDROCHLORIDE 25 MG: 25 TABLET ORAL at 23:25

## 2021-03-01 RX ADMIN — Medication 10 ML: at 08:43

## 2021-03-01 RX ADMIN — ACETAMINOPHEN 1000 MG: 500 TABLET, COATED ORAL at 23:24

## 2021-03-01 RX ADMIN — INSULIN LISPRO 3 UNITS: 100 INJECTION, SOLUTION INTRAVENOUS; SUBCUTANEOUS at 08:32

## 2021-03-01 RX ADMIN — ACETAMINOPHEN 1000 MG: 500 TABLET, COATED ORAL at 19:29

## 2021-03-01 RX ADMIN — HYDRALAZINE HYDROCHLORIDE 5 MG: 20 INJECTION INTRAMUSCULAR; INTRAVENOUS at 19:29

## 2021-03-01 RX ADMIN — ENOXAPARIN SODIUM 40 MG: 40 INJECTION SUBCUTANEOUS at 08:43

## 2021-03-01 RX ADMIN — METOPROLOL TARTRATE 25 MG: 25 TABLET, FILM COATED ORAL at 20:28

## 2021-03-01 RX ADMIN — INSULIN LISPRO 2 UNITS: 100 INJECTION, SOLUTION INTRAVENOUS; SUBCUTANEOUS at 22:30

## 2021-03-01 RX ADMIN — MONTELUKAST 10 MG: 10 TABLET, FILM COATED ORAL at 08:42

## 2021-03-01 RX ADMIN — Medication 10 ML: at 20:28

## 2021-03-01 ASSESSMENT — PAIN SCALES - GENERAL
PAINLEVEL_OUTOF10: 0

## 2021-03-01 NOTE — PROGRESS NOTES
medically stable. Cont. per POC. Treatment Diagnosis: Impaired ADLs & transfers  Prognosis: Good  Decision Making: Medium Complexity  OT Education: OT Role;Plan of Care;Equipment;ADL Adaptive Strategies;Transfer Training;Energy Conservation(& reinforcing daughter providing initial 24 hr hands-on assist)  Patient Education: pt verbalized understanding of all; continue to reinforce  REQUIRES OT FOLLOW UP: Yes  Activity Tolerance  Activity Tolerance: Patient limited by fatigue;Treatment limited secondary to medical complications (free text)  Activity Tolerance: No sign of orthostatic BP in all positions: 151/90 at rest, and systolic BP wavered up w/ EOB and down w/ standing. SpO2 remained above 95 throughout session, despite demonstrated SOB (as low as 96 and recovered w/ PLB and rest), on 2L. Safety Devices  Safety Devices in place: Yes  Type of devices: Bed alarm in place;Call light within reach; Left in bed;Nurse notified           Patient Diagnosis(es): The primary encounter diagnosis was Post-op pain. A diagnosis of Cancer of descending colon [facility]) was also pertinent to this visit. has a past medical history of Allergic rhinitis, Asthma, Back pain, GI bleeding, Glaucoma, Hypertension, Knee pain, Morbid obesity due to excess calories ([address] Osteoporosis, and Type II or unspecified type diabetes mellitus without mention of complication, not stated as uncontrolled. has a past surgical history that includes Colonoscopy (9/27/2014); knee surgery (Left); Hysterectomy; Colonoscopy (1/30/2020); Colonoscopy (1/30/2020); and Small intestine surgery (Left, 2/26/2021).     Treatment Diagnosis: Impaired ADLs & transfers      Restrictions  Position Activity Restriction  Other position/activity restrictions: up w/ A, ambulate pt; abdominal binder    Subjective   General  Chart Reviewed: Yes  Patient assessed for rehabilitation services?: Yes  Additional Pertinent Hx: Hx = asthma, HTN, DM  Diagnosis: 2/26 scheduled OR Robotic-assisted laparoscopic left colectomy with colocolonic anastomosis. Complications: Intraabdominal bleeding requiring one unit of packed redblood cells. Subjective  Subjective: Pt in bed on arrival, c/o dizziness and demonstrating SOB at rest however pleasant and agreeable to therapy.   Patient Currently in Pain: (did not report pain before or during physical activity)     Social/Functional History  Social/Functional History  Lives With: (daughter is home in-and-out (not 24 hr but does not work), and is able to physically assist)  Type of Home: House  Home Layout: Two level, Performs ADL's on one level, Laundry in basement  Home Access: Stairs to enter with rails  Entrance Stairs - Number of Steps: 5  Bathroom Shower/Tub: Walk-in shower(provided by [facility])  Bathroom Toilet: Standard  Bathroom Equipment: Shower chair  Home Equipment: 4 wheeled walker, Standard walker, Cane(adjustable bed; recliner)  ADL Assistance: Independent  Homemaking Assistance: (daughter assists with groceries)  Ambulation Assistance: Independent(uses walker in bathroom)  Transfer Assistance: Independent  Active : No  Occupation: Retired  Type of occupation: [employer]; part time job at [employer]  Additional Comments: has medical transport services through [employer] (vs. Medicare); took a fall in May 2020 and broke 4 ribs       Objective   Vision: Impaired  Vision Exceptions: Wears glasses for reading  Hearing: Within functional limits    Orientation  Overall Orientation Status: Within Functional Limits     Balance  Sitting Balance: Contact guard assistance(sitting EOB c/o dizziness)  Standing Balance: Contact guard assistance  Standing Balance  Time: up to 2 min  Activity: sinkside ADL & bathroom mobility  Comment: increased assistance provided 2/2 concerns for dizziness  Functional Mobility  Functional - Mobility Device: Rolling Walker  Activity: To/from bathroom  Assist Level: Contact guard assistance  Toilet Transfers  Toilet - Technique: Ambulating  Equipment Used: Standard toilet(heavy use of grab bar R side)  Toilet Transfer: Contact guard assistance  ADL  Feeding: Setup  Grooming: Contact guard assistance(washing hands sinkside in stance)  LE Dressing: (unable to assess this session 2/2 dizziness)  Toileting: Minimal assistance(Pt req'd assistance w/ micheal care 2/2 body habitus. RN reporting pt able to complete bowel care w/out assistance)        Bed mobility  Supine to Sit: Minimal assistance(assist w/ trunk)  Sit to Supine: Minimal assistance(assist w/ one leg)  Scooting: Contact guard assistance  Transfers  Sit to stand: Contact guard assistance  Stand to sit: Contact guard assistance  Vision - Basic Assessment  Prior Vision: No visual deficits  Patient Visual Report: No visual complaint reported. Cognition  Overall Cognitive Status: [facility]  Cognition Comment: pt initiates safety, is witty, and demonstrates appropriate sequencing. She requires some assistance to problem-solve, redirect back to task, and reinforce safe habits at home.   Perception  Overall Perceptual Status: [facility]              LUE AROM (degrees)  LUE AROM : WFL(cont. to assess)  RUE AROM (degrees)  RUE AROM : WFL(cont. to assess)                      Plan   Plan  Times per week: 2-5x  Times per day: Daily  Current Treatment Recommendations: Strengthening, Functional Mobility Training, Endurance Training, Safety Education & Training, Self-Care / ADL    G-Code     OutComes Score                                                  AM-PAC Score        AM-[facility] Inpatient Daily Activity Raw Score: 17 (03/01/21 1239)  AM-PAC Inpatient ADL T-Scale Score : 37.26 (03/01/21 1239)  ADL Inpatient CMS 0-100% Score: 50.11 (03/01/21 1239)  ADL Inpatient CMS G-Code Modifier : CK (03/01/21 [facility])    Goals  Short term goals  Time Frame for Short term goals: discharge  Short term goal 1: Pt will complete toilet hygiene at SBA level  Short term goal 2: Pt will complete toilet transfer at SBA level  Short term goal 3: Pt will participate in functional standing task, >2 min at SUP level  Short term goal 4: Pt will participate in LE dressing assessment  Patient Goals   Patient goals : none stated       Therapy Time   Individual Concurrent Group Co-treatment   Time In 1132         Time Out 1227         Minutes 55              Timed Code Treatment Minutes:  40     Total Treatment Minutes:  1200 [facility], s/OT  Michelle Chávez OTR/L #[id number]  Therapist was present, directed the patient's care, made skilled judgement, and was responsible for assessment and treatment of the patient.

## 2021-03-01 NOTE — PROGRESS NOTES
Patient alert and oriented x4. Pt unsteady on her feet. VSS. Patients incisions are clean, dry and intact. Bowel sounds are hypoactive and are tinkling. Patient has Pure wick in place and draining an adequate amount of urine. Patient has some complaints of dizziness. Call light in reach. Will continue to monitor.  Electronically signed by Penelope Corona RN on 3/1/2021 at 1:26 AM

## 2021-03-01 NOTE — PROGRESS NOTES
POC:    Patient continues to complain of increased dizziness. No response from [facility]. Marquisejuliette held. Tried to work with PT/OT but struggles with her complaint  Discussed with staff. Will transfuse 1 unit of PRBC. Will monitor response    YAZ Villalpando NP-C  [phone]  Resident Support Staff

## 2021-03-01 NOTE — PROGRESS NOTES
Patient alert and oriented. Vitals stable. Denies pain. Patient on O2 at 1 liter per nasal canula. RT evaluated for Home O2, will need. Respirations labored with exertion. Lap sites abdomen surgically glued, cdi. Bowel sounds present. Patient continues to complain of dizziness, requested meclazine order, received and given to patient, no benefit. IV right and left arms, currently infusing K phos supplement and present. Patient denies nausea, tolerated peanut butter crackers this am, did not like breakfast from dietary. Patient up with hands on assist.  Encouraged IS. Call light in reach, will monitor.

## 2021-03-01 NOTE — ANESTHESIA POSTPROCEDURE EVALUATION
Department of Anesthesiology  Postprocedure Note    Patient: Jacque Jimenez  MRN: [id number]  YOB: 1950  Date of evaluation: 3/1/2021  Time:  7:25 AM     Procedure Summary     Date: 02/26/21 Room / Location: [address] [place] [address]    Anesthesia Start: 1059 Anesthesia Stop: 1728    Procedure: ROBOTIC VERSUS LAPAROSCOPIC LEFT COLECTOMY (Left Abdomen) Diagnosis:       Cancer of descending colon ([address]      (Cancer of descending colon ([address] [C18.6])    Surgeons: Ling Leyva MD Responsible Provider: So Casas MD    Anesthesia Type: general ASA Status: 3          Anesthesia Type: general    Allegra Phase I: Allegra Score: 4    Allegra Phase II:      Last vitals: Reviewed and per EMR flowsheets.        Anesthesia Post Evaluation    Patient location during evaluation: PACU  Patient participation: complete - patient participated  Level of consciousness: awake  Pain score: 2  Airway patency: patent  Nausea & Vomiting: no nausea and no vomiting  Complications: no  Cardiovascular status: hemodynamically stable  Respiratory status: acceptable  Hydration status: euvolemic

## 2021-03-01 NOTE — CARE COORDINATION
Cm following, pt POD#3 colectomy, pt from home with family support, plans to return home no needs anticipated. Pt tolerating gen diet, voiding after FC removed, cont to wean O2, (no qualifying diagnosis for home O2 will have to private pay if not able to wean), pending PT OT evals for DC recs.   Electronically signed by Waleska Fleming RN on 3/1/2021 at 10:34 AM  [phone]

## 2021-03-01 NOTE — PROGRESS NOTES
Demonstrate understanding       [] Teach back        [] Needs reinforcement        []  No available caregiver               []  Other:     Response to education:  Good     Time Spent with Home O2 Set Up:  10  minutes     Dominik Barnes RCP on 3/1/2021 at 8:35 AM                 .

## 2021-03-01 NOTE — PROGRESS NOTES
Surgery Daily Progress Note      CC: Colon adenocarcinoma    SUBJECTIVE:  No acute events overnight. Remains afebrile; unable to wean off 2L NC supplemental O2. Noted occasional dizziness yesterday. Abdominal pain present, controlled and improving at this time. Urinating without issue following Vee removal.    ROS:   A 14 point review of systems was conducted, significant findings as noted above. All other systems negative. OBJECTIVE:    PHYSICAL EXAM:  Vitals:    02/28/21 2133 02/28/21 2353 03/01/21 0259 03/01/21 0302   BP:  120/72 136/80    Pulse:  84 96    Resp: 20 18 20    Temp:  98 °F (36.7 °C) 97.8 °F (36.6 °C)    TempSrc:  Oral Oral    SpO2: 95% 94% (!) 87% 92%   Weight:       Height:           General appearance: Alert, no acute distress, well-developed, well-nourished  HEENT: Normocephalic, extraocular movements grossly intact, moist mucous membranes  Chest/Lungs: normal inspiratory effort, symmetric chest rise, no accessory muscle use  Cardiovascular: Regular rate and rhythm, no murmurs  Abdomen: Soft, obese, appropriately tender to palpation throughout, incisions clean/dry/intact  Neuro: A&Ox3, no gross motor or sensory neuro deficits  Extremities: no edema, no cyanosis      ASSESSMENT & PLAN:   Cierra Gaytan is a 65 y.o. female with history of DM who presented to [facility] on 2/26 for elective surgery for her descending colon cancer, undergoing hand/robotic-assisted laparoscopic left colon resection 2/26, POD #3.    - Continue regular diet  - necessary home meds restarted  - Follow up PT/OT evaluation  - Encouraged OOB/ambulation  - Continue PO pain medication  - if unable to wean 02, may need home 02 evaluation  - Anticipate discharge today vs tomorrow    YAZ Morejon NPRaminC  [phone]  Resident Support Staff    Attending Supervising Physician's [address] Statement  I performed a history and physical examination on the patient and discussed the management with the nurse practitioner.  I reviewed and agree with the findings and plan as documented in her note . Doing well. Discussed intra-op blood loss. Needs to work with PT/OT. Potential D/C today vs tomorrow.     Electronically signed by Rene Carty MD on 3/1/21 at 7:25 AM EST Epidermal Autograft Text: The defect edges were debeveled with a #15 scalpel blade.  Given the location of the defect, shape of the defect and the proximity to free margins an epidermal autograft was deemed most appropriate.  Using a sterile surgical marker, the primary defect shape was transferred to the donor site. The epidermal graft was then harvested.  The skin graft was then placed in the primary defect and oriented appropriately.

## 2021-03-01 NOTE — PROGRESS NOTES
Physical Therapy    Facility/Department: [address] [place] [address] N [address] [facility]  Initial Assessment    NAME: Thelma Godoy  : 1950  MRN: [id number]    Date of Service: 3/1/2021    Discharge Recommendations:Kianna Soliman scored a 17/24 on the AM-PAC short mobility form. Current research shows that an AM-PAC score of 17 or less is typically not associated with a discharge to the patient's home setting. Based on the patient's AM-PAC score and their current functional mobility deficits, it is recommended that the patient have 3-5 sessions per week of Physical Therapy at d/c to increase the patient's independence. Please see assessment section for further patient specific details. If patient discharges prior to next session this note will serve as a discharge summary. Please see below for the latest assessment towards goals. PT Equipment Recommendations  Equipment Needed: No    Assessment   Assessment: 66 yo admitted 19 for colon CA/lap colon resection. Pt demo mobility well below her reported baseline of independent at home without DME. Pt now requiring rolling walker, assist and supplemental 02 due to her being SOB (pending 1U PRBC due to Hgb 7). Pt hopeful to return home but unsure if family can provide assist. Pt open to SNF at discharge. Pt would benefit from continued skilled IP PT at discharge. If home, recommend 24 hour assist, home PT, use of rolling walker (has). Treatment Diagnosis: mobility impairment due to colon CA  Decision Making: Medium Complexity  Patient Education: Pt educated on PT role, importance of OOB mobility, need to call for assist to get up and she verbalized understanding. REQUIRES PT FOLLOW UP: Yes  Activity Tolerance  Activity Tolerance: Patient limited by endurance; Patient limited by fatigue       Patient Diagnosis(es): The primary encounter diagnosis was Post-op pain. A diagnosis of Cancer of descending colon [facility]) was also pertinent to this visit.      has a past medical history of Allergic rhinitis, Asthma, Back pain, GI bleeding, Glaucoma, Hypertension, Knee pain, Morbid obesity due to excess calories ([address] Osteoporosis, and Type II or unspecified type diabetes mellitus without mention of complication, not stated as uncontrolled. has a past surgical history that includes Colonoscopy (9/27/2014); knee surgery (Left); Hysterectomy; Colonoscopy (1/30/2020); Colonoscopy (1/30/2020); and Small intestine surgery (Left, 2/26/2021). Restrictions  Position Activity Restriction  Other position/activity restrictions: up w/ A, ambulate pt; abdominal binder     Vision/Hearing  Vision: Impaired  Vision Exceptions: Wears glasses for reading  Hearing: Within functional limits       Subjective  General  Chart Reviewed: Yes  Patient assessed for rehabilitation services?: Yes  Additional Pertinent Hx: 65 y.o. female with history of DM who presented to [facility] on 2/26 for elective surgery for her descending colon cancer, undergoing hand/robotic-assisted laparoscopic left colon resection 2/26. Family / Caregiver Present: No  Diagnosis: colon CA  Follows Commands: Within Functional Limits  Subjective  Subjective: Pt found supine in bed and agreeable to PT. \" I just can't catch my breath. \"  Pain Screening  Patient Currently in Pain: Yes(rates abdominal pain 5/10, RN aware)         Orientation  Orientation  Overall Orientation Status: Within Functional Limits     Social/Functional History  Social/Functional History  Lives With: (daughter is home in-and-out (not 24 hr but does not work), and is able to physically assist)  Type of Home: House  Home Layout: Two level, Performs ADL's on one level, Laundry in basement  Home Access: Stairs to enter with rails  Entrance Stairs - Number of Steps: 5  Bathroom Shower/Tub: Walk-in shower(provided by [employer])  Bathroom Toilet: Standard  Bathroom Equipment: Shower chair  Home Equipment: 4 wheeled walker, Standard walker, Cane(adjustable bed; recliner)  ADL Assistance: Independent  Homemaking Assistance: (daughter assists with groceries)  Ambulation Assistance: Independent(uses walker in bathroom)  Transfer Assistance: Independent  Active : No  Occupation: Retired  Type of occupation: [employer]; part time job at [address] [place] [address] [place]: has medical transport services through [employer] (vs. Medicare); took a fall in May 2020 and broke 4 ribs       Objective          AROM RLE (degrees)  RLE AROM: WFL  AROM LLE (degrees)  LLE AROM : WFL     Strength RLE  Strength RLE: WFL  Strength LLE  Strength LLE: WFL           Transfers  Sit to Stand: Contact guard assistance(x2 trials with occ vc for hand placement)  Stand to sit: Contact guard assistance(x2 trials with occ vc for hand placement)     Ambulation  Device: Rolling Walker  Other Apparatus: O2(2L)  Assistance: Contact guard assistance  Quality of Gait: slow jay with decreased step length/height; DIXON on 2L 02 (sp02 93% or better, B/P 160s/70s throughout session)  Distance: 10 ft x2  Comments: Deferred further ambulation due to pt SOB at rest (Hgb noted to be 7 and per CNP pt will be getting 1U PRBC)              Plan   Plan  Times per week: 2-5  Current Treatment Recommendations: Functional Mobility Training, Transfer Training, Endurance Training, Gait Training, Balance Training  Safety Devices  Type of devices: Nurse notified, Bed alarm in place, Call light within reach, Left in bed            AM-PAC Score  AM-PAC Inpatient Mobility Raw Score : 17 (03/01/21 1318)  AM-PAC Inpatient T-Scale Score : 42.13 (03/01/21 1318)  Mobility Inpatient CMS 0-100% Score: 50.57 (03/01/21 1318)  Mobility Inpatient CMS G-Code Modifier : CK (03/01/21 1318)          Goals  Short term goals  Time Frame for Short term goals: discharge  Short term goal 1: sit to/from supine supervision  Short term goal 2: sit to/from stand supervision  Short term goal 3: ambulate 50 ft with rolling walker supervision  Patient Goals Patient goals : return home       Therapy Time   Individual Concurrent Group Co-treatment   Time In 1125         Time Out 1223         Minutes 58           Timed Code Treatment Minutes:48       Total Treatment Minutes:  1900 Carlos Duran, PT

## 2021-03-01 NOTE — DISCHARGE INSTR - COC
Continuity of Care Form    Patient Name: Frankie Mcnair   :  1950  MRN:  [id number]    Admit date:  2021  Discharge date:  ***    Code Status Order: Full Code   Advance Directives:   Advance Care Flowsheet Documentation       Date/Time Healthcare Directive Type of Healthcare Directive Copy in [address] Agent's Name Healthcare Agent's Phone Number    21 1008  No, patient does not have an advance directive for healthcare treatment -- -- -- -- --            Admitting Physician:  Ellis Wei MD  PCP: Dre Ahuja MD    Discharging Nurse: [facility] Unit/Room#: 1456/5303-29  Discharging Unit Phone Number: ***    Emergency Contact:   Extended Emergency Contact Information  Primary Emergency Contact: Lisa De Dios  Home Phone: [phone]  Mobile Phone: [phone]  Relation: Child    Past Surgical History:  Past Surgical History:   Procedure Laterality Date    COLONOSCOPY  2014    polyp removed    COLONOSCOPY  2020    COLONOSCOPY POLYPECTOMY SNARE/COLD BIOPSY performed by Jorge Champagne MD at [facility]  2020    COLONOSCOPY SUBMUCOSAL/BOTOX INJECTION performed by Jorge Champagne MD at [facility] Left     knee scoped and \"cleaned out\"    SMALL INTESTINE SURGERY Left 2021    ROBOTIC VERSUS LAPAROSCOPIC LEFT COLECTOMY performed by Ellis Wei MD at [address]       Immunization History:   Immunization History   Administered Date(s) Administered    Influenza Vaccine, unspecified formulation 2013, 10/15/2014, 2015, 2016    Influenza Virus Vaccine 2007    Influenza, High Dose (Fluzone 65 yrs and older) 2013, 10/15/2014, 2015, 2016, 09/15/2017, 2018    Influenza, Intradermal, Preservative free 2012    Influenza, Triv, inactivated, subunit, adjuvanted, IM (Fluad 65 yrs and older) 2019    Pneumococcal Conjugate 13-valent ([id number]) 04/22/2016    Pneumococcal Polysaccharide ([id number]) 08/12/2013    Tdap (Boostrix, Adacel) 09/15/2017    Tetanus 03/06/2013    Tetanus Toxoid, absorbed 03/06/2013       Active Problems:  Patient Active Problem List   Diagnosis Code    DM type 2 (diabetes mellitus, type 2) E11.9    Sinus congestion R09.81    Hypertension I10    Tear of lateral cartilage or meniscus of knee, current S83.289A    Rectal bleeding K62.5    Patellofemoral arthritis M17.10    Hyperlipidemia E78.5    CKD (chronic kidney disease) stage 3, GFR 30-59 ml/min ([facility]) N18.30    Moderate persistent asthma without complication S59.28    Morbid obesity due to excess calories ([facility]) E66.01    Asthma J45.909    Essential hypertension I10    Lateral meniscus tear S83.289A    Cancer of descending colon ([facility]) C18.6    Cancer of left colon ([facility]) C18.6       Isolation/Infection:   Isolation            No Isolation          Patient Infection Status       None to display            Nurse Assessment:  Last Vital Signs: /80   Pulse 96   Temp 97.8 °F (36.6 °C) (Oral)   Resp 20   Ht 5' (1.524 m)   Wt 222 lb (100.7 kg)   SpO2 92%   BMI 43.36 kg/m²     Last documented pain score (0-10 scale): Pain Level: 6  Last Weight:   Wt Readings from Last 1 Encounters:   02/26/21 222 lb (100.7 kg)     Mental Status:  oriented and alert    IV Access:  - None    Nursing Mobility/ADLs:  Walking   Independent  Transfer  Assisted  Bathing  Independent  Dressing  Assisted  Toileting  Assisted  Feeding  Independent  Med Admin  Independent  Med Delivery   whole    Wound Care Documentation and Therapy:        Elimination:  Continence:   · Bowel:  Yes  · Bladder: Yes  Urinary Catheter: None   Colostomy/Ileostomy/Ileal Conduit: No       Date of Last BM: ***    Intake/Output Summary (Last 24 hours) at 3/1/2021 0757  Last data filed at 2/28/2021 2357  Gross per 24 hour   Intake 480 ml   Output 2850 ml   Net -2370 ml     I/O last 3 completed shifts: In: 480 [P.O.:480]  Out: 3650 [Urine:3650]    Safety Concerns: At Risk for Falls    Impairments/Disabilities:      None    Nutrition Therapy:  Current Nutrition Therapy:   - Oral Diet:  General    Routes of Feeding: Oral  Liquids: Thin Liquids  Daily Fluid Restriction: no  Last Modified Barium Swallow with Video (Video Swallowing Test): not done    Treatments at the Time of Hospital Discharge:   Respiratory Treatments: ***  Oxygen Therapy:  is not on home oxygen therapy. Ventilator:    - No ventilator support    Rehab Therapies: Physical Therapy and Occupational Therapy  Weight Bearing Status/Restrictions: No weight bearing restirctions  Other Medical Equipment (for information only, NOT a DME order):  cane  Other Treatments: ***    Patient's personal belongings (please select all that are sent with patient):  Jewdavid, shashi, clothing    RN SIGNATURE:  Electronically signed by Ashlie Eubanks RN on 3/5/21 at 12:02 PM EST    CASE MANAGEMENT/SOCIAL WORK SECTION    Inpatient Status Date: ***    Readmission Risk Assessment Score:  Readmission Risk              Risk of Unplanned Readmission:        15           Discharging to Facility/ Agency   · Name:   · Address:  · Phone:  · Fax:    Dialysis Facility (if applicable)   · Name:  · Address:  · Dialysis Schedule:  · Phone:  · Fax:    / signature: {Esignature:[id number]:::0}    PHYSICIAN SECTION    Prognosis: Good    Condition at Discharge: Stable    Rehab Potential (if transferring to Rehab): Good    Recommended Labs or Other Treatments After Discharge: ***    Physician Certification: I certify the above information and transfer of Jeane Ríos  is necessary for the continuing treatment of the diagnosis listed and that she requires Home Care for greater 30 days.      Update Admission H&P: No change in H&P    PHYSICIAN SIGNATURE:  Electronically signed by DAVID Colmenares CNP on 3/1/21 at 7:58 AM EST      - No lifting >8lbs or a gallon of milk for 2 weeks. - No driving while on narcotics  Otherwise, you can drive when you can sit comfortably behind the steering wheel and can slam on the brake without it hurting or turn the wheel sharply without it hurting. Practice this when sitting the car with it parked in your driveway before trying to drive. - May shower, let water run over incision sites. No soaking in tub, hot tub, or pool. Do not submerge incision site in water.   - Notify MD immediately if experierencing fevers/chills, nausea/vomiting, pus-like discharge from incision sites, redness swelling, or warmth to incisions.     -Diet:   Diabetic diet    - Follow up with Dr. Jorgito Lopez in 2 weeks - call [phone] for appointment.

## 2021-03-02 LAB
ALBUMIN SERPL-MCNC: 3.2 G/DL (ref 3.4–5)
ANION GAP SERPL CALCULATED.3IONS-SCNC: 11 MMOL/L (ref 3–16)
BASOPHILS ABSOLUTE: 0 K/UL (ref 0–0.2)
BASOPHILS RELATIVE PERCENT: 0.2 %
BUN BLDV-MCNC: 5 MG/DL (ref 7–20)
CALCIUM SERPL-MCNC: 8.3 MG/DL (ref 8.3–10.6)
CHLORIDE BLD-SCNC: 101 MMOL/L (ref 99–110)
CO2: 27 MMOL/L (ref 21–32)
CREAT SERPL-MCNC: 0.6 MG/DL (ref 0.6–1.2)
EOSINOPHILS ABSOLUTE: 0.2 K/UL (ref 0–0.6)
EOSINOPHILS RELATIVE PERCENT: 1.5 %
GFR AFRICAN AMERICAN: >60
GFR NON-AFRICAN AMERICAN: >60
GLUCOSE BLD-MCNC: 144 MG/DL (ref 70–99)
GLUCOSE BLD-MCNC: 144 MG/DL (ref 70–99)
GLUCOSE BLD-MCNC: 175 MG/DL (ref 70–99)
GLUCOSE BLD-MCNC: 176 MG/DL (ref 70–99)
GLUCOSE BLD-MCNC: 176 MG/DL (ref 70–99)
HCT VFR BLD CALC: 25.2 % (ref 36–48)
HEMOGLOBIN: 8.4 G/DL (ref 12–16)
LYMPHOCYTES ABSOLUTE: 1.9 K/UL (ref 1–5.1)
LYMPHOCYTES RELATIVE PERCENT: 13.6 %
MAGNESIUM: 1.8 MG/DL (ref 1.8–2.4)
MCH RBC QN AUTO: 29.1 PG (ref 26–34)
MCHC RBC AUTO-ENTMCNC: 33.2 G/DL (ref 31–36)
MCV RBC AUTO: 87.8 FL (ref 80–100)
MONOCYTES ABSOLUTE: 0.9 K/UL (ref 0–1.3)
MONOCYTES RELATIVE PERCENT: 6.3 %
NEUTROPHILS ABSOLUTE: 10.7 K/UL (ref 1.7–7.7)
NEUTROPHILS RELATIVE PERCENT: 78.4 %
PDW BLD-RTO: 15.5 % (ref 12.4–15.4)
PERFORMED ON: ABNORMAL
PHOSPHORUS: 2.5 MG/DL (ref 2.5–4.9)
PLATELET # BLD: 293 K/UL (ref 135–450)
PMV BLD AUTO: 7.3 FL (ref 5–10.5)
POTASSIUM SERPL-SCNC: 3.8 MMOL/L (ref 3.5–5.1)
RBC # BLD: 2.87 M/UL (ref 4–5.2)
SODIUM BLD-SCNC: 139 MMOL/L (ref 136–145)
WBC # BLD: 13.6 K/UL (ref 4–11)

## 2021-03-02 PROCEDURE — 97530 THERAPEUTIC ACTIVITIES: CPT

## 2021-03-02 PROCEDURE — 2580000003 HC RX 258: Performed by: STUDENT IN AN ORGANIZED HEALTH CARE EDUCATION/TRAINING PROGRAM

## 2021-03-02 PROCEDURE — 36415 COLL VENOUS BLD VENIPUNCTURE: CPT

## 2021-03-02 PROCEDURE — 6370000000 HC RX 637 (ALT 250 FOR IP): Performed by: STUDENT IN AN ORGANIZED HEALTH CARE EDUCATION/TRAINING PROGRAM

## 2021-03-02 PROCEDURE — 2700000000 HC OXYGEN THERAPY PER DAY

## 2021-03-02 PROCEDURE — 6360000002 HC RX W HCPCS: Performed by: STUDENT IN AN ORGANIZED HEALTH CARE EDUCATION/TRAINING PROGRAM

## 2021-03-02 PROCEDURE — 80069 RENAL FUNCTION PANEL: CPT

## 2021-03-02 PROCEDURE — 94761 N-INVAS EAR/PLS OXIMETRY MLT: CPT

## 2021-03-02 PROCEDURE — 99024 POSTOP FOLLOW-UP VISIT: CPT | Performed by: SURGERY

## 2021-03-02 PROCEDURE — 83735 ASSAY OF MAGNESIUM: CPT

## 2021-03-02 PROCEDURE — 6370000000 HC RX 637 (ALT 250 FOR IP): Performed by: NURSE PRACTITIONER

## 2021-03-02 PROCEDURE — 94640 AIRWAY INHALATION TREATMENT: CPT

## 2021-03-02 PROCEDURE — 1200000000 HC SEMI PRIVATE

## 2021-03-02 PROCEDURE — 2500000003 HC RX 250 WO HCPCS: Performed by: STUDENT IN AN ORGANIZED HEALTH CARE EDUCATION/TRAINING PROGRAM

## 2021-03-02 PROCEDURE — 94669 MECHANICAL CHEST WALL OSCILL: CPT

## 2021-03-02 PROCEDURE — 85025 COMPLETE CBC W/AUTO DIFF WBC: CPT

## 2021-03-02 PROCEDURE — 6370000000 HC RX 637 (ALT 250 FOR IP): Performed by: SURGERY

## 2021-03-02 PROCEDURE — 6360000002 HC RX W HCPCS: Performed by: SURGERY

## 2021-03-02 RX ORDER — POTASSIUM CHLORIDE 20 MEQ/1
20 TABLET, EXTENDED RELEASE ORAL ONCE
Status: COMPLETED | OUTPATIENT
Start: 2021-03-02 | End: 2021-03-02

## 2021-03-02 RX ORDER — AMLODIPINE BESYLATE 10 MG/1
10 TABLET ORAL DAILY
Status: DISCONTINUED | OUTPATIENT
Start: 2021-03-03 | End: 2021-03-05 | Stop reason: HOSPADM

## 2021-03-02 RX ORDER — MAGNESIUM SULFATE IN WATER 40 MG/ML
2000 INJECTION, SOLUTION INTRAVENOUS ONCE
Status: COMPLETED | OUTPATIENT
Start: 2021-03-02 | End: 2021-03-02

## 2021-03-02 RX ORDER — AMLODIPINE BESYLATE 5 MG/1
5 TABLET ORAL ONCE
Status: COMPLETED | OUTPATIENT
Start: 2021-03-03 | End: 2021-03-02

## 2021-03-02 RX ORDER — BRIMONIDINE TARTRATE 2 MG/ML
1 SOLUTION/ DROPS OPHTHALMIC 2 TIMES DAILY
Status: DISCONTINUED | OUTPATIENT
Start: 2021-03-02 | End: 2021-03-05 | Stop reason: HOSPADM

## 2021-03-02 RX ORDER — LATANOPROST 50 UG/ML
1 SOLUTION/ DROPS OPHTHALMIC NIGHTLY
Status: DISCONTINUED | OUTPATIENT
Start: 2021-03-02 | End: 2021-03-05 | Stop reason: HOSPADM

## 2021-03-02 RX ORDER — DORZOLAMIDE HCL 20 MG/ML
1 SOLUTION/ DROPS OPHTHALMIC 3 TIMES DAILY
Status: DISCONTINUED | OUTPATIENT
Start: 2021-03-02 | End: 2021-03-05 | Stop reason: HOSPADM

## 2021-03-02 RX ADMIN — ALBUTEROL SULFATE 2.5 MG: 2.5 SOLUTION RESPIRATORY (INHALATION) at 04:46

## 2021-03-02 RX ADMIN — POTASSIUM CHLORIDE 20 MEQ: 1500 TABLET, EXTENDED RELEASE ORAL at 08:25

## 2021-03-02 RX ADMIN — MONTELUKAST 10 MG: 10 TABLET, FILM COATED ORAL at 08:25

## 2021-03-02 RX ADMIN — INSULIN LISPRO 3 UNITS: 100 INJECTION, SOLUTION INTRAVENOUS; SUBCUTANEOUS at 17:21

## 2021-03-02 RX ADMIN — INSULIN LISPRO 3 UNITS: 100 INJECTION, SOLUTION INTRAVENOUS; SUBCUTANEOUS at 10:30

## 2021-03-02 RX ADMIN — ACETAMINOPHEN 1000 MG: 500 TABLET, COATED ORAL at 23:22

## 2021-03-02 RX ADMIN — HYDRALAZINE HYDROCHLORIDE 5 MG: 20 INJECTION INTRAMUSCULAR; INTRAVENOUS at 03:27

## 2021-03-02 RX ADMIN — METOPROLOL TARTRATE 25 MG: 25 TABLET, FILM COATED ORAL at 20:01

## 2021-03-02 RX ADMIN — Medication 10 ML: at 20:02

## 2021-03-02 RX ADMIN — DORZOLAMIDE HYDROCHLORIDE 1 DROP: 20 SOLUTION/ DROPS OPHTHALMIC at 20:06

## 2021-03-02 RX ADMIN — METOPROLOL TARTRATE 25 MG: 25 TABLET, FILM COATED ORAL at 08:25

## 2021-03-02 RX ADMIN — ALBUTEROL SULFATE 2.5 MG: 2.5 SOLUTION RESPIRATORY (INHALATION) at 08:15

## 2021-03-02 RX ADMIN — ALBUTEROL SULFATE 2.5 MG: 2.5 SOLUTION RESPIRATORY (INHALATION) at 23:24

## 2021-03-02 RX ADMIN — ACETAMINOPHEN 1000 MG: 500 TABLET, COATED ORAL at 06:25

## 2021-03-02 RX ADMIN — BRIMONIDINE TARTRATE 1 DROP: 2 SOLUTION OPHTHALMIC at 20:07

## 2021-03-02 RX ADMIN — DORZOLAMIDE HYDROCHLORIDE 1 DROP: 20 SOLUTION/ DROPS OPHTHALMIC at 15:25

## 2021-03-02 RX ADMIN — ENOXAPARIN SODIUM 40 MG: 40 INJECTION SUBCUTANEOUS at 08:24

## 2021-03-02 RX ADMIN — LATANOPROST 1 DROP: 50 SOLUTION OPHTHALMIC at 20:05

## 2021-03-02 RX ADMIN — SERTRALINE HYDROCHLORIDE 50 MG: 50 TABLET ORAL at 08:25

## 2021-03-02 RX ADMIN — POTASSIUM PHOSPHATE, MONOBASIC AND POTASSIUM PHOSPHATE, DIBASIC 15 MMOL: 224; 236 INJECTION, SOLUTION, CONCENTRATE INTRAVENOUS at 10:51

## 2021-03-02 RX ADMIN — Medication 3 MG: at 23:22

## 2021-03-02 RX ADMIN — ACETAMINOPHEN 1000 MG: 500 TABLET, COATED ORAL at 17:32

## 2021-03-02 RX ADMIN — AMLODIPINE BESYLATE 5 MG: 5 TABLET ORAL at 23:59

## 2021-03-02 RX ADMIN — INSULIN LISPRO 2 UNITS: 100 INJECTION, SOLUTION INTRAVENOUS; SUBCUTANEOUS at 20:02

## 2021-03-02 RX ADMIN — HYDRALAZINE HYDROCHLORIDE 5 MG: 20 INJECTION INTRAMUSCULAR; INTRAVENOUS at 19:33

## 2021-03-02 RX ADMIN — MAGNESIUM SULFATE HEPTAHYDRATE 2000 MG: 40 INJECTION, SOLUTION INTRAVENOUS at 08:27

## 2021-03-02 RX ADMIN — OXYCODONE 5 MG: 5 TABLET ORAL at 03:32

## 2021-03-02 RX ADMIN — INSULIN LISPRO 3 UNITS: 100 INJECTION, SOLUTION INTRAVENOUS; SUBCUTANEOUS at 12:19

## 2021-03-02 RX ADMIN — PRAVASTATIN SODIUM 20 MG: 20 TABLET ORAL at 08:24

## 2021-03-02 ASSESSMENT — PAIN SCALES - GENERAL
PAINLEVEL_OUTOF10: 0

## 2021-03-02 ASSESSMENT — PAIN DESCRIPTION - PAIN TYPE: TYPE: ACUTE PAIN

## 2021-03-02 ASSESSMENT — PAIN DESCRIPTION - ORIENTATION: ORIENTATION: RIGHT;LEFT

## 2021-03-02 ASSESSMENT — PAIN DESCRIPTION - PROGRESSION: CLINICAL_PROGRESSION: NOT CHANGED

## 2021-03-02 NOTE — PROGRESS NOTES
Surgery Daily Progress Note      CC: Colon adenocarcinoma    SUBJECTIVE:  No acute events overnight. Remains hypertensive, improving; weaned to 1L NC overnight. Continues to note dizziness upon standing despite transfusion 1U PRBC. Reports that she has similar episodes at home. ROS:   A 14 point review of systems was conducted, significant findings as noted above. All other systems negative. OBJECTIVE:    PHYSICAL EXAM:  Vitals:    03/02/21 0322 03/02/21 0339 03/02/21 0436 03/02/21 0446   BP: (!) 164/95  (!) 154/95    Pulse: 82      Resp: 20   29   Temp: 98.4 °F (36.9 °C)      TempSrc: Oral      SpO2: 99% 97%  97%   Weight:       Height:           General appearance: Alert, no acute distress, well-developed, well-nourished  HEENT: Normocephalic, extraocular movements grossly intact, moist mucous membranes  Chest/Lungs: normal inspiratory effort, symmetric chest rise, no accessory muscle use  Cardiovascular: Regular rate and rhythm, no murmurs  Abdomen: Soft, obese, appropriately tender to palpation throughout, incisions clean/dry/intact  Neuro: A&Ox3, no gross motor or sensory neuro deficits  Extremities: no edema, no cyanosis      ASSESSMENT & PLAN:   Marybeth Moise is a 65 y.o. female with history of DM who presented to [facility] on 2/26 for elective surgery for her descending colon cancer, undergoing hand/robotic-assisted laparoscopic left colon resection 2/26, POD #4.    - Continue regular diet  - Continue home medications  - Continue PO pain medication  - Encouraged OOB/ambulation  - PT/OT with 17 and 17 /24 respectively; okay for home discharge.   - Possible discharge later today    Keara Chappell MD, PGY-1  03/02/21  6:12 AM  [phone]

## 2021-03-02 NOTE — PROGRESS NOTES
Occupational Therapy  Facility/Department: [facility]  Daily Treatment Note  NAME: Jacque Jimenez  : 1950  MRN: [id number]    Date of Service: 3/2/2021    Discharge Recommendations: Jacque Jimenez scored a 14/24 on the AM-PAC ADL Inpatient form. Current research shows that an AM-PAC score of 17 or less is typically not associated with a discharge to the patient's home setting. Based on the patient's AM-PAC score and their current ADL deficits, it is recommended that the patient have 3-5 sessions per week of Occupational Therapy at d/c to increase the patient's independence. Please see assessment section for further patient specific details. If patient discharges prior to next session this note will serve as a discharge summary. Please see below for the latest assessment towards goals. OT Equipment Recommendations  Equipment Needed: (cont. to assess or defer)  Other: Pt would benefit from RTS w/ handles, as well as AE (toilet aide, reacher, etc.)    Assessment   Performance deficits / Impairments: Decreased functional mobility ; Decreased ADL status; Decreased endurance;Decreased balance;Decreased strength  Assessment: Pt's participation significantly decreased since yesterday's tx, pt c/o significant dizziness & feverish symptoms when attempting EOB. Not safe to pursue functional mobility this date 2/2 BP changes & pt's symptoms. This therapist recommends IP OT services to maximize functional independence & safety, as pt lives w/ daughter who cannot provide 24 hr A. Pt declining placement, saying \"I don't want the Covid. \" She is functioning far below baseline; major safety concerns for DC home w/ current level of assist. If home, she will need 24 hr hands-on assist & HHOT (S3), cont. to assess as pt's medical issues are resolved.   Treatment Diagnosis: Impaired ADLs & transfers  OT Education: OT Role;Plan of Care;Energy Conservation(& recommendations for assistance w/ toileting hygiene & hands-on for all transfers, if home)  Patient Education: pt verbalized understanding of all; continue to reinforce  Activity Tolerance  Activity Tolerance: Patient limited by fatigue;Treatment limited secondary to medical complications (free text)  Activity Tolerance: BP at rest = 120/91. BP EOB = 180s/90s. SpO2=98-99% throughout session, HR 90s. Not safe to attempt functional mobility 2/2 significant dizziness and BP changes w/ bed mobility. Discussed with RN. Safety Devices  Safety Devices in place: Yes  Type of devices: Bed alarm in place;Call light within reach; Left in bed;Nurse notified(A-boots on)         Patient Diagnosis(es): The primary encounter diagnosis was Post-op pain. A diagnosis of Cancer of descending colon [facility]) was also pertinent to this visit. has a past medical history of Allergic rhinitis, Asthma, Back pain, GI bleeding, Glaucoma, Hypertension, Knee pain, Morbid obesity due to excess calories ([address] Osteoporosis, and Type II or unspecified type diabetes mellitus without mention of complication, not stated as uncontrolled. has a past surgical history that includes Colonoscopy (9/27/2014); knee surgery (Left); Hysterectomy; Colonoscopy (1/30/2020); Colonoscopy (1/30/2020); and Small intestine surgery (Left, 2/26/2021). Restrictions  Position Activity Restriction  Other position/activity restrictions: up w/ A, ambulate pt; abdominal binder  Subjective   General  Chart Reviewed: Yes  Patient assessed for rehabilitation services?: Yes  Additional Pertinent Hx: Hx = asthma, HTN, DM  Response to previous treatment: Patient with no complaints from previous session  Family / Caregiver Present: No  Diagnosis: 2/26 scheduled OR Robotic-assisted laparoscopic left colectomy with colocolonic anastomosis. Complications: Intraabdominal bleeding requiring one unit of packed redblood cells.   Subjective  Subjective: Pt in bed on arrival, c/o dizziness and appearing ill, SOB at rest. Pleasant and agreeable to therapy.   Intervention List: Patient able to continue with treatment;Nurse/Physician notified  Patient Currently in Pain: Other (comment)(pt reporting headache & discomfort in head&eyes, did not rate)   Orientation  Orientation  Overall Orientation Status: Within Functional Limits  Objective    ADL  Toileting: (pt using Purewick 2/2 urinary frequency)        Balance  Sitting Balance: (CGA; pt c/o dizziness & w/ posterior lean)  Functional Mobility  Functional Mobility Comments: not safe to attempt this session 2/2 dizziness & fluctuating BP  Bed mobility  Supine to Sit: Minimal assistance(assist to sit up trunk, HOB elevated)  Sit to Supine: Minimal assistance(assist w/ one leg)  Scooting: Minimal assistance(increased difficulty this date)  Comment: able to assist w/ hands & legs to boost self to head bed in supine, still req. x2 assist                              Plan   Plan  Times per week: 2-5x  Times per day: Daily  Current Treatment Recommendations: Strengthening, Functional Mobility Training, Endurance Training, Safety Education & Training, Self-Care / ADL  G-Code     OutComes Score                                                  AM-PAC Score        AM-Merged with [facility] Inpatient Daily Activity Raw Score: 14 (03/02/21 1611)  AM-PAC Inpatient ADL T-Scale Score : 33.39 (03/02/21 1611)  ADL Inpatient CMS 0-100% Score: 59.67 (03/02/21 1611)  ADL Inpatient CMS G-Code Modifier : CK (03/02/21 1611)    Goals  Short term goals  Time Frame for Short term goals: discharge  Short term goal 1: Pt will complete toilet hygiene at SBA level - not met  Short term goal 2: Pt will complete toilet transfer at SBA level - not met  Short term goal 3: Pt will participate in functional standing task, >2 min at SUP level - not met  Short term goal 4: Pt will participate in LE dressing assessment - not met  Patient Goals   Patient goals : none stated       Therapy Time   Individual Concurrent Group Co-treatment   Time In [phone] Time Out 5087         Minutes 23              Therapist was present, directed the patient's care, made skilled judgement, was responsible for assessment and treatment of the patient.     Celina Villalta, s/OT  Marcin Wilcox, FIONAR/[id number]

## 2021-03-02 NOTE — PROGRESS NOTES
Patient is Aox4 and able to make needs known; compliant with all medications as ordered; VSS, hypertensive at times, RRR; continues to experience dizziness with no benefit from medications; bowel sounds audible 4q; call light within reach; bed alarm engaged; speciality mattress in place; will continue to monitor.     Electronically signed by Kim Betts RN on 3/2/2021 at 6:47 PM

## 2021-03-02 NOTE — PLAN OF CARE
Problem: Falls - Risk of:  Goal: Will remain free from falls  Description: Will remain free from falls  Outcome: Ongoing  Note: Call light within reach; bed alarm engaged     Problem: Pain:  Goal: Pain level will decrease  Description: Pain level will decrease  Outcome: Ongoing  Note: Patient alerts RN of increasing pain; RN administers pain medication as ordered

## 2021-03-02 NOTE — CARE COORDINATION
CM following, pt will DC [facility], Hgb 8.4 today, would benefit from re-evaluation from PT OT for home care needs.   Electronically signed by Joss Oneal RN on 3/2/2021 at 1:08 PM  [phone]

## 2021-03-02 NOTE — PROGRESS NOTES
Pt HTN, prn an scheduled medicine given, MD made aware and Norvasc one time was also given. Pt weaned down to 1L oxygen with 97% sat. Fall precaution in place, purewick used for voiding d/t pt's SOB when ambulating to the bathroom to void. Surgical sites CDI, pain managed with medication. Call light was used for help, no further needs at this time, will continue to monitor.

## 2021-03-03 LAB
ALBUMIN SERPL-MCNC: 2.9 G/DL (ref 3.4–5)
ANION GAP SERPL CALCULATED.3IONS-SCNC: 13 MMOL/L (ref 3–16)
BASOPHILS ABSOLUTE: 0 K/UL (ref 0–0.2)
BASOPHILS RELATIVE PERCENT: 0.4 %
BUN BLDV-MCNC: 8 MG/DL (ref 7–20)
CALCIUM SERPL-MCNC: 8.3 MG/DL (ref 8.3–10.6)
CHLORIDE BLD-SCNC: 99 MMOL/L (ref 99–110)
CO2: 23 MMOL/L (ref 21–32)
CREAT SERPL-MCNC: <0.5 MG/DL (ref 0.6–1.2)
EOSINOPHILS ABSOLUTE: 0.1 K/UL (ref 0–0.6)
EOSINOPHILS RELATIVE PERCENT: 0.6 %
GFR AFRICAN AMERICAN: >60
GFR NON-AFRICAN AMERICAN: >60
GLUCOSE BLD-MCNC: 121 MG/DL (ref 70–99)
GLUCOSE BLD-MCNC: 150 MG/DL (ref 70–99)
GLUCOSE BLD-MCNC: 182 MG/DL (ref 70–99)
GLUCOSE BLD-MCNC: 187 MG/DL (ref 70–99)
GLUCOSE BLD-MCNC: 197 MG/DL (ref 70–99)
HCT VFR BLD CALC: 29 % (ref 36–48)
HEMOGLOBIN: 9.2 G/DL (ref 12–16)
LYMPHOCYTES ABSOLUTE: 1.6 K/UL (ref 1–5.1)
LYMPHOCYTES RELATIVE PERCENT: 11.8 %
MAGNESIUM: 1.9 MG/DL (ref 1.8–2.4)
MCH RBC QN AUTO: 29.1 PG (ref 26–34)
MCHC RBC AUTO-ENTMCNC: 31.7 G/DL (ref 31–36)
MCV RBC AUTO: 91.7 FL (ref 80–100)
MONOCYTES ABSOLUTE: 1 K/UL (ref 0–1.3)
MONOCYTES RELATIVE PERCENT: 7.5 %
NEUTROPHILS ABSOLUTE: 10.5 K/UL (ref 1.7–7.7)
NEUTROPHILS RELATIVE PERCENT: 79.7 %
PDW BLD-RTO: 15.3 % (ref 12.4–15.4)
PERFORMED ON: ABNORMAL
PHOSPHORUS: 2.2 MG/DL (ref 2.5–4.9)
PLATELET # BLD: 356 K/UL (ref 135–450)
PMV BLD AUTO: 7 FL (ref 5–10.5)
POTASSIUM SERPL-SCNC: 4.1 MMOL/L (ref 3.5–5.1)
RBC # BLD: 3.16 M/UL (ref 4–5.2)
SODIUM BLD-SCNC: 135 MMOL/L (ref 136–145)
WBC # BLD: 13.2 K/UL (ref 4–11)

## 2021-03-03 PROCEDURE — 6370000000 HC RX 637 (ALT 250 FOR IP): Performed by: STUDENT IN AN ORGANIZED HEALTH CARE EDUCATION/TRAINING PROGRAM

## 2021-03-03 PROCEDURE — 99024 POSTOP FOLLOW-UP VISIT: CPT | Performed by: SURGERY

## 2021-03-03 PROCEDURE — 6360000002 HC RX W HCPCS: Performed by: SURGERY

## 2021-03-03 PROCEDURE — 97530 THERAPEUTIC ACTIVITIES: CPT

## 2021-03-03 PROCEDURE — 94669 MECHANICAL CHEST WALL OSCILL: CPT

## 2021-03-03 PROCEDURE — 97116 GAIT TRAINING THERAPY: CPT

## 2021-03-03 PROCEDURE — 1200000000 HC SEMI PRIVATE

## 2021-03-03 PROCEDURE — 36415 COLL VENOUS BLD VENIPUNCTURE: CPT

## 2021-03-03 PROCEDURE — 6370000000 HC RX 637 (ALT 250 FOR IP): Performed by: NURSE PRACTITIONER

## 2021-03-03 PROCEDURE — 83735 ASSAY OF MAGNESIUM: CPT

## 2021-03-03 PROCEDURE — 80069 RENAL FUNCTION PANEL: CPT

## 2021-03-03 PROCEDURE — 6360000002 HC RX W HCPCS: Performed by: STUDENT IN AN ORGANIZED HEALTH CARE EDUCATION/TRAINING PROGRAM

## 2021-03-03 PROCEDURE — 97535 SELF CARE MNGMENT TRAINING: CPT

## 2021-03-03 PROCEDURE — 6370000000 HC RX 637 (ALT 250 FOR IP): Performed by: SURGERY

## 2021-03-03 PROCEDURE — 94640 AIRWAY INHALATION TREATMENT: CPT

## 2021-03-03 PROCEDURE — 85025 COMPLETE CBC W/AUTO DIFF WBC: CPT

## 2021-03-03 PROCEDURE — 94150 VITAL CAPACITY TEST: CPT

## 2021-03-03 PROCEDURE — 2580000003 HC RX 258: Performed by: STUDENT IN AN ORGANIZED HEALTH CARE EDUCATION/TRAINING PROGRAM

## 2021-03-03 RX ORDER — HYDRALAZINE HYDROCHLORIDE 20 MG/ML
10 INJECTION INTRAMUSCULAR; INTRAVENOUS EVERY 6 HOURS
Status: DISCONTINUED | OUTPATIENT
Start: 2021-03-03 | End: 2021-03-05 | Stop reason: HOSPADM

## 2021-03-03 RX ORDER — CALCIUM CARBONATE 200(500)MG
500 TABLET,CHEWABLE ORAL 3 TIMES DAILY PRN
Status: DISCONTINUED | OUTPATIENT
Start: 2021-03-03 | End: 2021-03-05 | Stop reason: HOSPADM

## 2021-03-03 RX ORDER — METOCLOPRAMIDE HYDROCHLORIDE 5 MG/ML
5 INJECTION INTRAMUSCULAR; INTRAVENOUS ONCE
Status: COMPLETED | OUTPATIENT
Start: 2021-03-03 | End: 2021-03-03

## 2021-03-03 RX ORDER — HYDRALAZINE HYDROCHLORIDE 20 MG/ML
5 INJECTION INTRAMUSCULAR; INTRAVENOUS EVERY 6 HOURS
Status: DISCONTINUED | OUTPATIENT
Start: 2021-03-03 | End: 2021-03-03

## 2021-03-03 RX ADMIN — BRIMONIDINE TARTRATE 1 DROP: 2 SOLUTION OPHTHALMIC at 09:59

## 2021-03-03 RX ADMIN — ACETAMINOPHEN 1000 MG: 500 TABLET, COATED ORAL at 21:15

## 2021-03-03 RX ADMIN — ACETAMINOPHEN 1000 MG: 500 TABLET, COATED ORAL at 09:57

## 2021-03-03 RX ADMIN — LATANOPROST 1 DROP: 50 SOLUTION OPHTHALMIC at 21:17

## 2021-03-03 RX ADMIN — ALBUTEROL SULFATE 2.5 MG: 2.5 SOLUTION RESPIRATORY (INHALATION) at 20:39

## 2021-03-03 RX ADMIN — ENOXAPARIN SODIUM 40 MG: 40 INJECTION SUBCUTANEOUS at 09:57

## 2021-03-03 RX ADMIN — DORZOLAMIDE HYDROCHLORIDE 1 DROP: 20 SOLUTION/ DROPS OPHTHALMIC at 17:12

## 2021-03-03 RX ADMIN — MONTELUKAST 10 MG: 10 TABLET, FILM COATED ORAL at 09:57

## 2021-03-03 RX ADMIN — Medication 10 ML: at 21:30

## 2021-03-03 RX ADMIN — DORZOLAMIDE HYDROCHLORIDE 1 DROP: 20 SOLUTION/ DROPS OPHTHALMIC at 21:17

## 2021-03-03 RX ADMIN — ALBUTEROL SULFATE 2.5 MG: 2.5 SOLUTION RESPIRATORY (INHALATION) at 07:41

## 2021-03-03 RX ADMIN — DORZOLAMIDE HYDROCHLORIDE 1 DROP: 20 SOLUTION/ DROPS OPHTHALMIC at 09:59

## 2021-03-03 RX ADMIN — PRAVASTATIN SODIUM 20 MG: 20 TABLET ORAL at 09:58

## 2021-03-03 RX ADMIN — METOPROLOL TARTRATE 25 MG: 25 TABLET, FILM COATED ORAL at 09:58

## 2021-03-03 RX ADMIN — ACETAMINOPHEN 1000 MG: 500 TABLET, COATED ORAL at 03:57

## 2021-03-03 RX ADMIN — INSULIN LISPRO 3 UNITS: 100 INJECTION, SOLUTION INTRAVENOUS; SUBCUTANEOUS at 17:12

## 2021-03-03 RX ADMIN — HYDRALAZINE HYDROCHLORIDE 10 MG: 20 INJECTION INTRAMUSCULAR; INTRAVENOUS at 09:58

## 2021-03-03 RX ADMIN — ALBUTEROL SULFATE 2.5 MG: 2.5 SOLUTION RESPIRATORY (INHALATION) at 15:18

## 2021-03-03 RX ADMIN — HYDRALAZINE HYDROCHLORIDE 5 MG: 20 INJECTION INTRAMUSCULAR; INTRAVENOUS at 03:57

## 2021-03-03 RX ADMIN — ANTACID TABLETS 500 MG: 500 TABLET, CHEWABLE ORAL at 06:35

## 2021-03-03 RX ADMIN — INSULIN LISPRO 3 UNITS: 100 INJECTION, SOLUTION INTRAVENOUS; SUBCUTANEOUS at 12:32

## 2021-03-03 RX ADMIN — Medication 10 ML: at 09:59

## 2021-03-03 RX ADMIN — AMLODIPINE BESYLATE 10 MG: 10 TABLET ORAL at 09:58

## 2021-03-03 RX ADMIN — MECLIZINE HYDROCHLORIDE 25 MG: 25 TABLET ORAL at 05:31

## 2021-03-03 RX ADMIN — DIBASIC SODIUM PHOSPHATE, MONOBASIC POTASSIUM PHOSPHATE AND MONOBASIC SODIUM PHOSPHATE 2 TABLET: 852; 155; 130 TABLET ORAL at 12:32

## 2021-03-03 RX ADMIN — DIBASIC SODIUM PHOSPHATE, MONOBASIC POTASSIUM PHOSPHATE AND MONOBASIC SODIUM PHOSPHATE 2 TABLET: 852; 155; 130 TABLET ORAL at 21:15

## 2021-03-03 RX ADMIN — METOPROLOL TARTRATE 25 MG: 25 TABLET, FILM COATED ORAL at 21:16

## 2021-03-03 RX ADMIN — SERTRALINE HYDROCHLORIDE 50 MG: 50 TABLET ORAL at 09:58

## 2021-03-03 RX ADMIN — INSULIN LISPRO 3 UNITS: 100 INJECTION, SOLUTION INTRAVENOUS; SUBCUTANEOUS at 07:37

## 2021-03-03 RX ADMIN — METOCLOPRAMIDE HYDROCHLORIDE 5 MG: 5 INJECTION INTRAMUSCULAR; INTRAVENOUS at 06:36

## 2021-03-03 RX ADMIN — HYDRALAZINE HYDROCHLORIDE 10 MG: 20 INJECTION INTRAMUSCULAR; INTRAVENOUS at 17:13

## 2021-03-03 RX ADMIN — BRIMONIDINE TARTRATE 1 DROP: 2 SOLUTION OPHTHALMIC at 21:17

## 2021-03-03 ASSESSMENT — ENCOUNTER SYMPTOMS
EYES NEGATIVE: 1
GASTROINTESTINAL NEGATIVE: 1
RESPIRATORY NEGATIVE: 1

## 2021-03-03 ASSESSMENT — PAIN SCALES - GENERAL
PAINLEVEL_OUTOF10: 8
PAINLEVEL_OUTOF10: 0
PAINLEVEL_OUTOF10: 0

## 2021-03-03 NOTE — PROGRESS NOTES
Physician Progress Note      Ramos Dwyer  CSN #:                  [id number]  :                       1950  ADMIT DATE:       2021 8:52 AM  DISCH DATE:  RESPONDING  PROVIDER #:        Rossi Mills MD          QUERY TEXT:    Pt s/p left colon resection . Pt noted to have WBC 13.5-13.8-16.4, HR   'S, LA 2.6-4.3 . If possible, please document in the progress notes and   discharge summary if you are evaluating and /or treating any of the following: The medical record reflects the following:  Risk Factors: s/p left colon resection , colon ca, morbid obesity, ABLA  Clinical Indicators: -WBC 13.5-13.8, LA 2.6-4.3, down to 1.9 after fluid   boluses, HR . Temp 99. 6-Tylenol given. Noted by general surgery: her   lactate levels were elevated and she was making low urine output. Thus a total   of 1500 IVF bolus was given, patient UOP improved, as did her lactate  The patient's serial lactate level was found to have increased to 4.3 from 2.6   this AM. Clinically, she does not have a concerning abdominal exam as she is   soft and appropriately tender for the POD1 setting. She is being progressively   weaned from supplemental oxygen and is hemodynamically stable within normal   limits. The patient will be provided with a 1 L bolus of plasmalyte and her   maintenance fluid will be transitioned to plasmalyte  On : WBC 16.4,   Treatment: Tylenol, Fluid boluses, 1L plasmalyte, PRBC, repeat CBC, repeat LA  Options provided:  -- Sepsis, not present on admission  -- SIRS of noninfectious origin  -- Other - I will add my own diagnosis  -- Disagree - Not applicable / Not valid  -- Disagree - Clinically unable to determine / Unknown  -- Refer to Clinical Documentation Reviewer    PROVIDER RESPONSE TEXT:    This patient has SIRS of noninfectious origin.     Query created by: Livan Martínez on 3/3/2021 6:19 AM Electronically signed by:  Mague Montgomery MD 3/3/2021 7:12 AM

## 2021-03-03 NOTE — CONSULTS
Consult Note            Date:3/3/2021        Patient Padmaja Nicolas     YOB: 1950     Age:70 y.o. Inpatient consult to Internal Medicine  Consult performed by: Ernie Steele MD  Consult ordered by: Josh Mooney MD  Reason for consult: Dizziness. Chief Complaint   Dizziness. History Obtained From   patient    History of Present Illness       64 y/o female p/w dizziness . Patient has a history of adenocarcinoma of colon, she was admitted for laparoscopic colectomy. Patient has been complaining of dizziness. She has been feeling dizzy over the last 6 months. Her dizziness is more like lightheadedness than spinning sensation. After the surgery her symptoms have more provoked in nature. She denies any chest pain. She denies any shortness of breath. She denies any nausea or vomiting. Her appetite is poor post surgery. She is moving her bowels. She has a history of hypertension, diabetes mellitus.     Past Medical History     Past Medical History:   Diagnosis Date    Allergic rhinitis     Asthma     Back pain     GI bleeding 12/18/2014    Glaucoma     both eyes    Hypertension     Knee pain     Morbid obesity due to excess calories ([address] 11/2/2015    Osteoporosis     Type II or unspecified type diabetes mellitus without mention of complication, not stated as uncontrolled         Past Surgical History     Past Surgical History:   Procedure Laterality Date    COLONOSCOPY  9/27/2014    polyp removed    COLONOSCOPY  1/30/2020    COLONOSCOPY POLYPECTOMY SNARE/COLD BIOPSY performed by Nara Pedraza MD at [address] [facility]  1/30/2020    COLONOSCOPY SUBMUCOSAL/BOTOX INJECTION performed by Nara Pedraza MD at [address] [facility] Left     knee scoped and \"cleaned out\"    SMALL INTESTINE SURGERY Left 2/26/2021    ROBOTIC VERSUS LAPAROSCOPIC LEFT COLECTOMY performed by Hermes Dunlap MD at [address] Medications     Prior to Admission medications    Medication Sig Start Date End Date Taking? Authorizing Provider   oxyCODONE (ROXICODONE) 5 MG immediate release tablet Take 1 tablet by mouth every 6 hours as needed for Pain for up to 3 days.  3/1/21 3/4/21 Yes DAVID Grewal - CNP   ondansetron (ZOFRAN-ODT) 4 MG disintegrating tablet Take 1 tablet by mouth every 8 hours as needed for Nausea or Vomiting 3/1/21  Yes DAVID Grewal CNP   metoprolol tartrate (LOPRESSOR) 25 MG tablet TAKE 1 TABLET (25MG) BY MOUTH 2 TIMES A DAY (BLOOD PRESSURE) 2/22/21  Yes DAVID Martinez   Multiple Vitamins-Minerals (MULTIVITAMIN WITH MINERALS) tablet Take 1 tablet by mouth daily 1/1/21  Yes Carlos Topete MD   vitamin D (ERGOCALCIFEROL) 1.25 MG (63041 UT) CAPS capsule TAKE 1 CAPSULE BY MOUTH EVERY MONTH 12/29/20  Yes Carlos Topete MD   atorvastatin (LIPITOR) 40 MG tablet TAKE 1 TABLET (40MG) BY MOUTH EVERY DAY (CHOLESTEROL) 12/4/20  Yes Carlos Topete MD   amLODIPine (NORVASC) 10 MG tablet TAKE 1 TABLET BY MOUTH EVERY DAY 12/4/20  Yes Carlos Topete MD   metFORMIN (GLUCOPHAGE) 1000 MG tablet TAKE 1 TABLET (1000MG) BY MOUTH 2 TIMES A DAY WITH MEALS (BLOOD SUGAR/DIABETES) 9/21/20  Yes Carlos Topete MD   sertraline (ZOLOFT) 50 MG tablet TAKE 2 TABLETS (100MG) BY MOUTH 2 TIMES A DAY (MOOD) 2/22/21   DAVID Martinez   insulin NPH (HUMULIN N) 100 UNIT/ML injection vial Inject 13 units twice a day under skin 2/1/21   Carlos Topete MD   famotidine (PEPCID) 20 MG tablet One a day 1/1/21   Carlos Topete MD   hydroCHLOROthiazide (HYDRODIURIL) 25 MG tablet TAKE 1 TABLET (25MG) BY MOUTH EVERY DAY (DIURECTIC/ BLOOD PRESSURE) 1/1/21   Carlos Topete MD   fluticasone Henry Ask) 50 MCG/ACT nasal spray 1 spray by Nasal route daily 12/28/20 12/28/21  Carlos Topete MD   montelukast (SINGULAIR) 10 MG tablet TAKE 1 TABLET (10MG) BY MOUTH EVERY DAY 12/4/20   Carlos Topete MD   Exenatide (BYDUREON) 2 MG PEN Inject 1 pen into the skin every 7 days 12/4/20   Anusha Conde MD   alendronate (FOSAMAX) 70 MG tablet TAKE 1 TAB (70MG) BY MOUTH PER WK BEFORE BREAKFAST EMPTY STOMACH SIT U P 30MIN (BONES) 12/4/20   Anusha Conde MD   Tiotropium Bromide-Olodaterol (STIOLTO RESPIMAT) 2.5-2.5 MCG/ACT AERS 2 ouffs once a day 11/4/20   Anusha Conde MD   insulin regular (HUMULIN R) 100 UNIT/ML injection Inject 4 Units into the skin See Admin Instructions Before meals for blood sugars  > 150 9/21/20   Anusha Conde MD   blood glucose test strips (ACCU-CHEK BRAVO PLUS) strip USE TO TEST GLUCOSE THREE TIMES DAILY 9/21/20   Anusha Conde MD   tiZANidine (ZANAFLEX) 2 MG tablet One or two tabs prn bedtime 8/31/20   Anusha Conde MD   loratadine (ALLERGY RELIEF) 10 MG tablet TAKE 1 TABLET (10MG) BY MOUTH EVERY DAY (ALLERGIES) 7/27/20   Anusha Conde MD   albuterol sulfate  (90 Base) MCG/ACT inhaler INHALE 2 PUFFS BY MOUTH EVERY 6 HOURS AS NEEDED 6/30/20   Anusha Conde MD   Blood Pressure KIT Ck blood pressure once a week 4/17/20   Anusha Conde MD   pravastatin (PRAVACHOL) 20 MG tablet Take one tab a day cancel script pravastatin 10 mg 1/21/20   Anusha Conde MD   Blood Glucose Monitoring Suppl (TRUE METRIX METER) w/Device KIT Test glucose TID 12/2/19   Anusha Conde MD   acetaminophen (TYLENOL) 500 MG tablet Take 1 tablet by mouth 4 times daily as needed for Pain 9/9/19   Anusha Conde MD   albuterol (PROVENTIL) (2.5 MG/3ML) 0.083% nebulizer solution INHALE CONTENTS OF 1 VIAL (3ML) BY MOUTH VIA NEBULIZER 3 TIMES A DAY 9/9/19   Anusha Conde MD   B-D ULTRAFINE III SHORT PEN 31G X 8 MM MISC USE TO INJECT INSULIN (QID) 9/6/19   Anusha Conde MD   Alcohol Swabs PADS Use TID to test blood glucose 5/6/19   Anusha Conde MD   ACCU-CHEK SOFTCLIX LANCETS MISC USE TO TEST BLOOD SUGAR 3 TIMES A DAY 3/8/19   Anusha Conde MD   beclomethasone (QVAR) 80 MCG/ACT inhaler Inhale 2 puffs into the lungs 2 times daily 10/27/18   Anusha Conde, MD   Blood Glucose Monitoring Suppl GRETCHEN Dispense one true track meter   Test glucose twice a day 8/25/17   Diamante Tony MD   dorzolamide (TRUSOPT) 2 % ophthalmic solution  8/6/15   Historical Provider, MD   Bimatoprost (LUMIGAN) 0.01 % SOLN Apply  to eye. One drop in each eye at bedtime     Historical Provider, MD   brimonidine (ALPHAGAN P) 0.1 % SOLN 1 drop 2 times daily.  One drop in both eyes every 12 hours    Historical Provider, MD            calcium carbonate (TUMS) chewable tablet 500 mg, TID PRN      hydrALAZINE (APRESOLINE) injection 10 mg, Q6H      latanoprost (XALATAN) 0.005 % ophthalmic solution 1 drop, Nightly      brimonidine (ALPHAGAN) 0.2 % ophthalmic solution 1 drop, BID      dorzolamide (TRUSOPT) 2 % ophthalmic solution 1 drop, TID      amLODIPine (NORVASC) tablet 10 mg, Daily      montelukast (SINGULAIR) tablet 10 mg, Daily      pravastatin (PRAVACHOL) tablet 20 mg, Daily      sertraline (ZOLOFT) tablet 50 mg, Daily      meclizine (ANTIVERT) tablet 25 mg, TID PRN      0.9 % sodium chloride infusion, PRN      melatonin tablet 3 mg, Nightly PRN      [Held by provider] metoclopramide (REGLAN) injection 5 mg, Q6H      insulin lispro (1 Unit Dial) 0-18 Units, TID WC      insulin lispro (1 Unit Dial) 0-9 Units, Nightly      0.9 % sodium chloride infusion, PRN      albuterol (PROVENTIL) nebulizer solution 2.5 mg, TID      albuterol (PROVENTIL) nebulizer solution 2.5 mg, Q6H PRN      sodium chloride flush 0.9 % injection 10 mL, 2 times per day      sodium chloride flush 0.9 % injection 10 mL, PRN      ondansetron (ZOFRAN-ODT) disintegrating tablet 4 mg, Q8H PRN    Or      ondansetron (ZOFRAN) injection 4 mg, Q6H PRN      acetaminophen (TYLENOL) tablet 1,000 mg, Q6H      oxyCODONE (ROXICODONE) immediate release tablet 5 mg, Q6H PRN      glucose (GLUTOSE) 40 % oral gel 15 g, PRN      dextrose 50 % IV solution, PRN      glucagon (rDNA) injection 1 mg, PRN      dextrose 5 % solution, PRN      methocarbamol (ROBAXIN) tablet 500 mg, Q8H PRN      metoprolol tartrate (LOPRESSOR) tablet 25 mg, BID      enoxaparin (LOVENOX) injection 40 mg, Daily        Allergies   Pantoprazole sodium, Bactrim [sulfamethoxazole-trimethoprim], Enalapril, Lisinopril, Milk-related compounds, Pcn [penicillins], and Tape [adhesive tape]    Social History     Social History     Tobacco History     Smoking Status  Former Smoker Smoking Start Date  12/18/1984 Quit date  9/14/1993 Smoking Frequency  0.5 packs/day for 20 years (10 pk yrs)    Smoking Tobacco Type  Cigarettes    Smokeless Tobacco Use  Never Used          Alcohol History     Alcohol Use Status  No          Drug Use     Drug Use Status  No          Sexual Activity     Sexually Active  Not Asked                Family History     Family History   Problem Relation Age of Onset    Diabetes Mother     Cancer Father        Review of Systems   Review of Systems   Constitutional: Positive for appetite change. HENT: Negative. Eyes: Negative. Respiratory: Negative. Cardiovascular: Negative. Gastrointestinal: Negative. Genitourinary: Negative. Musculoskeletal: Negative. Neurological: Positive for dizziness and light-headedness. Hematological: Negative. Psychiatric/Behavioral: Negative. Physical Exam   BP (!) 163/79   Pulse 101   Temp 97.4 °F (36.3 °C) (Oral)   Resp 17   Ht 5' (1.524 m)   Wt 222 lb (100.7 kg)   SpO2 94%   BMI 43.36 kg/m²      Physical Exam  Constitutional:       Appearance: Normal appearance. HENT:      Head: Normocephalic and atraumatic. Nose: Nose normal.      Mouth/Throat:      Mouth: Mucous membranes are moist.   Neck:      Musculoskeletal: Normal range of motion. No neck rigidity. Cardiovascular:      Rate and Rhythm: Normal rate and regular rhythm. Pulses: Normal pulses. Heart sounds: Normal heart sounds.    Pulmonary:      Effort: Pulmonary effort is normal.      Breath sounds: Normal breath sounds. Abdominal:      General: Abdomen is flat. Palpations: Abdomen is soft. Musculoskeletal: Normal range of motion. Skin:     General: Skin is warm and dry. Capillary Refill: Capillary refill takes less than 2 seconds. Neurological:      General: No focal deficit present. Mental Status: She is alert and oriented to person, place, and time. Psychiatric:         Mood and Affect: Mood normal.         Behavior: Behavior normal.         Labs    CBC:  Recent Labs     03/01/21 0518 03/01/21 2034 03/02/21 0510 03/03/21 0522   WBC 15.4*  --  13.6* 13.2*   RBC 2.44*  --  2.87* 3.16*   HGB 7.0* 8.8* 8.4* 9.2*   HCT 21.5* 27.4* 25.2* 29.0*   MCV 88.1  --  87.8 91.7   RDW 15.1  --  15.5* 15.3     --  293 356     CHEMISTRIES:  Recent Labs     03/01/21 0518 03/02/21 0509 03/03/21 0522    139 135*   K 3.7 3.8 4.1    101 99   CO2 25 27 23   BUN 6* 5* 8   CREATININE 0.7 0.6 <0.5*   GLUCOSE 172* 176* 187*   PHOS 2.6 2.5 2.2*   MG 2.10 1.80 1.90     PT/INR:No results for input(s): PROTIME, INR in the last 72 hours. APTT:No results for input(s): APTT in the last 72 hours. LIVER PROFILE:No results for input(s): AST, ALT, BILIDIR, BILITOT, ALKPHOS in the last 72 hours. Imaging/Diagnostics   Xr Chest Portable    Result Date: 3/1/2021  1. Patchy bilateral lower lung reticular and airspace opacities suggesting bilateral pneumonia versus lower lung atelectasis.       Assessment      Dizziness/lightheadedness  Essential hypertension  Diabetes mellitus type 2  Colon adenocarcinoma s/p colectomy    Plan       Plan  Dizziness  Check orthostatics  No concerning cardiac complaints  No focal neurological deficit, ongoing for 6 months  Suspect secondary to deconditioning , anemia associated with the surgery    Essential hypertension  Continue hydralazine  Continue amlodipine  Check orthostatics      Diabetes mellitus type 2  At home on metformin  Continue with sliding scale

## 2021-03-03 NOTE — CARE COORDINATION
CM following, pt responded well to PRBC yesterday hgb 9.2 today, medicine consult placed for cont dizziness, ordered orthostatic BPs they were done and negative. Pt has been able to wean off O2. Pt will DC home with HHC at [place] and support of family.   Electronically signed by Bahman Guaman RN on 3/3/2021 at 11:59 AM  [phone]

## 2021-03-03 NOTE — PROGRESS NOTES
Physician Progress Note      Benjamin Crews  CSN #:                  [id number]  :                       1950  ADMIT DATE:       2021 8:52 AM  DISCH DATE:  RESPONDING  PROVIDER #:        Maricruz Doe DO          QUERY TEXT:    Dear Provider,    Pt underwent a left colectomy for colon cancer and repair of intraoperative   bleeding. Drop in H & H from 12.5/38.5 to 9.7/31.0. Currently Hgb 8.2/ Hct   25.4. If possible, please document in the progress notes and discharge   summary if you are evaluating and/or treating any of the following: The medical record reflects the following:  Risk Factors: left colectomy for colon cancer and repair of intraoperative   bleeding  Clinical Indicators: EBL: 800, Drop in H & H from 12.5/38.5 to 9.7/31.0. Currently Hgb 8.2/ Hct 25.4.   Per Operative note: We undid the pack again and noted the active   bleeding. Anesthesia was notified and they decided to go ahead and give 1   unit of blood given some mild hypotension. We talked to Dr. Corey Stiles itself and   we were able  to ligate the bleeder using LigaSure device in couple of different areas. We   ensured that it was completely ligated and there was no other areas of   concern. Treatment: 1 unit PRBC intraoperatively, Serial H & H, monitor for bleeding  Options provided:  -- Acute blood loss anemia  -- Postoperative acute blood loss anemia  -- Other - I will add my own diagnosis  -- Disagree - Not applicable / Not valid  -- Disagree - Clinically unable to determine / Unknown  -- Refer to Clinical Documentation Reviewer    PROVIDER RESPONSE TEXT:    This patient has acute blood loss anemia.     Query created by: Chanelle Shields on 2021 8:53 AM      Electronically signed by:  Maricruz Doe DO 3/2/2021 11:55 PM

## 2021-03-03 NOTE — PROGRESS NOTES
Physical Therapy  Facility/Department: [address] [place] [address] 5 [facility]  Daily Treatment Note  NAME: Thelma Godoy  : 1950  MRN: [id number]    Date of Service: 3/3/2021    Discharge Recommendations:Kianna Soliman scored a 17/24 on the AM-PAC short mobility form. Current research shows that an AM-PAC score of 17 or less is typically not associated with a discharge to the patient's home setting. Based on the patient's AM-PAC score and their current functional mobility deficits, it is recommended that the patient have 3-5 sessions per week of Physical Therapy at d/c to increase the patient's independence. Please see assessment section for further patient specific details. If patient discharges prior to next session this note will serve as a discharge summary. Please see below for the latest assessment towards goals. If home, HOME HEALTH CARE: LEVEL 1 STANDARD    - Initial home health evaluation to occur within 24-48 hours, in patient home   - Therapy to evaluate with goal of regaining prior level of functioning   - Therapy to evaluate if patient has [address] needs for personal care        PT Equipment Recommendations  Equipment Needed: No    Assessment   Assessment: Pt demo improved mobility and activity tolerance this date but still concerningly DIXON/SOB at rest and with activity. Pt also now requiring use of rolling walker. Pt unsure if family can provide 24 hour assist but states she plans to return home due to concerns of catching COVID at facility. If home, recommend 24 hour assist, home PT, use of rolling walker (has). Safety concerns for pt to be at home alone. Pt may benefit from continued skilled IP PT at discharge. Treatment Diagnosis: mobility impairment due to colon CA  Patient Education: Pt educated on PT role, importance of OOB mobility, need to call for assist to get up and she verbalized understanding.   REQUIRES PT FOLLOW UP: Yes  Activity Tolerance  Activity Tolerance: Patient limited by endurance; Patient limited by fatigue     Patient Diagnosis(es): The primary encounter diagnosis was Post-op pain. A diagnosis of Cancer of descending colon [facility]) was also pertinent to this visit. has a past medical history of Allergic rhinitis, Asthma, Back pain, GI bleeding, Glaucoma, Hypertension, Knee pain, Morbid obesity due to excess calories ([address] Osteoporosis, and Type II or unspecified type diabetes mellitus without mention of complication, not stated as uncontrolled. has a past surgical history that includes Colonoscopy (9/27/2014); knee surgery (Left); Hysterectomy; Colonoscopy (1/30/2020); Colonoscopy (1/30/2020); and Small intestine surgery (Left, 2/26/2021). Restrictions  Position Activity Restriction  Other position/activity restrictions: up w/ A, ambulate pt; abdominal binder     Subjective   General  Chart Reviewed: Yes  Additional Pertinent Hx: 65 y.o. female with history of DM who presented to [place] [address] on 2/26 for elective surgery for her descending colon cancer, undergoing hand/robotic-assisted laparoscopic left colon resection 2/26. Family / Caregiver Present: Yes(niece)  Subjective  Subjective: Pt found up in chair and agreeable to PT. \" I want to go to the nursing home but I'm afraid I'll get the COVID so I'm going home.  \"  Pain Screening  Patient Currently in Pain: Yes(c/o abdominal pain 2/10)         Orientation  Orientation  Overall Orientation Status: Within Functional Limits       Objective   Bed mobility  Sit to Supine: Stand by assistance  Scooting: Stand by assistance     Transfers  Sit to Stand: Contact guard assistance(x2 trials with vc for hand placement)  Stand to sit: Contact guard assistance(x2 trials with vc for hand placement)     Ambulation 1  Device: Rolling Walker  Assistance: Stand by assistance  Quality of Gait: slow jay with decreased step length/height; increased wt shift side to side; DIXON (sp02 95% on RA)  Distance: 15 ft, 25 ft  Comments: Pt limited

## 2021-03-03 NOTE — PROGRESS NOTES
Pt HTN, prn and scheduled medication given, MD aware. Pt's surgical sites CDI with abd binder in place. Pt c/o dizziness relieved with prn med, denies pain, n/v during this shift. Pt voiding and used purewick during this shift d/t c/o dizziness when getting OOB. Pt had x1 BM this morning. Fall precaution in place, used call light appropriately.

## 2021-03-03 NOTE — FLOWSHEET NOTE
03/03/21 1527   Encounter Summary   Services provided to: Patient and family together   Referral/Consult From: [facility] Family members   Continue Visiting   (3/3dayami )   Complexity of Encounter High   Length of Encounter 45 minutes   Routine   Type Initial   Assessment Calm; Approachable; Hopeful   Intervention Active listening;Explored feelings, thoughts, concerns;Nurtured hope;Prayer   Outcome Comfort;Expressed gratitude;Engaged in conversation   Advance Directives (For Healthcare)   Healthcare Directive Yes, patient has an advance directive for healthcare treatment   Type of Healthcare Directive Durable power of  for health care;Living will   Copy in Chart Yes, copy in chart   Chart Copy Status : Active;Current; Reviewed   Date Reviewed and Current: 03/03/21   Healthcare Agent Appointed Adult [address] [place] [address] Agent's Name   Malika Sky)   Healthcare Agent's Phone Number   ([phone])   If you are unable to speak for yourself, does your Healthcare Agent or Legal Spokesperson know your healthcare wishes?  Yes   Staff Max Ritchie MA

## 2021-03-03 NOTE — PROGRESS NOTES
Pt still complaining of dizziness with movement and lying still. Remains SOB, but >90% Rm air. Can desat into 80's with walking. Acapella at bedside-pt doing independently. Lap sites CDI and bowel tones audible. Passing gas. Currently up in chair. Therapy to work with her shortly.

## 2021-03-03 NOTE — PROGRESS NOTES
Occupational Therapy  Facility/Department: [facility]  Daily Treatment Note  NAME: Brittanie Voss  : 1950  MRN: [id number]    Date of Service: 3/3/2021    Discharge Recommendations: Brittanie Voss scored a 15/24 on the AM-PAC ADL Inpatient form. Current research shows that an AM-PAC score of 17 or less is typically not associated with a discharge to the patient's home setting. Based on the patient's AM-PAC score and their current ADL deficits, it is recommended that the patient have 3-5 sessions per week of Occupational Therapy at d/c to increase the patient's independence. Please see assessment section for further patient specific details. If patient discharges prior to next session this note will serve as a discharge summary. Please see below for the latest assessment towards goals. OT Equipment Recommendations  Equipment Needed: (cont. to assess or defer)  Other: Pt would benefit from RTS w/ handles, as well as AE (toilet aide, reacher, etc.)    Assessment   Performance deficits / Impairments: Decreased functional mobility ; Decreased ADL status; Decreased endurance;Decreased balance;Decreased strength  Assessment: Pt continuing to present far below baseline, requiring significant hands-on assist w/ LE ADLs & toileting hygiene. She was able to complete multiple trials of functional mobility, however requires seated rest after 1.5 min or less of activity, demonstrating heavy DIXON. She is more receptive to placement today; she would benefit from pt & family education on benefits of placement & safety concerns w/ DC home to prn assist from daughter. Recommend IP OT services to maximize functional independence & safety. If pt declines, she requires 24 hands-on assist & HHOT (S3). Cont. per POC.   Treatment Diagnosis: Impaired ADLs & transfers  Prognosis: Good  OT Education: OT Role;Plan of Care;ADL Adaptive Strategies;Transfer Training;Energy Conservation  Patient Education: pt of packed redblood cells. Subjective  Subjective: Pt up in chair on arrival, pleasant & agreeable to therapy. C/o dizziness: \"I need another minute of rest, baby. \" Main concern with placement is \"I don't want to catch the Covid\" but became more agreeable with lengthy discussion/education on benefits: \"I want you to talk about this with my daughter and have me in the room. \"  General Comment  Comments: Pt reports dizziness has been a baseline issue; she believes it may be related to her \"ear\" (re: vestibular), and is interested in having this assessed  Patient Currently in Pain: No   Orientation  Orientation  Overall Orientation Status: Within Functional Limits  Objective    ADL  Equipment Provided: (pt reports daughter purchased reacher; plans to purchase toilet aide at [place])  Feeding: Setup; Independent  Grooming: (hand-washing in seat, SUP)  LE Dressing: Maximum assistance(requires assist for threading & balance/some assist for pants management)  Toileting: Maximum assistance(pt using Purewick & Depends 2/2 frequency of bladder; had urine & diarrhea output on toilet (RN aware); req.  assist w/ thoroughness of toilet hygiene)        Balance  Sitting Balance: Stand by assistance(SBA-SUP EOB, c/o dizziness but stable)  Standing Balance: Contact guard assistance  Standing Balance  Time: up to 1.5 min, x3 bouts  Activity: sinkside ADL & room mobility  Comment: pt able to tolerate 1.5 minutes or less before demonstrating DIXON and requiring seated rest  Functional Mobility  Functional - Mobility Device: Rolling Walker  Activity: To/from bathroom(24' to door and back; 34' to window + toilet; bathroom > bed; bed > chair)  Assist Level: Contact guard assistance  Functional Mobility Comments: slow & requires max VC to keep RW close before attempting to sit  Toilet Transfers  Toilet - Technique: Ambulating  Equipment Used: Standard toilet(grab bar)  Toilet Transfer: Minimal assistance  Toilet Transfers Comments: requires small boost to stand from toilet  Bed mobility  Scooting: Stand by assistance  Comment: therapist emphasizing have pt up in chair & consequences on endurance if back to bed  Transfers  Sit to stand: Contact guard assistance  Stand to sit: Contact guard assistance  Transfer Comments: during final sit in recliner pt did not reach back for armrests & chair moved, pt landed safely in chair & therapist re-emphasized hand placement (poor demo throughout session)                       Cognition  Cognition Comment: no change: pt remains witty however she required max VC this session for safe hand placement & min VC for RW management prior to all stand>sit transfers. Pt reporting that she wears Depends at home and plans on sitting in her urine/feces 2/2 limited mobility; therapist emphasizing this is not a safe plan - decreased insight into deficits                                         Plan   Plan  Times per week: 2-5x  Times per day: Daily  Current Treatment Recommendations: Strengthening, Functional Mobility Training, Endurance Training, Safety Education & Training, Self-Care / ADL  G-Code     OutComes Score                                                  AM-PAC Score        AM-[facility] Inpatient Daily Activity Raw Score: 15 (03/03/21 1247)  AM-PAC Inpatient ADL T-Scale Score : 34.69 (03/03/21 1247)  ADL Inpatient CMS 0-100% Score: 56.46 (03/03/21 1247)  ADL Inpatient CMS G-Code Modifier : CK (03/03/21 1247)    Goals  Short term goals  Time Frame for Short term goals: discharge  Short term goal 1: Pt will complete toilet hygiene at SBA level - not met  Short term goal 2: Pt will complete toilet transfer at SBA level - not met  Short term goal 3: Pt will participate in functional standing task, >2 min at SUP level - not met  Short term goal 4: Pt will participate in LE dressing assessment - pt requires Max A.  New goal 3/3: Pt will complete LE dressing at Artemio Gentle A level w/ use of AE  Patient Goals   Patient goals : none stated Therapy Time   Individual Concurrent Group Co-treatment   Time In 3082         Time Out 1233         Minutes Rebekah Norris, s/OT  Cher Godoy OTR/L #[id number]  Therapist was present, directed the patient's care, made skilled judgement, and was responsible for assessment and treatment of the patient.

## 2021-03-03 NOTE — PROGRESS NOTES
Surgery Daily Progress Note      CC: Colon adenocarcinoma    SUBJECTIVE:  No acute events overnight. Remains hypertensive. ROS:   A 14 point review of systems was conducted, significant findings as noted above. All other systems negative. OBJECTIVE:    PHYSICAL EXAM:  Vitals:    03/02/21 1933 03/02/21 1957 03/02/21 2318 03/03/21 0355   BP: (!) 183/93 (!) 179/80 (!) 168/89 (!) 178/91   Pulse:  92 84 88   Resp:  18 18 18   Temp:  99.1 °F (37.3 °C) 98 °F (36.7 °C) 98.5 °F (36.9 °C)   TempSrc:  Oral Oral Oral   SpO2:  93% 95% 93%   Weight:       Height:           General appearance: Alert, no acute distress, well-developed, well-nourished  HEENT: Normocephalic, extraocular movements grossly intact, moist mucous membranes  Chest/Lungs: normal inspiratory effort, symmetric chest rise, no accessory muscle use  Cardiovascular: Regular rate and rhythm, no murmurs  Abdomen: Soft, obese, appropriately tender to palpation throughout, incisions clean/dry/intact  Neuro: A&Ox3, no gross motor or sensory neuro deficits  Extremities: no edema, no cyanosis      ASSESSMENT & PLAN:   Tang Quintana is a 65 y.o. 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    - Continue regular diet  - Continue home medications  - Continue PO pain medication  - Encouraged OOB/ambulation  - PT/OT with 17 and 17 /24 respectively; okay for home discharge. - Possible discharge later today if dizziness improves.     Sasha Mcguire MD, PGY-1  03/03/21  5:41 AM  [phone]

## 2021-03-04 ENCOUNTER — APPOINTMENT (OUTPATIENT)
Dept: CT IMAGING | Age: 71
DRG: 330 | End: 2021-03-04
Attending: SURGERY
Payer: MEDICARE

## 2021-03-04 LAB
ALBUMIN SERPL-MCNC: 3 G/DL (ref 3.4–5)
ANION GAP SERPL CALCULATED.3IONS-SCNC: 12 MMOL/L (ref 3–16)
BASOPHILS ABSOLUTE: 0 K/UL (ref 0–0.2)
BASOPHILS RELATIVE PERCENT: 0 %
BUN BLDV-MCNC: 10 MG/DL (ref 7–20)
CALCIUM SERPL-MCNC: 8.3 MG/DL (ref 8.3–10.6)
CHLORIDE BLD-SCNC: 100 MMOL/L (ref 99–110)
CO2: 25 MMOL/L (ref 21–32)
CREAT SERPL-MCNC: 0.6 MG/DL (ref 0.6–1.2)
EOSINOPHILS ABSOLUTE: 0.4 K/UL (ref 0–0.6)
EOSINOPHILS RELATIVE PERCENT: 3 %
GFR AFRICAN AMERICAN: >60
GFR NON-AFRICAN AMERICAN: >60
GLUCOSE BLD-MCNC: 157 MG/DL (ref 70–99)
GLUCOSE BLD-MCNC: 158 MG/DL (ref 70–99)
GLUCOSE BLD-MCNC: 163 MG/DL (ref 70–99)
GLUCOSE BLD-MCNC: 167 MG/DL (ref 70–99)
GLUCOSE BLD-MCNC: 192 MG/DL (ref 70–99)
HCT VFR BLD CALC: 26.5 % (ref 36–48)
HEMOGLOBIN: 8.8 G/DL (ref 12–16)
LYMPHOCYTES ABSOLUTE: 1.5 K/UL (ref 1–5.1)
LYMPHOCYTES RELATIVE PERCENT: 11 %
MAGNESIUM: 1.8 MG/DL (ref 1.8–2.4)
MCH RBC QN AUTO: 29.5 PG (ref 26–34)
MCHC RBC AUTO-ENTMCNC: 33.1 G/DL (ref 31–36)
MCV RBC AUTO: 89.1 FL (ref 80–100)
MONOCYTES ABSOLUTE: 0.9 K/UL (ref 0–1.3)
MONOCYTES RELATIVE PERCENT: 7 %
NEUTROPHILS ABSOLUTE: 10.5 K/UL (ref 1.7–7.7)
NEUTROPHILS RELATIVE PERCENT: 79 %
PDW BLD-RTO: 15.9 % (ref 12.4–15.4)
PERFORMED ON: ABNORMAL
PHOSPHORUS: 3.6 MG/DL (ref 2.5–4.9)
PLATELET # BLD: 384 K/UL (ref 135–450)
PMV BLD AUTO: 7.5 FL (ref 5–10.5)
POTASSIUM SERPL-SCNC: 3.7 MMOL/L (ref 3.5–5.1)
PRO-BNP: 180 PG/ML (ref 0–124)
RBC # BLD: 2.98 M/UL (ref 4–5.2)
SODIUM BLD-SCNC: 137 MMOL/L (ref 136–145)
WBC # BLD: 13.3 K/UL (ref 4–11)

## 2021-03-04 PROCEDURE — 2580000003 HC RX 258: Performed by: STUDENT IN AN ORGANIZED HEALTH CARE EDUCATION/TRAINING PROGRAM

## 2021-03-04 PROCEDURE — 94669 MECHANICAL CHEST WALL OSCILL: CPT

## 2021-03-04 PROCEDURE — 2500000003 HC RX 250 WO HCPCS: Performed by: STUDENT IN AN ORGANIZED HEALTH CARE EDUCATION/TRAINING PROGRAM

## 2021-03-04 PROCEDURE — 6360000002 HC RX W HCPCS: Performed by: STUDENT IN AN ORGANIZED HEALTH CARE EDUCATION/TRAINING PROGRAM

## 2021-03-04 PROCEDURE — 94640 AIRWAY INHALATION TREATMENT: CPT

## 2021-03-04 PROCEDURE — 6360000004 HC RX CONTRAST MEDICATION: Performed by: INTERNAL MEDICINE

## 2021-03-04 PROCEDURE — 6360000002 HC RX W HCPCS: Performed by: SURGERY

## 2021-03-04 PROCEDURE — 97535 SELF CARE MNGMENT TRAINING: CPT

## 2021-03-04 PROCEDURE — 6370000000 HC RX 637 (ALT 250 FOR IP): Performed by: STUDENT IN AN ORGANIZED HEALTH CARE EDUCATION/TRAINING PROGRAM

## 2021-03-04 PROCEDURE — 71275 CT ANGIOGRAPHY CHEST: CPT

## 2021-03-04 PROCEDURE — 94761 N-INVAS EAR/PLS OXIMETRY MLT: CPT

## 2021-03-04 PROCEDURE — 99024 POSTOP FOLLOW-UP VISIT: CPT | Performed by: SURGERY

## 2021-03-04 PROCEDURE — 6370000000 HC RX 637 (ALT 250 FOR IP): Performed by: INTERNAL MEDICINE

## 2021-03-04 PROCEDURE — 2700000000 HC OXYGEN THERAPY PER DAY

## 2021-03-04 PROCEDURE — 83735 ASSAY OF MAGNESIUM: CPT

## 2021-03-04 PROCEDURE — 6370000000 HC RX 637 (ALT 250 FOR IP): Performed by: SURGERY

## 2021-03-04 PROCEDURE — 80069 RENAL FUNCTION PANEL: CPT

## 2021-03-04 PROCEDURE — 1200000000 HC SEMI PRIVATE

## 2021-03-04 PROCEDURE — 97530 THERAPEUTIC ACTIVITIES: CPT

## 2021-03-04 PROCEDURE — 83880 ASSAY OF NATRIURETIC PEPTIDE: CPT

## 2021-03-04 PROCEDURE — 36415 COLL VENOUS BLD VENIPUNCTURE: CPT

## 2021-03-04 PROCEDURE — 6360000002 HC RX W HCPCS: Performed by: INTERNAL MEDICINE

## 2021-03-04 PROCEDURE — 85025 COMPLETE CBC W/AUTO DIFF WBC: CPT

## 2021-03-04 RX ORDER — ARFORMOTEROL TARTRATE 15 UG/2ML
15 SOLUTION RESPIRATORY (INHALATION) 2 TIMES DAILY
Status: DISCONTINUED | OUTPATIENT
Start: 2021-03-04 | End: 2021-03-05 | Stop reason: HOSPADM

## 2021-03-04 RX ORDER — BUDESONIDE AND FORMOTEROL FUMARATE DIHYDRATE 160; 4.5 UG/1; UG/1
2 AEROSOL RESPIRATORY (INHALATION) 2 TIMES DAILY
Status: DISCONTINUED | OUTPATIENT
Start: 2021-03-04 | End: 2021-03-04

## 2021-03-04 RX ORDER — MECLIZINE HYDROCHLORIDE 25 MG/1
25 TABLET ORAL 3 TIMES DAILY PRN
Qty: 20 TABLET | Refills: 0 | Status: SHIPPED | OUTPATIENT
Start: 2021-03-04 | End: 2021-03-14

## 2021-03-04 RX ORDER — BUDESONIDE 0.5 MG/2ML
0.5 INHALANT ORAL 2 TIMES DAILY
Status: DISCONTINUED | OUTPATIENT
Start: 2021-03-04 | End: 2021-03-05 | Stop reason: HOSPADM

## 2021-03-04 RX ORDER — IPRATROPIUM BROMIDE AND ALBUTEROL SULFATE 2.5; .5 MG/3ML; MG/3ML
1 SOLUTION RESPIRATORY (INHALATION)
Status: DISCONTINUED | OUTPATIENT
Start: 2021-03-04 | End: 2021-03-05 | Stop reason: HOSPADM

## 2021-03-04 RX ORDER — MAGNESIUM SULFATE IN WATER 40 MG/ML
2000 INJECTION, SOLUTION INTRAVENOUS ONCE
Status: COMPLETED | OUTPATIENT
Start: 2021-03-04 | End: 2021-03-04

## 2021-03-04 RX ADMIN — ALBUTEROL SULFATE 2.5 MG: 2.5 SOLUTION RESPIRATORY (INHALATION) at 07:38

## 2021-03-04 RX ADMIN — AMLODIPINE BESYLATE 10 MG: 10 TABLET ORAL at 09:28

## 2021-03-04 RX ADMIN — IPRATROPIUM BROMIDE AND ALBUTEROL SULFATE 1 AMPULE: .5; 3 SOLUTION RESPIRATORY (INHALATION) at 15:49

## 2021-03-04 RX ADMIN — HYDRALAZINE HYDROCHLORIDE 10 MG: 20 INJECTION INTRAMUSCULAR; INTRAVENOUS at 19:21

## 2021-03-04 RX ADMIN — HYDRALAZINE HYDROCHLORIDE 10 MG: 20 INJECTION INTRAMUSCULAR; INTRAVENOUS at 09:34

## 2021-03-04 RX ADMIN — DORZOLAMIDE HYDROCHLORIDE 1 DROP: 20 SOLUTION/ DROPS OPHTHALMIC at 14:12

## 2021-03-04 RX ADMIN — Medication 10 ML: at 09:15

## 2021-03-04 RX ADMIN — INSULIN LISPRO 3 UNITS: 100 INJECTION, SOLUTION INTRAVENOUS; SUBCUTANEOUS at 09:45

## 2021-03-04 RX ADMIN — MAGNESIUM SULFATE IN WATER 2000 MG: 40 INJECTION, SOLUTION INTRAVENOUS at 10:32

## 2021-03-04 RX ADMIN — IOPAMIDOL 80 ML: 755 INJECTION, SOLUTION INTRAVENOUS at 12:16

## 2021-03-04 RX ADMIN — DORZOLAMIDE HYDROCHLORIDE 1 DROP: 20 SOLUTION/ DROPS OPHTHALMIC at 20:28

## 2021-03-04 RX ADMIN — IPRATROPIUM BROMIDE AND ALBUTEROL SULFATE 1 AMPULE: .5; 3 SOLUTION RESPIRATORY (INHALATION) at 23:17

## 2021-03-04 RX ADMIN — ACETAMINOPHEN 1000 MG: 500 TABLET, COATED ORAL at 14:11

## 2021-03-04 RX ADMIN — BUDESONIDE 500 MCG: 0.5 SUSPENSION RESPIRATORY (INHALATION) at 23:18

## 2021-03-04 RX ADMIN — ACETAMINOPHEN 1000 MG: 500 TABLET, COATED ORAL at 18:30

## 2021-03-04 RX ADMIN — DORZOLAMIDE HYDROCHLORIDE 1 DROP: 20 SOLUTION/ DROPS OPHTHALMIC at 09:15

## 2021-03-04 RX ADMIN — Medication 10 ML: at 20:43

## 2021-03-04 RX ADMIN — IPRATROPIUM BROMIDE AND ALBUTEROL SULFATE 1 AMPULE: .5; 3 SOLUTION RESPIRATORY (INHALATION) at 11:38

## 2021-03-04 RX ADMIN — INSULIN LISPRO 2 UNITS: 100 INJECTION, SOLUTION INTRAVENOUS; SUBCUTANEOUS at 20:38

## 2021-03-04 RX ADMIN — POTASSIUM PHOSPHATE, MONOBASIC AND POTASSIUM PHOSPHATE, DIBASIC 15 MMOL: 224; 236 INJECTION, SOLUTION, CONCENTRATE INTRAVENOUS at 09:21

## 2021-03-04 RX ADMIN — ARFORMOTEROL TARTRATE 15 MCG: 15 SOLUTION RESPIRATORY (INHALATION) at 23:20

## 2021-03-04 RX ADMIN — METOPROLOL TARTRATE 25 MG: 25 TABLET, FILM COATED ORAL at 09:29

## 2021-03-04 RX ADMIN — METOPROLOL TARTRATE 25 MG: 25 TABLET, FILM COATED ORAL at 20:42

## 2021-03-04 RX ADMIN — BRIMONIDINE TARTRATE 1 DROP: 2 SOLUTION OPHTHALMIC at 09:14

## 2021-03-04 RX ADMIN — ARFORMOTEROL TARTRATE 15 MCG: 15 SOLUTION RESPIRATORY (INHALATION) at 11:38

## 2021-03-04 RX ADMIN — ENOXAPARIN SODIUM 40 MG: 40 INJECTION SUBCUTANEOUS at 09:13

## 2021-03-04 RX ADMIN — BRIMONIDINE TARTRATE 1 DROP: 2 SOLUTION OPHTHALMIC at 20:28

## 2021-03-04 RX ADMIN — LATANOPROST 1 DROP: 50 SOLUTION OPHTHALMIC at 20:28

## 2021-03-04 RX ADMIN — BUDESONIDE 500 MCG: 0.5 SUSPENSION RESPIRATORY (INHALATION) at 11:38

## 2021-03-04 RX ADMIN — PRAVASTATIN SODIUM 20 MG: 20 TABLET ORAL at 09:13

## 2021-03-04 RX ADMIN — INSULIN LISPRO 3 UNITS: 100 INJECTION, SOLUTION INTRAVENOUS; SUBCUTANEOUS at 18:30

## 2021-03-04 RX ADMIN — INSULIN LISPRO 3 UNITS: 100 INJECTION, SOLUTION INTRAVENOUS; SUBCUTANEOUS at 14:12

## 2021-03-04 RX ADMIN — ACETAMINOPHEN 1000 MG: 500 TABLET, COATED ORAL at 06:37

## 2021-03-04 RX ADMIN — MONTELUKAST 10 MG: 10 TABLET, FILM COATED ORAL at 09:14

## 2021-03-04 RX ADMIN — SERTRALINE HYDROCHLORIDE 50 MG: 50 TABLET ORAL at 09:13

## 2021-03-04 ASSESSMENT — PAIN SCALES - GENERAL
PAINLEVEL_OUTOF10: 1
PAINLEVEL_OUTOF10: 0
PAINLEVEL_OUTOF10: 0

## 2021-03-04 ASSESSMENT — PAIN DESCRIPTION - PAIN TYPE: TYPE: ACUTE PAIN

## 2021-03-04 ASSESSMENT — PAIN SCALES - WONG BAKER: WONGBAKER_NUMERICALRESPONSE: 0

## 2021-03-04 NOTE — CARE COORDINATION
CM following, spoke with pt again about option for SNF vs HHC at DC, pt refused SNF plans to DC home [facility] once cleared medically. Pt getting CTA today, cont to be on O2 today, will need to wean or get Home O2 eval for new Home O2 orders.   Electronically signed by Bradley Ferreira RN on 3/4/2021 at 11:10 AM  [phone]

## 2021-03-04 NOTE — PROGRESS NOTES
Surgery Daily Progress Note      CC: Colon adenocarcinoma    SUBJECTIVE:  No acute events overnight. Continues to report occasional dizziness. Having BMs; tolerating diet. Pain controlled. Remains hypertensive. ROS:   A 14 point review of systems was conducted, significant findings as noted above. All other systems negative. OBJECTIVE:    PHYSICAL EXAM:  Vitals:    03/03/21 2039 03/03/21 2252 03/04/21 0212 03/04/21 0240   BP:  (!) 163/77 (!) 172/83 (!) 166/84   Pulse:  96 94    Resp:  19 18    Temp:  98.7 °F (37.1 °C) 98.2 °F (36.8 °C)    TempSrc:  Oral Oral    SpO2: 92% 95% 93%    Weight:       Height:           General appearance: Alert, no acute distress, well-developed, well-nourished  HEENT: Normocephalic, extraocular movements grossly intact, moist mucous membranes  Chest/Lungs: normal inspiratory effort, symmetric chest rise, no accessory muscle use  Cardiovascular: Regular rate and rhythm, no murmurs  Abdomen: Soft, obese, appropriately tender to palpation throughout, incisions clean/dry/intact  Neuro: A&Ox3, no gross motor or sensory neuro deficits  Extremities: no edema, no cyanosis      ASSESSMENT & PLAN:   Brittanie Voss is a 65 y.o. female with history of DM who presented to [facility] on 2/26 for elective surgery for her descending colon cancer, undergoing hand/robotic-assisted laparoscopic left colon resection 2/26, POD #6.    - Continue regular diet  - Continue home medications  - Continue PO pain medication  - Encouraged OOB/ambulation  - IM consulted for dizziness, orthostatics negative. Will follow up recommendations today, including whether patient okay for discharge.  - PT/OT with 17 and 17 /24 respectively; okay for home discharge. - Possible discharge later today if dizziness remains stable.     Marko Cage MD, PGY-1  03/04/21  6:12 AM  [phone]

## 2021-03-04 NOTE — PROGRESS NOTES
Occupational Therapy  Facility/Department: [facility]  Daily Treatment Note  NAME: Jeane Ríos  : 1950  MRN: [id number]    Date of Service: 3/4/2021    Discharge Recommendations:  Jeane Ríos scored a 15/24 on the AM-PAC ADL Inpatient form. Current research shows that an AM-PAC score of 17 or less is typically not associated with a discharge to the patient's home setting. Based on the patient's AM-PAC score and their current ADL deficits, it is recommended that the patient have 3-5 sessions per week of Occupational Therapy at d/c to increase the patient's independence. Please see assessment section for further patient specific details. If Home,   HOME HEALTH CARE: LEVEL 3 SAFETY     - Initial home health evaluation to occur within 24-48 hours, in patient home   - Therapy evaluations in home within 24-48 hours of discharge; including DME and home safety   - Frontload therapy 5 days, then 3x a week   - Therapy to evaluate if patient has [address] needs for personal care   -  evaluation within 24-48 hours, includes evaluation of resources and insurance to determine [place], [place], LTC, and Medicaid options       If patient discharges prior to next session this note will serve as a discharge summary. Please see below for the latest assessment towards goals. OT Equipment Recommendations  Other: Pt would benefit from RTS w/ handles, as well as AE (toilet aide, reacher, etc.)    Assessment   Performance deficits / Impairments: Decreased functional mobility ; Decreased ADL status; Decreased endurance;Decreased balance;Decreased strength  Assessment: Continues to functional well below baseline. Making progress w/ functional transfers. Continues to be DIXON on min exertion - only tolerating ~1.5 minutes of standing at a time. Would benefit from IP OT, but pt adamant about return home. If home, recommend 24 hr hands on assist (emphasized this recommendation) and HHOT.  Continue per POC.  Treatment Diagnosis: Impaired ADLs, transfers, and decreased functional activity tolerance  Prognosis: Good  OT Education: OT Role;Plan of Care;ADL Adaptive Strategies;Transfer Training;Energy Conservation  Patient Education: pt verbalized understanding of all saying \"I know I hear you baby\" repeatedly; continue to reinforce  REQUIRES OT FOLLOW UP: Yes  Activity Tolerance  Activity Tolerance: Patient limited by fatigue;Treatment limited secondary to medical complications (free text)  Activity Tolerance: DIXON after ~1.5 min or less of physical activity; c/o dizziness intermittently; cues for PLB technique  Safety Devices  Safety Devices in place: Yes  Type of devices: Left in bed;Call light within reach; Bed alarm in place;Nurse notified         Patient Diagnosis(es): The primary encounter diagnosis was Post-op pain. A diagnosis of Cancer of descending colon [facility]) was also pertinent to this visit. has a past medical history of Allergic rhinitis, Asthma, Back pain, GI bleeding, Glaucoma, Hypertension, Knee pain, Morbid obesity due to excess calories ([address] Osteoporosis, and Type II or unspecified type diabetes mellitus without mention of complication, not stated as uncontrolled. has a past surgical history that includes Colonoscopy (9/27/2014); knee surgery (Left); Hysterectomy; Colonoscopy (1/30/2020); Colonoscopy (1/30/2020); and Small intestine surgery (Left, 2/26/2021). Restrictions  Position Activity Restriction  Other position/activity restrictions: up w/ A, ambulate pt; abdominal binder  Subjective   General  Chart Reviewed: Yes  Patient assessed for rehabilitation services?: Yes  Additional Pertinent Hx: Hx = asthma, HTN, DM  Response to previous treatment: Patient with no complaints from previous session  Family / Caregiver Present: No  Diagnosis: 2/26 scheduled OR Robotic-assisted laparoscopic left colectomy with colocolonic anastomosis.  Complications: Intraabdominal bleeding requiring one unit of packed redblood cells. 3/4 CT Abd/Pelvis: neg PE, bilateral effusions. Subjective  Subjective: In bed on entry. \"They want to get me out of here. \" \"I want to get back to bed before you walk to the door. Baby, I need my nap. \" Reports not sleeping well. General Comment  Comments: Pt reports dizziness has been a baseline issue; she believes it may be related to her \"ear\" (re: vestibular), and is interested in having this assessed  Vital Signs  Patient Currently in Pain: Denies   Orientation  Orientation  Overall Orientation Status: Within Functional Limits(oriented w/ conversation, not formally questioned)  Objective    ADL  Toileting: Maximum assistance(requiring assist for hygiene; using Purewick)        Balance  Sitting Balance: Stand by assistance  Standing Balance: Contact guard assistance  Standing Balance  Time: up to 1.5 min, x3 bouts  Activity: mobility in room, toileting  Comment: DIXON on exertion; on 2L O2 per NC - O2 sats 96% despite DIXON; needing max cues for energy conservation and pacing of activity  Functional Mobility  Functional - Mobility Device: Rolling Walker  Activity: To/from bathroom(mobility in room)  Assist Level: Contact guard assistance  Functional Mobility Comments: on 2L O2; increased cues for safety w/ increased DIXON (becomes impulsive w/ SOB)  Toilet Transfers  Toilet - Technique: Ambulating(using wh walker)  Equipment Used: Standard toilet(w/ bar)  Toilet Transfer: Contact guard assistance  Bed mobility  Supine to Sit: Minimal assistance(HOB elevated, bedrail)  Sit to Supine: Stand by assistance  Scooting: Stand by assistance(to EOB)  Transfers  Sit to stand: (CGA (from bed);  Mod A (from low chair))  Stand to sit: Contact guard assistance                       Cognition  Cognition Comment: decreased insight about functional capacity                                         Plan   Plan  Times per week: 2-5x  Times per day: Daily  Current Treatment Recommendations: Strengthening,

## 2021-03-04 NOTE — PROGRESS NOTES
Hospitalist Progress Note    Patient:  Jeane Ríos      Unit/Bed:5304/5304-01    YOB: 1950    MRN: [id number]       Acct: [de-identified]     PCP: Ming Mike MD    Date of Admission: 2/26/2021      Post op management. Hospital Course     64 y/o female p/w elective colectomy surgery. Subjective/interval history:     C/o sob  Wheezing.   Denies any cp          Medications       Infusion Medications    sodium chloride      sodium chloride      dextrose       Scheduled Medications    ipratropium-albuterol  1 ampule Inhalation Q4H WA    budesonide  0.5 mg Nebulization BID    And    Arformoterol Tartrate  15 mcg Nebulization BID    hydrALAZINE  10 mg Intravenous Q6H    latanoprost  1 drop Both Eyes Nightly    brimonidine  1 drop Both Eyes BID    dorzolamide  1 drop Both Eyes TID    amLODIPine  10 mg Oral Daily    montelukast  10 mg Oral Daily    pravastatin  20 mg Oral Daily    sertraline  50 mg Oral Daily    [Held by provider] metoclopramide  5 mg Intravenous Q6H    insulin lispro  0-18 Units Subcutaneous TID WC    insulin lispro  0-9 Units Subcutaneous Nightly    sodium chloride flush  10 mL Intravenous 2 times per day    acetaminophen  1,000 mg Oral Q6H    metoprolol tartrate  25 mg Oral BID    enoxaparin  40 mg Subcutaneous Daily     PRN Meds: calcium carbonate, meclizine, sodium chloride, melatonin, sodium chloride, albuterol, sodium chloride flush, ondansetron **OR** ondansetron, oxyCODONE, glucose, dextrose, glucagon (rDNA), dextrose, methocarbamol      Intake/Output Summary (Last 24 hours) at 3/4/2021 1441  Last data filed at 3/4/2021 0915  Gross per 24 hour   Intake 10 ml   Output 850 ml   Net -840 ml       Diet:  DIET GENERAL; Carb Control: 3 carb choices (45 gms)/meal      Physical Examination      Vitals:  /70   Pulse 82   Temp 97.2 °F (36.2 °C) (Oral)   Resp (!) 89   Ht 5' (1.524 m)   Wt 222 lb (100.7 kg)   SpO2 98%   BMI 43.36 Hours:  CTA PULMONARY W CONTRAST   Final Result      1. No evidence of pulmonary embolism to the segmental level. 2. Moderate bilateral pleural effusions are present. There is trace, depending consolidation lower lobe which is likely atelectasis. XR CHEST PORTABLE   Final Result   1. Patchy bilateral lower lung reticular and airspace opacities suggesting bilateral pneumonia versus lower lung atelectasis. Microbiology:      Urinalysis:      Lab Results   Component Value Date    NITRU Negative 03/30/2020    WBCUA 3 03/30/2020    BACTERIA 1+ 03/30/2020    RBCUA 1 03/30/2020    BLOODU TRACE-INTACT 03/30/2020    SPECGRAV 1.020 03/30/2020    GLUCOSEU Negative 03/30/2020                 Assessment      Active Problems:    Cancer of descending colon ([address]    Cancer of left colon (HCC)  Resolved Problems:    * No resolved hospital problems. *       Plan:          Plan  Dizziness  Check orthostatics , negative. No concerning cardiac complaints  No focal neurological deficit, ongoing for 6 months  Suspect secondary to deconditioning , anemia associated with the surgery       SOB   Added Nebs has hx of asthma. Wheezing on exam   Added ICS + LABA  CTPA ruled out PE. Essential hypertension  Continue hydralazine  Continue amlodipine  Check orthostatics        Diabetes mellitus type 2  At home on metformin  Continue with sliding scale insulin. Blood sugar this morning is 187  Goal perioperative blood sugars below 180         Comorbidities    ·  Colon CA. CODE STATUS:Full Code         DVT prophylaxis: [x] Lovenox                                 [] SCDs                                 [] SQ Heparin                                 [x] Encourage ambulation           [] Already on Anticoagulation     Code Status: Full Code    Tele:   [x] yes             [] no    ------------------  Disposition:  ------------------    Barrier for discharge:  ·  Pending improvement.        Disposition: [x] Home       [] TCU       [] Rehab       [] Psych       [] SNF       [] [place]       [] Other-    Electronically signed by Jose Guadalupe Simon MD on 3/4/2021 at 2:41 PM

## 2021-03-05 VITALS
DIASTOLIC BLOOD PRESSURE: 72 MMHG | OXYGEN SATURATION: 97 % | WEIGHT: 222 LBS | RESPIRATION RATE: 18 BRPM | TEMPERATURE: 98.2 F | SYSTOLIC BLOOD PRESSURE: 167 MMHG | HEART RATE: 93 BPM | BODY MASS INDEX: 43.59 KG/M2 | HEIGHT: 60 IN

## 2021-03-05 LAB
ALBUMIN SERPL-MCNC: 3 G/DL (ref 3.4–5)
ANION GAP SERPL CALCULATED.3IONS-SCNC: 11 MMOL/L (ref 3–16)
BASOPHILS ABSOLUTE: 0.1 K/UL (ref 0–0.2)
BASOPHILS RELATIVE PERCENT: 1 %
BUN BLDV-MCNC: 9 MG/DL (ref 7–20)
CALCIUM SERPL-MCNC: 8.2 MG/DL (ref 8.3–10.6)
CHLORIDE BLD-SCNC: 99 MMOL/L (ref 99–110)
CO2: 25 MMOL/L (ref 21–32)
CREAT SERPL-MCNC: 0.6 MG/DL (ref 0.6–1.2)
EOSINOPHILS ABSOLUTE: 0.6 K/UL (ref 0–0.6)
EOSINOPHILS RELATIVE PERCENT: 5 %
GFR AFRICAN AMERICAN: >60
GFR NON-AFRICAN AMERICAN: >60
GLUCOSE BLD-MCNC: 129 MG/DL (ref 70–99)
GLUCOSE BLD-MCNC: 138 MG/DL (ref 70–99)
GLUCOSE BLD-MCNC: 142 MG/DL (ref 70–99)
HCT VFR BLD CALC: 25.4 % (ref 36–48)
HEMOGLOBIN: 8.4 G/DL (ref 12–16)
LYMPHOCYTES ABSOLUTE: 1.8 K/UL (ref 1–5.1)
LYMPHOCYTES RELATIVE PERCENT: 14 %
MAGNESIUM: 2.1 MG/DL (ref 1.8–2.4)
MCH RBC QN AUTO: 29.1 PG (ref 26–34)
MCHC RBC AUTO-ENTMCNC: 32.9 G/DL (ref 31–36)
MCV RBC AUTO: 88.5 FL (ref 80–100)
MONOCYTES ABSOLUTE: 0.8 K/UL (ref 0–1.3)
MONOCYTES RELATIVE PERCENT: 6 %
NEUTROPHILS ABSOLUTE: 9.5 K/UL (ref 1.7–7.7)
NEUTROPHILS RELATIVE PERCENT: 74 %
PDW BLD-RTO: 16.3 % (ref 12.4–15.4)
PERFORMED ON: ABNORMAL
PERFORMED ON: ABNORMAL
PHOSPHORUS: 3.2 MG/DL (ref 2.5–4.9)
PLATELET # BLD: 470 K/UL (ref 135–450)
PMV BLD AUTO: 7 FL (ref 5–10.5)
POLYCHROMASIA: ABNORMAL
POTASSIUM SERPL-SCNC: 3.6 MMOL/L (ref 3.5–5.1)
RBC # BLD: 2.87 M/UL (ref 4–5.2)
SODIUM BLD-SCNC: 135 MMOL/L (ref 136–145)
WBC # BLD: 12.8 K/UL (ref 4–11)

## 2021-03-05 PROCEDURE — 80069 RENAL FUNCTION PANEL: CPT

## 2021-03-05 PROCEDURE — 6360000002 HC RX W HCPCS: Performed by: SURGERY

## 2021-03-05 PROCEDURE — 2700000000 HC OXYGEN THERAPY PER DAY

## 2021-03-05 PROCEDURE — 99024 POSTOP FOLLOW-UP VISIT: CPT | Performed by: SURGERY

## 2021-03-05 PROCEDURE — 36415 COLL VENOUS BLD VENIPUNCTURE: CPT

## 2021-03-05 PROCEDURE — 85025 COMPLETE CBC W/AUTO DIFF WBC: CPT

## 2021-03-05 PROCEDURE — 94640 AIRWAY INHALATION TREATMENT: CPT

## 2021-03-05 PROCEDURE — 6370000000 HC RX 637 (ALT 250 FOR IP): Performed by: STUDENT IN AN ORGANIZED HEALTH CARE EDUCATION/TRAINING PROGRAM

## 2021-03-05 PROCEDURE — 94761 N-INVAS EAR/PLS OXIMETRY MLT: CPT

## 2021-03-05 PROCEDURE — 6370000000 HC RX 637 (ALT 250 FOR IP): Performed by: INTERNAL MEDICINE

## 2021-03-05 PROCEDURE — 2580000003 HC RX 258: Performed by: STUDENT IN AN ORGANIZED HEALTH CARE EDUCATION/TRAINING PROGRAM

## 2021-03-05 PROCEDURE — 83735 ASSAY OF MAGNESIUM: CPT

## 2021-03-05 PROCEDURE — 6370000000 HC RX 637 (ALT 250 FOR IP): Performed by: SURGERY

## 2021-03-05 PROCEDURE — 94618 PULMONARY STRESS TESTING: CPT

## 2021-03-05 PROCEDURE — 94669 MECHANICAL CHEST WALL OSCILL: CPT

## 2021-03-05 PROCEDURE — 6360000002 HC RX W HCPCS: Performed by: INTERNAL MEDICINE

## 2021-03-05 RX ORDER — POTASSIUM CHLORIDE 20 MEQ/1
40 TABLET, EXTENDED RELEASE ORAL ONCE
Status: COMPLETED | OUTPATIENT
Start: 2021-03-05 | End: 2021-03-05

## 2021-03-05 RX ADMIN — AMLODIPINE BESYLATE 10 MG: 10 TABLET ORAL at 08:53

## 2021-03-05 RX ADMIN — ANTACID TABLETS 500 MG: 500 TABLET, CHEWABLE ORAL at 03:18

## 2021-03-05 RX ADMIN — ACETAMINOPHEN 1000 MG: 500 TABLET, COATED ORAL at 03:18

## 2021-03-05 RX ADMIN — DORZOLAMIDE HYDROCHLORIDE 1 DROP: 20 SOLUTION/ DROPS OPHTHALMIC at 13:49

## 2021-03-05 RX ADMIN — ENOXAPARIN SODIUM 40 MG: 40 INJECTION SUBCUTANEOUS at 08:51

## 2021-03-05 RX ADMIN — PRAVASTATIN SODIUM 20 MG: 20 TABLET ORAL at 08:53

## 2021-03-05 RX ADMIN — BUDESONIDE 500 MCG: 0.5 SUSPENSION RESPIRATORY (INHALATION) at 07:53

## 2021-03-05 RX ADMIN — SERTRALINE HYDROCHLORIDE 50 MG: 50 TABLET ORAL at 08:53

## 2021-03-05 RX ADMIN — IPRATROPIUM BROMIDE AND ALBUTEROL SULFATE 1 AMPULE: .5; 3 SOLUTION RESPIRATORY (INHALATION) at 10:54

## 2021-03-05 RX ADMIN — Medication 10 ML: at 08:54

## 2021-03-05 RX ADMIN — INSULIN LISPRO 3 UNITS: 100 INJECTION, SOLUTION INTRAVENOUS; SUBCUTANEOUS at 08:49

## 2021-03-05 RX ADMIN — DORZOLAMIDE HYDROCHLORIDE 1 DROP: 20 SOLUTION/ DROPS OPHTHALMIC at 08:49

## 2021-03-05 RX ADMIN — ACETAMINOPHEN 1000 MG: 500 TABLET, COATED ORAL at 08:53

## 2021-03-05 RX ADMIN — POTASSIUM CHLORIDE 40 MEQ: 1500 TABLET, EXTENDED RELEASE ORAL at 08:53

## 2021-03-05 RX ADMIN — BRIMONIDINE TARTRATE 1 DROP: 2 SOLUTION OPHTHALMIC at 08:51

## 2021-03-05 RX ADMIN — METOPROLOL TARTRATE 25 MG: 25 TABLET, FILM COATED ORAL at 08:53

## 2021-03-05 RX ADMIN — ACETAMINOPHEN 1000 MG: 500 TABLET, COATED ORAL at 11:22

## 2021-03-05 RX ADMIN — ARFORMOTEROL TARTRATE 15 MCG: 15 SOLUTION RESPIRATORY (INHALATION) at 07:53

## 2021-03-05 RX ADMIN — IPRATROPIUM BROMIDE AND ALBUTEROL SULFATE 1 AMPULE: .5; 3 SOLUTION RESPIRATORY (INHALATION) at 07:53

## 2021-03-05 RX ADMIN — MONTELUKAST 10 MG: 10 TABLET, FILM COATED ORAL at 08:53

## 2021-03-05 ASSESSMENT — PAIN DESCRIPTION - PROGRESSION
CLINICAL_PROGRESSION: NOT CHANGED
CLINICAL_PROGRESSION: NOT CHANGED

## 2021-03-05 ASSESSMENT — PAIN DESCRIPTION - LOCATION: LOCATION: HEAD

## 2021-03-05 ASSESSMENT — PAIN SCALES - GENERAL
PAINLEVEL_OUTOF10: 0
PAINLEVEL_OUTOF10: 6

## 2021-03-05 ASSESSMENT — PAIN DESCRIPTION - PAIN TYPE
TYPE: ACUTE PAIN
TYPE: CHRONIC PAIN

## 2021-03-05 ASSESSMENT — PAIN DESCRIPTION - ONSET
ONSET: ON-GOING
ONSET: GRADUAL

## 2021-03-05 ASSESSMENT — PAIN - FUNCTIONAL ASSESSMENT: PAIN_FUNCTIONAL_ASSESSMENT: ACTIVITIES ARE NOT PREVENTED

## 2021-03-05 ASSESSMENT — PAIN DESCRIPTION - ORIENTATION: ORIENTATION: RIGHT;LEFT

## 2021-03-05 ASSESSMENT — PAIN DESCRIPTION - DESCRIPTORS: DESCRIPTORS: HEADACHE

## 2021-03-05 NOTE — PROGRESS NOTES
Pt is alert and oriented. bp still running slightly high. Given scheduled meds. Pt c/o headache this am. Pt has puriwick in and on speaciality bed. No new skin issues noted. Pt was at 93% on room air. Has d/c orders in. Will continue to monitor.

## 2021-03-05 NOTE — CARE COORDINATION
Case Management Assessment            Discharge Note                    Date / Time of Note: 3/5/2021 11:01 AM                  Discharge Note Completed by: Joss Oneal    Patient Name: Elisha Dejesus   YOB: 1950  Diagnosis: Cancer of descending colon [facility]) [C18.6]  Cancer of left colon [facility]) [C18.6]   Date / Time: 2/26/2021  8:52 AM    Current PCP: Johnna Guardado MD  Clinic patient: No    Hospitalization in the last 30 days: No    Advance Directives:  Code Status: Full Code  [place] DNR form completed and on chart: No    Financial:  Payor: Norris Rodriguez / Plan: [place] / Product Type: *No Product type* /      Pharmacy:    [facility]/pharmacy [address] [address]  Phone: [phone] Fax: [phone] [facility], [address] [place] [address]  Phone: [phone] Fax: [phone]      Assistance purchasing medications?: Potential Assistance Purchasing Medications: No  Assistance provided by Case Management: None at this time    Does patient want to participate in local refill/ meds to beds program?: No    Meds To Beds General Rules:  1. Can ONLY be done Monday- Friday between 8:30am-5pm  2. Prescription(s) must be in pharmacy by 3pm to be filled same day  3. Copy of patient's insurance/ prescription drug card and patient face sheet must be sent along with the prescription(s)  4. Cost of Rx cannot be added to hospital bill. If financial assistance is needed, please contact unit  or ;  or  CANNOT provide pharmacy voucher for patients co-pays  5.  Patients can then  the prescription on their way out of the hospital at discharge, or pharmacy can deliver to the bedside if staff is available. (payment due at time of pick-up or delivery - cash, check, or card accepted)     Able to afford home medications/ co-pay costs: Yes    ADLS:  Current PT AM-PAC Score: 17 /24  Current OT AM-PAC Score: 15 /24      DISCHARGE Disposition: Home with [address]: [facility] care [facility]     LOC at discharge: Not Applicable  EDEN Completed: Not Indicated    Notification completed in HENS/PAS?:  Not Applicable    IMM Completed:   Yes, Case management has presented and reviewed IMM letter #2 to the patient and/or family/ POA. Patient and/or family/POA verbalized understanding of their medicare rights and appeal process if needed. Patient and/or family/POA has signed, initialed and placed today's date (3/5/21) and time (1100) on IMM letter #2 on the the appropriate lines. Patient and/or family/POA, copy of letter offered and they are aware that this original copy of IMM letter #2 is available prior to discharge from the paper chart on the unit. Electronic documentation has been entered into epic for IMM letter #2 and original paper copy has been added to the paper chart at the nurses station. Transportation:  Transportation PLAN for discharge: family   Mode of Transport: [employer] ordered at discharge: Yes  [address]: Lamin Cobos  Phone: [phone]  Fax: [phone]  Orders faxed: Bita stallings will follow.        Durable Medical Equipment:  DME Provider: [facility]  Equipment obtained during hospitalization: O2    Home Oxygen and Respiratory Equipment:  Oxygen needed at discharge?: Yes  [address]: [facility]  Phone: [phone]   Portable tank available for discharge?: Yes    Dialysis:  Dialysis patient: No    Dialysis Center:  Not Applicable      Additional CM Notes: Pt will DC home with Family support, will get [facility] care [facility] for [address] aware and they are following, [facility] following for new home O2 needs, <elinda will deliver tank to room    The Plan for Transition of Care is related to the following treatment goals of Cancer of descending colon ([facility] [C18.6]  Cancer of left colon ([facility] [C18.6]    The Patient and/or patient representative Kianna and her family were provided with a choice of provider and agrees with the discharge plan Yes    Freedom of choice list was provided with basic dialogue that supports the patient's individualized plan of care/goals and shares the quality data associated with the providers.  Yes    Care Transitions patient: Yes    Surekha Lizama RN  [facility]  Case Management Department  Ph: [phone]  Fax: [phone]

## 2021-03-05 NOTE — PLAN OF CARE
Problem: Falls - Risk of:  Goal: Will remain free from falls  Description: Will remain free from falls  Outcome: Ongoing  Note: SBA w/ gait belt+walker. Non skid socks on. Calls appropriately. Fall risk dt SOB and dizziness. Call light in reach     Problem: Pain:  Goal: Pain level will decrease  Description: Pain level will decrease  Outcome: Ongoing  Note: C/o headache and gas pain.  Controlled w/ Tylenol

## 2021-03-05 NOTE — PLAN OF CARE
Problem: Falls - Risk of:  Goal: Will remain free from falls  Description: Will remain free from falls  Outcome: Ongoing  Note: Pt bed in low position and alarm on. Non skid socks on. Belongings, bedside table, and call light within reach. 2/4 side rails up. Will continue to monitor. Problem: Pain:  Goal: Pain level will decrease  Description: Pain level will decrease  Outcome: Ongoing  Note: Pt encouraged to use call light to notify staff when pain rises beyond tolerable. Pt educated on pain scale and was using call light appropriately.

## 2021-03-05 NOTE — PROGRESS NOTES
Snacking throughout day. On 0.5L 02 dt desaturation w/ movement. Orthostatics negative again this AM. Having adequate UO. Purewick replaced. Had 1BM (small brown). 500 on IS, doing Acapella. Ambulated in room w/ therapy and this RN. Gets SOB/dizzy (not worsening-remains baseline for her). Napped today. Lungs wheezy. Sx sites on abdomen CDI-bowel tones present. States she is having gas pains. SCDs on and bed alarm activated. All needs met at this time.

## 2021-03-05 NOTE — DISCHARGE SUMMARY
Physician Discharge Summary     Patient ID:  Cierra Gaytan  [id number]  9 y.o.  1950    Admit date: 2/26/2021    Discharge date and time: No discharge date for patient encounter. Admitting Physician: Iveth Jansen MD     Discharge Physician: Iveth Jansen MD     Admission Diagnoses: Cancer of descending colon [facility]) [C18.6]  Cancer of left colon [facility]) [C18.6]    Discharge Diagnoses: Cancer of descending colon ([facility] [C18.6]  Cancer of left colon ([facility] [C18.6]    PMH:  has a past medical history of Allergic rhinitis, Asthma, Back pain, GI bleeding, Glaucoma, Hypertension, Knee pain, Morbid obesity due to excess calories ([facility] Osteoporosis, and Type II or unspecified type diabetes mellitus without mention of complication, not stated as uncontrolled. PSH:  has a past surgical history that includes Colonoscopy (9/27/2014); knee surgery (Left); Hysterectomy; Colonoscopy (1/30/2020); Colonoscopy (1/30/2020); and Small intestine surgery (Left, 2/26/2021). Allergies: Pantoprazole sodium, Bactrim [sulfamethoxazole-trimethoprim], Enalapril, Lisinopril, Milk-related compounds, Pcn [penicillins], and Tape [adhesive tape]    Admission Condition: good    Discharged Condition: good    Indication for Admission: Cancer of descending colon ([facility] [C18.6]  Cancer of left colon [facility]) [C18.6]    Hospital Course: Patient presented to the hospital for a robotic assisted laparoscopic partial colectomy on 2/26. Post-operatively her pain was managed with PO pain medication without issue. Her diet was slowly advanced as tolerated. By discharge she was on a regular diet without nausea and vomiting. She has was having bowel movements and passing flatus. While inpatient she complained of dizziness which did not respond to [place]. On trending labs she was found to have a stable but low Hgb. She was transfused 1 unit of pRBC with appropriate response. After transfusion, she stated that her dizziness had improved slightly. Upon further questioning, she states that she has dizziness at baseline. She had trouble weaning her oxygen. Respiratory therapy evaluated and states that she qualifies for 1L NC. On discharge, patient was stable. Discharge Physical Exam:  General appearance: Alert, no acute distress, well-developed, well-nourished  HEENT: Normocephalic, extraocular movements grossly intact, moist mucous membranes  Chest/Lungs: normal inspiratory effort, symmetric chest rise, no accessory muscle use  Cardiovascular: Regular rate and rhythm, no murmurs  Abdomen: Soft, obese, appropriately tender to palpation throughout, incisions clean/dry/intact  Neuro: A&Ox3, no gross motor or sensory neuro deficits  Extremities: no edema, no cyanosis       Consults:     Significant Diagnostic Studies: Respiratory therapy    Treatments/Procedures: surgery: robotic assisted laparoscopic partial colectomy    Disposition: home    Patient Instructions:   See discharge instruction form.     Signed:  Guicho Mcnulty MD  3/5/2021  10:29 AM  [phone]

## 2021-03-05 NOTE — PROGRESS NOTES
[facility]    Respiratory Therapy     Home Oxygen Evaluation        Name: Carmela Alvarez Record Number: [id number]  Age: 65 y.o. Gender:  female   : 1950  Today's date: 3/5/2021  Room: [address] [place] [address]      Assessment        BP (!) 166/78   Pulse 91   Temp 97.1 °F (36.2 °C) (Oral)   Resp 18   Ht 5' (1.524 m)   Wt 222 lb (100.7 kg)   SpO2 100%   BMI 43.36 kg/m²     Patient Active Problem List   Diagnosis    DM type 2 (diabetes mellitus, type 2)    Sinus congestion    Hypertension    Tear of lateral cartilage or meniscus of knee, current    Rectal bleeding    Patellofemoral arthritis    Hyperlipidemia    CKD (chronic kidney disease) stage 3, GFR 30-59 ml/min (HCC)    Moderate persistent asthma without complication    Morbid obesity due to excess calories (HCC)    Asthma    Essential hypertension    Lateral meniscus tear    Cancer of descending colon (HCC)    Cancer of left colon ([facility]       Social History:  Social History     Tobacco Use    Smoking status: Former Smoker     Packs/day: 0.50     Years: 20.00     Pack years: 10.00     Types: Cigarettes     Start date: 1984     Quit date: 1993     Years since quittin.4    Smokeless tobacco: Never Used   Substance Use Topics    Alcohol use: No    Drug use: No       Patient Room Air saturation at rest 93 %  Patient Room Air saturation upon ambulation 87 %    Oxygen saturations of 88% or less on RA qualifies patient for Home Oxygen    Patient resting on 0  lmp  with an oxygen saturation of  93 %     Patient ambulated on 0 lpm with an oxygen saturation of 87%    Patient ambulated on 2 lpm with an oxygen saturation of 97%        Qualifying patient for home oxygen with ambulation and continuous flow  @ 2 lpm.      In your clinical assessment does the Patient Require Portable Oxygen Tanks?     Yes               Patient/caregiver was educated on Home Oxygen process:  Yes      Level of patient/caregiver understanding able to:   [] Verbalize understanding   [] Demonstrate understanding       [] Teach back        [] Needs reinforcement        []  No available caregiver               []  Other:     Response to education:  Very Good     Time Spent with Home O2 Set Up:  15  minutes     Jozef Rangel RCP on 3/5/2021 at 10:50 AM                 .

## 2021-03-05 NOTE — PROGRESS NOTES
Patient alert and oriented. Afebrile. VSS. Patient continues with shortness of breath, requiring O2 at 1 liter. Lungs sounds diminished, occasional wheezing. Encouraged IS, and flutter valve. Patient incontinent of bladder, pure wick in place. IV RAC, saline locked. Bed alarm on. Call light within reach.  Will continue to monitor

## 2021-03-05 NOTE — DISCHARGE SUMMARY
Physician Discharge Summary     Patient ID:  Bharat Armstrong  [id number]  78 y.o.  1950    Admit date: 2/26/2021    Discharge date and time: No discharge date for patient encounter. Admitting Physician: Gianna Dow MD     Discharge Physician: same    Admission Diagnoses: Cancer of descending colon [facility]) [C18.6]  Cancer of left colon [facility]) [C18.6]    Discharge Diagnoses: same    Admission Condition: stable    Discharged Condition: stable    Indication for Admission: Post op from robotic-assisted laparoscopic left colectomy with colocolonic anastomosis, mobilization of splenic flexure    Hospital Course: Pt admitted post op from procedure above. She was started on a CLD, retained the OR placed wood and was out of bed to chair working with IS on POD 1. She was monitored closely for an increase in her Lactic acid and decrease in her H&H post operatively. She denies nausea and emesis and was feeling well so the wood was removed POD2 and continued to encourage ambulation with the use of IS. She did have some occasional dizziness which prevented her from preforming some PT and OT. She was unable to wean off her O2 requirement of 2L. Her dizziness continued and 1 U PRBC was administered without resolution. Continued to encourage to be out of bed and the use of IS. Her dizziness continued to be an issue so IM was consulted for work up. IM suggested the dizziness is secondary to deconditioning, anemia from surgery. She was started on nebs for her inability to be weaned from O2. Pt had an o2 eval which showed the need for home oxygen at discharge. The pt was tolerating a diet with no nausea. She was set home in stable condition with supplemental oxygen. Consults: IM    Treatments: surgery: as stated above.     Discharge Exam:  General appearance: Alert, no acute distress, well-developed, well-nourished  HEENT: Normocephalic, extraocular movements grossly intact, moist mucous membranes  Chest/Lungs: normal inspiratory effort, symmetric chest rise, no accessory muscle use  Cardiovascular: Regular rate and rhythm, no murmurs  Abdomen: Soft, obese, appropriately tender to palpation throughout, incisions clean/dry/intact  Neuro: A&Ox3, no gross motor or sensory neuro deficits  Extremities: no edema, no cyanosis    Disposition: home    In process/preliminary results:  Outstanding Order Results     No orders found from 1/28/2021 to 2/27/2021. Patient Instructions:   Current Discharge Medication List      START taking these medications    Details   meclizine (ANTIVERT) 25 MG tablet Take 1 tablet by mouth 3 times daily as needed for Dizziness  Qty: 20 tablet, Refills: 0      oxyCODONE (ROXICODONE) 5 MG immediate release tablet Take 1 tablet by mouth every 6 hours as needed for Pain for up to 3 days. Qty: 12 tablet, Refills: 0    Comments: Reduce doses taken as pain becomes manageable  Associated Diagnoses: Post-op pain      ondansetron (ZOFRAN-ODT) 4 MG disintegrating tablet Take 1 tablet by mouth every 8 hours as needed for Nausea or Vomiting  Qty: 15 tablet, Refills: 0         CONTINUE these medications which have NOT CHANGED    Details   metoprolol tartrate (LOPRESSOR) 25 MG tablet TAKE 1 TABLET (25MG) BY MOUTH 2 TIMES A DAY (BLOOD PRESSURE)  Qty: 60 tablet, Refills: 0    Comments: DX Code Needed  .   Associated Diagnoses: Essential hypertension      Multiple Vitamins-Minerals (MULTIVITAMIN WITH MINERALS) tablet Take 1 tablet by mouth daily  Qty: 30 tablet, Refills: 10    Associated Diagnoses: Fatigue, unspecified type      vitamin D (ERGOCALCIFEROL) 1.25 MG (90315 UT) CAPS capsule TAKE 1 CAPSULE BY MOUTH EVERY MONTH  Qty: 1 capsule, Refills: 0      atorvastatin (LIPITOR) 40 MG tablet TAKE 1 TABLET (40MG) BY MOUTH EVERY DAY (CHOLESTEROL)  Qty: 30 tablet, Refills: 10      amLODIPine (NORVASC) 10 MG tablet TAKE 1 TABLET BY MOUTH EVERY DAY  Qty: 30 tablet, Refills: 5    Associated Diagnoses: Essential hypertension tablet TAKE 1 TABLET (10MG) BY MOUTH EVERY DAY (ALLERGIES)  Qty: 30 tablet, Refills: 12      albuterol sulfate  (90 Base) MCG/ACT inhaler INHALE 2 PUFFS BY MOUTH EVERY 6 HOURS AS NEEDED  Qty: 1 Inhaler, Refills: 5    Associated Diagnoses:  Moderate persistent asthma without complication      Blood Pressure KIT Ck blood pressure once a week  Qty: 1 kit, Refills: 0    Associated Diagnoses: Essential hypertension      pravastatin (PRAVACHOL) 20 MG tablet Take one tab a day cancel script pravastatin 10 mg  Qty: 30 tablet, Refills: 12      Blood Glucose Monitoring Suppl (TRUE METRIX METER) w/Device KIT Test glucose TID  Qty: 1 kit, Refills: 1    Associated Diagnoses: Controlled type 2 diabetes mellitus without complication, without long-term current use of insulin ([facility])      acetaminophen (TYLENOL) 500 MG tablet Take 1 tablet by mouth 4 times daily as needed for Pain  Qty: 120 tablet, Refills: 1    Associated Diagnoses: Chronic bilateral low back pain without sciatica      albuterol (PROVENTIL) (2.5 MG/3ML) 0.083% nebulizer solution INHALE CONTENTS OF 1 VIAL (3ML) BY MOUTH VIA NEBULIZER 3 TIMES A DAY  Qty: 270 mL, Refills: 0      B-D ULTRAFINE III SHORT PEN 31G X 8 MM MISC USE TO INJECT INSULIN (QID)  Qty: 129 each, Refills: 5      Alcohol Swabs PADS Use TID to test blood glucose  Qty: 200 each, Refills: 5    Associated Diagnoses: Controlled type 2 diabetes mellitus without complication, without long-term current use of insulin ([facility])      ACCU-CHEK SOFTCLIX LANCETS MISC USE TO TEST BLOOD SUGAR 3 TIMES A DAY  Qty: 100 each, Refills: 5    Associated Diagnoses: Controlled type 2 diabetes mellitus without complication, without long-term current use of insulin ([facility])      beclomethasone (QVAR) 80 MCG/ACT inhaler Inhale 2 puffs into the lungs 2 times daily  Qty: 1 Inhaler, Refills: 11      Blood Glucose Monitoring Suppl GRETCHEN Dispense one true track meter   Test glucose twice a day  Qty: 1 Device, Refills: 0    Associated Diagnoses: Controlled type 2 diabetes mellitus without complication, without long-term current use of insulin ([facility])      dorzolamide (TRUSOPT) 2 % ophthalmic solution   Refills: 6      Bimatoprost (LUMIGAN) 0.01 % SOLN Apply  to eye. One drop in each eye at bedtime       brimonidine (ALPHAGAN P) 0.1 % SOLN 1 drop 2 times daily.  One drop in both eyes every 12 hours         STOP taking these medications       metroNIDAZOLE (FLAGYL) 500 MG tablet Comments:   Reason for Stopping:         neomycin (MYCIFRADIN) 500 MG tablet Comments:   Reason for Stopping:         metroNIDAZOLE (FLAGYL) 500 MG tablet Comments:   Reason for Stopping:         neomycin (MYCIFRADIN) 500 MG tablet Comments:   Reason for Stopping:             Crescencio Rouse CNP  3/5/2021  11:35 AM  [phone]'

## 2021-03-05 NOTE — PROGRESS NOTES
Surgery Daily Progress Note      CC: Colon adenocarcinoma    SUBJECTIVE:  HDS and afebrile overnight. On 1L NC. Having bowel function. Tolerating diet. Denies nausea and vomiting. Voiding appropriately. ROS:   A 14 point review of systems was conducted, significant findings as noted above. All other systems negative. OBJECTIVE:    PHYSICAL EXAM:  Vitals:    03/04/21 2034 03/04/21 2318 03/04/21 2346 03/05/21 0320   BP: 137/75  (!) 141/66 (!) 157/82   Pulse: 95  89 92   Resp:  18 18 18   Temp:   97.9 °F (36.6 °C) 98.1 °F (36.7 °C)   TempSrc:   Oral Oral   SpO2:  94% 95% 94%   Weight:       Height:           General appearance: Alert, no acute distress, well-developed, well-nourished  HEENT: Normocephalic, extraocular movements grossly intact, moist mucous membranes  Chest/Lungs: normal inspiratory effort, symmetric chest rise, no accessory muscle use  Cardiovascular: Regular rate and rhythm, no murmurs  Abdomen: Soft, obese, appropriately tender to palpation throughout, incisions clean/dry/intact  Neuro: A&Ox3, no gross motor or sensory neuro deficits  Extremities: no edema, no cyanosis      ASSESSMENT & PLAN:   Brittanie Ochoa is a 65 y.o. female with history of DM who presented to [facility]/26 for elective surgery for her descending colon cancer, undergoing hand/robotic-assisted laparoscopic left colon resection 2/26.     - Continue regular diet  - Continue home medications  - Continue PO pain medication  - Encouraged OOB/ambulation  - PT/OT with 17 and 15 /24 respectively; okay for home discharge. - Home O2 evaluation today. - DC today after home oxygen evaluation.      Ulises Moss MD  General Surgery, PGY1  03/05/21  5:49 AM  [phone]

## 2021-03-05 NOTE — PROGRESS NOTES
Pt given discharge paperwork. Had meds already filled. Took out iv's no issues. Had no further questions.  Pt discharged at 1500

## 2021-03-06 ENCOUNTER — CARE COORDINATION (OUTPATIENT)
Dept: CASE MANAGEMENT | Age: 71
End: 2021-03-06

## 2021-03-06 NOTE — CARE COORDINATION
[facility] Transitions Initial Follow Up Call    Call within 2 business days of discharge: Yes    Patient: Jose Quintana Patient : 1950   MRN: <[id number]>  Reason for Admission:Cancer of descending colon  Discharge Date: 3/5/21 RARS: Readmission Risk Score: 22      Last Discharge [address]       Complaint Diagnosis Description Type Department Provider    21  Post-op pain . .. Admission (Discharged) [facility] 5 Sergio Macias MD           Spoke with: Called to speak with patient for update with transition of care. Left HIPPA compliant voice message with contact information [phone] for a call  Back with an update.     Facility:[facility]      Non-face-to-face services provided:      Care Transitions 24 Hour Call    Do you have all of your prescriptions and are they filled?: Yes  Care Transitions Interventions         Follow Up  Future Appointments   Date Time Provider Carina Dolan   3/22/2021  1:00 PM Chavo Nash MD [facility] JOHN Villalobos LPN

## 2021-03-08 ENCOUNTER — TELEPHONE (OUTPATIENT)
Dept: PRIMARY CARE CLINIC | Age: 71
End: 2021-03-08

## 2021-03-08 ENCOUNTER — CARE COORDINATION (OUTPATIENT)
Dept: CASE MANAGEMENT | Age: 71
End: 2021-03-08

## 2021-03-08 DIAGNOSIS — K62.5 RECTAL BLEEDING: Primary | ICD-10-CM

## 2021-03-08 PROCEDURE — 1111F DSCHRG MED/CURRENT MED MERGE: CPT

## 2021-03-08 NOTE — TELEPHONE ENCOUNTER
Med request:   Pt is calling because she is needing the liquid to the Albuterol inhaler.     pls send to [address] [place] [address]) [phone]     Pt lilia: [phone]

## 2021-03-08 NOTE — CARE COORDINATION
Joaquin [facility] Transitions Initial Follow Up Call    Call within 2 business days of discharge: yes     Patient: Claude Steele Patient : 1950   MRN: [id number]   Reason for Admission: bowel resection  Discharge Date: 3/5/21 RARS: Readmission Risk Score: 22      Last Discharge [facility]       Complaint Diagnosis Description Type Department Provider    21  Post-op pain . .. Admission (Discharged) P.ODuane Zelaya MD           Spoke with: Lalo: [facility]      Non-face-to-face services provided:  Obtained and reviewed discharge summary and/or continuity of care documents  Communication with home health agencies or other community services the patient is currently using-   Education of patient/family/caregiver/guardian to support self-management-   Assessment and support for treatment adherence and medication management-      Care Transitions 24 Hour Call    Do you have any ongoing symptoms?: Yes  Patient-reported symptoms: Other (Comment: dizziness)  Interventions for patient-reported symptoms: Other  Do you have a copy of your discharge instructions?: Yes  Do you have all of your prescriptions and are they filled?: Yes  Have you been contacted by a [address]?: No  Have you scheduled your follow up appointment?: Yes  Were you discharged with any Home Care or Post Acute Services: Yes  Post Acute Services: Home Health (Comment: [facility])  Do you feel like you have everything you need to keep you well at home?: Yes  Care Transitions Interventions       Challenges to be reviewed by the provider   Additional needs identified to be addressed with provider:    NO    Discussed COVID-19 related testing which was available at this time     Test results were available     Patient informed of results, if available? Yes                Method of communication with provider :   phone    Was this a readmission?     no    Care Transition Nurse (CTN) contacted the patient by telephone to perform post hospital discharge assessment. Verified name with patient as identifier. Provided introduction to self, and explanation of the CTN role. CTN reviewed discharge instructions, medical action plan and red flags with patient who verbalized understanding. Patient given an opportunity to ask questions and does not have any further questions or concerns at this time. Were discharge instructions available to patient? Yes      Reviewed appropriate site of care based on symptoms and resources available to patient including:      PCP   Specialist   After hour contact number   Urgent [phone]    When to call 911     The patient agrees to contact the PCP office for questions related to their healthcare. Medication reconciliation was performed with Patient, who verbalizes understanding of administration of home medications. Advised obtaining a 90-day supply of all daily and as-needed medications. Covid Risk Education    Patient has following risk factors of:   Asthma   Chronic kidney dx  DM   Immunocompromised    Education provided regarding infection prevention, and signs and symptoms of COVID-19 and when to seek medical attention with patient who verbalized understanding. Discussed exposure protocols and quarantine From CDC: Are you at higher risk for severe illness?   and given an opportunity for questions and concerns. The patient agrees to contact the COVID-19 hotline [phone] or PCP office for questions related to COVID-19. For more information on steps you can take to protect yourself, see CDC's How to Protect Yourself     Patient given information for [facility] and agrees to enroll:  yes and declined     Patient's preferred e-mail:   Patient's preferred phone number:     Discussed follow-up appointments.  If no appointment was previously scheduled, appointment scheduling offered:  Pt states she has scheduled with surgeon     Is follow up appointment scheduled within 7 days of discharge? NO     Non-[facility] follow up appointment(s):  no    Plan for f/u call in 3-5 days based on severity of symptoms and risk factors. CTN provided contact information for future needs. SUMMARY  CTN spoke with Almas Laurent with [facility] who confirms [facility] order was received for the Pt. SOC was 3/6      CTC spoke to the Pt who denies all issues except feeling dizzy at time. CTN encouraged Pt to exercise caution whem ambulating to prevent fall / injury. CTN and Pt reviewed meds and CTC completed the 1111f. Pt states she has scheduled with surgeon for [place]. Pt will take all meds as prescribed and schedule / keep doctors appt. CTC provided education on s/s that require medical attention and when to seek medical attention. CTC advised Pt of use urgent care or physicians 24 hr access line if assistance is needed after hours or on the weekend. Pt denies any needs or concerns and is agreeable with additional calls.     Follow Up  Future Appointments   Date Time Provider Carina Dolan   3/22/2021  1:00 PM MD Karan Caldera RD [facility], [place]

## 2021-03-09 RX ORDER — ALBUTEROL SULFATE 2.5 MG/3ML
SOLUTION RESPIRATORY (INHALATION)
Qty: 270 ML | Refills: 0 | Status: CANCELLED | OUTPATIENT
Start: 2021-03-09

## 2021-03-09 RX ORDER — ALBUTEROL SULFATE 2.5 MG/3ML
2.5 SOLUTION RESPIRATORY (INHALATION) 3 TIMES DAILY PRN
Qty: 270 ML | Refills: 1 | Status: SHIPPED | OUTPATIENT
Start: 2021-03-09

## 2021-03-11 ENCOUNTER — CARE COORDINATION (OUTPATIENT)
Dept: CASE MANAGEMENT | Age: 71
End: 2021-03-11

## 2021-03-11 NOTE — CARE COORDINATION
Joaquin 45 Transitions Follow Up Call    3/11/2021    Patient: Marybeth Moise  Patient : 1950   MRN: <[id number]>  Reason for Admission: bowel resection  Discharge Date: 3/5/21 RARS: Readmission Risk Score: 25         Spoke with: Called to speak with patient for update with transition of care. Left HIPPA compliant voice message with contact information [phone] for a call  Back with an update. Care Transitions Subsequent and Final Call    Subsequent and Final Calls  Care Transitions Interventions  Other Interventions:            Follow Up  Future Appointments   Date Time Provider Carina Dolan   3/22/2021  1:00 PM MD Karan Yu RD [facility]   3/23/2021 11:30 AM Norah Brothers MD [facility]       Santiago Ferguson LPN

## 2021-03-12 ENCOUNTER — TELEPHONE (OUTPATIENT)
Dept: PRIMARY CARE CLINIC | Age: 71
End: 2021-03-12

## 2021-03-15 NOTE — TELEPHONE ENCOUNTER
Medication:   Requested Prescriptions     Signed Prescriptions Disp Refills    insulin regular (HUMULIN R) 100 UNIT/ML injection 10 mL 5     Sig: Inject 4 Units into the skin See Admin Instructions Before meals for blood sugars  > 150     Authorizing Provider: Roland Quintanilla    insulin NPH (HUMULIN N) 100 UNIT/ML injection vial 2 vial 10     Sig: Inject 13 units twice a day under skin     Authorizing Provider: Roland Quintanilla        Last Filled:      Patient Phone Number: [phone] (home) [phone] (work)    Last appt: 2/22/2021   Next appt: 3/22/2021    Last OARRS: No flowsheet data found.

## 2021-03-16 ENCOUNTER — CARE COORDINATION (OUTPATIENT)
Dept: CASE MANAGEMENT | Age: 71
End: 2021-03-16

## 2021-03-16 RX ORDER — NEBULIZER ACCESSORIES
1 KIT MISCELLANEOUS DAILY PRN
Qty: 1 KIT | Refills: 0 | Status: SHIPPED | OUTPATIENT
Start: 2021-03-16

## 2021-03-16 NOTE — TELEPHONE ENCOUNTER
Medication:   Requested Prescriptions     Pending Prescriptions Disp Refills    Respiratory Therapy Supplies (NEBULIZER/TUBING/MOUTHPIECE) KIT 1 kit 0     Si kit by Does not apply route daily as needed        Last Filled:      Patient Phone Number: [phone] (home) [phone] (work)    Last appt: 2021   Next appt: 3/22/2021    Last OARRS: No flowsheet data found.

## 2021-03-17 ENCOUNTER — TELEPHONE (OUTPATIENT)
Dept: SURGERY | Age: 71
End: 2021-03-17

## 2021-03-17 DIAGNOSIS — Z79.4 TYPE 2 DIABETES MELLITUS WITHOUT COMPLICATION, WITH LONG-TERM CURRENT USE OF INSULIN (HCC): Primary | ICD-10-CM

## 2021-03-17 DIAGNOSIS — E11.9 TYPE 2 DIABETES MELLITUS WITHOUT COMPLICATION, WITH LONG-TERM CURRENT USE OF INSULIN (HCC): Primary | ICD-10-CM

## 2021-03-17 NOTE — TELEPHONE ENCOUNTER
No more pain meds at this point after surgery. Can use tylenol and motrin.  She should talk to her PCP about the leg swelling

## 2021-03-17 NOTE — TELEPHONE ENCOUNTER
Cricketchalo BlackwellDerrick, patient's daughter, called in requesting the patient's pain medication be refilled    Cricket Meza also states that the patient's legs, feet and ankles are swollen    Please contact Lisa at [phone]

## 2021-03-22 ENCOUNTER — OFFICE VISIT (OUTPATIENT)
Dept: PRIMARY CARE CLINIC | Age: 71
End: 2021-03-22
Payer: MEDICARE

## 2021-03-22 VITALS
DIASTOLIC BLOOD PRESSURE: 79 MMHG | OXYGEN SATURATION: 100 % | BODY MASS INDEX: 42.97 KG/M2 | WEIGHT: 220 LBS | TEMPERATURE: 98.4 F | SYSTOLIC BLOOD PRESSURE: 145 MMHG | HEART RATE: 70 BPM

## 2021-03-22 DIAGNOSIS — I10 ESSENTIAL HYPERTENSION: ICD-10-CM

## 2021-03-22 DIAGNOSIS — Z91.81 AT HIGH RISK FOR FALLS: ICD-10-CM

## 2021-03-22 DIAGNOSIS — E11.9 CONTROLLED TYPE 2 DIABETES MELLITUS WITHOUT COMPLICATION, WITHOUT LONG-TERM CURRENT USE OF INSULIN (HCC): ICD-10-CM

## 2021-03-22 DIAGNOSIS — Z90.49 S/P COLECTOMY: ICD-10-CM

## 2021-03-22 DIAGNOSIS — R53.83 FATIGUE, UNSPECIFIED TYPE: ICD-10-CM

## 2021-03-22 DIAGNOSIS — D50.0 IRON DEFICIENCY ANEMIA DUE TO CHRONIC BLOOD LOSS: ICD-10-CM

## 2021-03-22 DIAGNOSIS — J98.11 ATELECTASIS PULMONARY: Primary | ICD-10-CM

## 2021-03-22 DIAGNOSIS — R42 DIZZY SPELLS: ICD-10-CM

## 2021-03-22 PROCEDURE — 3017F COLORECTAL CA SCREEN DOC REV: CPT | Performed by: FAMILY MEDICINE

## 2021-03-22 PROCEDURE — G8427 DOCREV CUR MEDS BY ELIG CLIN: HCPCS | Performed by: FAMILY MEDICINE

## 2021-03-22 PROCEDURE — G8484 FLU IMMUNIZE NO ADMIN: HCPCS | Performed by: FAMILY MEDICINE

## 2021-03-22 PROCEDURE — G8417 CALC BMI ABV UP PARAM F/U: HCPCS | Performed by: FAMILY MEDICINE

## 2021-03-22 PROCEDURE — 1123F ACP DISCUSS/DSCN MKR DOCD: CPT | Performed by: FAMILY MEDICINE

## 2021-03-22 PROCEDURE — G8399 PT W/DXA RESULTS DOCUMENT: HCPCS | Performed by: FAMILY MEDICINE

## 2021-03-22 PROCEDURE — 4040F PNEUMOC VAC/ADMIN/RCVD: CPT | Performed by: FAMILY MEDICINE

## 2021-03-22 PROCEDURE — 1036F TOBACCO NON-USER: CPT | Performed by: FAMILY MEDICINE

## 2021-03-22 PROCEDURE — 1111F DSCHRG MED/CURRENT MED MERGE: CPT | Performed by: FAMILY MEDICINE

## 2021-03-22 PROCEDURE — 2022F DILAT RTA XM EVC RTNOPTHY: CPT | Performed by: FAMILY MEDICINE

## 2021-03-22 PROCEDURE — 1090F PRES/ABSN URINE INCON ASSESS: CPT | Performed by: FAMILY MEDICINE

## 2021-03-22 PROCEDURE — 99214 OFFICE O/P EST MOD 30 MIN: CPT | Performed by: FAMILY MEDICINE

## 2021-03-22 PROCEDURE — 3044F HG A1C LEVEL LT 7.0%: CPT | Performed by: FAMILY MEDICINE

## 2021-03-22 RX ORDER — MULTIVIT-MIN/IRON FUM/FOLIC AC 7.5 MG-4
1 TABLET ORAL DAILY
Qty: 30 TABLET | Refills: 10 | Status: SHIPPED | OUTPATIENT
Start: 2021-03-22

## 2021-03-22 ASSESSMENT — COLUMBIA-SUICIDE SEVERITY RATING SCALE - C-SSRS
6. HAVE YOU EVER DONE ANYTHING, STARTED TO DO ANYTHING, OR PREPARED TO DO ANYTHING TO END YOUR LIFE?: NO
2. HAVE YOU ACTUALLY HAD ANY THOUGHTS OF KILLING YOURSELF?: NO

## 2021-03-22 ASSESSMENT — ENCOUNTER SYMPTOMS
EYE ITCHING: 0
DIARRHEA: 0
EYE REDNESS: 0
WHEEZING: 0
STRIDOR: 0
SINUS PRESSURE: 0
NAUSEA: 0
BACK PAIN: 0
RECTAL PAIN: 0
ABDOMINAL DISTENTION: 0
CONSTIPATION: 0
CHOKING: 0
APNEA: 0
BLOOD IN STOOL: 0
EYE PAIN: 0
VOMITING: 0
RHINORRHEA: 0
SHORTNESS OF BREATH: 0
CHEST TIGHTNESS: 0
SORE THROAT: 0
TROUBLE SWALLOWING: 0
COLOR CHANGE: 0
EYE DISCHARGE: 0
COUGH: 0
PHOTOPHOBIA: 0

## 2021-03-22 ASSESSMENT — PATIENT HEALTH QUESTIONNAIRE - PHQ9
SUM OF ALL RESPONSES TO PHQ9 QUESTIONS 1 & 2: 4
4. FEELING TIRED OR HAVING LITTLE ENERGY: 3
6. FEELING BAD ABOUT YOURSELF - OR THAT YOU ARE A FAILURE OR HAVE LET YOURSELF OR YOUR FAMILY DOWN: 3
1. LITTLE INTEREST OR PLEASURE IN DOING THINGS: 2
SUM OF ALL RESPONSES TO PHQ QUESTIONS 1-9: 15
SUM OF ALL RESPONSES TO PHQ QUESTIONS 1-9: 15
10. IF YOU CHECKED OFF ANY PROBLEMS, HOW DIFFICULT HAVE THESE PROBLEMS MADE IT FOR YOU TO DO YOUR WORK, TAKE CARE OF THINGS AT HOME, OR GET ALONG WITH OTHER PEOPLE: 1

## 2021-03-22 NOTE — PROGRESS NOTES
Post-Discharge Transitional Care Management Services or Hospital Follow Up      Claude Steele   YOB: 1950    Date of Office Visit:  3/22/2021  Date of Hospital Admission: 2/26/21  Date of Hospital Discharge: 3/5/21  Readmission Risk Score(high >=14%.  Medium >=10%):Readmission Risk Score: 22      Care management risk score Rising risk (score 2-5) and Complex Care (Scores >=6): 7     Non face to face  following discharge, date last encounter closed (first attempt may have been earlier): *No documented post hospital discharge outreach found in the last 14 days *No documented post hospital discharge outreach found in the last 14 days    Call initiated 2 business days of discharge: *No response recorded in the last 14 days     Patient Active Problem List   Diagnosis    DM type 2 (diabetes mellitus, type 2)    Sinus congestion    Hypertension    Tear of lateral cartilage or meniscus of knee, current    Rectal bleeding    Patellofemoral arthritis    Hyperlipidemia    CKD (chronic kidney disease) stage 3, GFR 30-59 ml/min ([facility])    Moderate persistent asthma without complication    Morbid obesity due to excess calories (HCC)    Asthma    Essential hypertension    Lateral meniscus tear    Cancer of descending colon ([facility] [address]    Cancer of left colon ([facility])       Allergies   Allergen Reactions    Pantoprazole Sodium Hives    Bactrim [Sulfamethoxazole-Trimethoprim]      ITCHING HIVES, NO RESPIRATORY SXS    Enalapril Swelling     angioedema    Lisinopril Swelling     Lip swelling/angioedema    Milk-Related Compounds Other (See Comments)     Caused GI bleeding (cow's milk)    Pcn [Penicillins] Hives    Tape [Adhesive Tape] Other (See Comments)     Paper tape causes redness, irritation       Medications listed as ordered at the time of discharge from hospital   Beth JOE   Home Medication Instructions ASHLEY:    Printed on:03/22/21 7376   Medication Information ACCU-CHEK SOFTCLIX LANCETS MISC  USE TO TEST BLOOD SUGAR 3 TIMES A DAY             acetaminophen (TYLENOL) 500 MG tablet  Take 1 tablet by mouth 4 times daily as needed for Pain             albuterol (PROVENTIL) (2.5 MG/3ML) 0.083% nebulizer solution  Take 3 mLs by nebulization 3 times daily as needed for Wheezing or Shortness of Breath             albuterol sulfate  (90 Base) MCG/ACT inhaler  INHALE 2 PUFFS BY MOUTH EVERY 6 HOURS AS NEEDED             Alcohol Swabs PADS  Use TID to test blood glucose             alendronate (FOSAMAX) 70 MG tablet  TAKE 1 TAB (70MG) BY MOUTH PER WK BEFORE BREAKFAST EMPTY STOMACH SIT U P 30MIN (BONES)             amLODIPine (NORVASC) 10 MG tablet  TAKE 1 TABLET BY MOUTH EVERY DAY             atorvastatin (LIPITOR) 40 MG tablet  TAKE 1 TABLET (40MG) BY MOUTH EVERY DAY (CHOLESTEROL)             B-D ULTRAFINE III SHORT PEN 31G X 8 MM MISC  USE TO INJECT INSULIN (QID)             beclomethasone (QVAR) 80 MCG/ACT inhaler  Inhale 2 puffs into the lungs 2 times daily             Bimatoprost (LUMIGAN) 0.01 % SOLN  Apply  to eye. One drop in each eye at bedtime              Blood Glucose Monitoring Suppl (TRUE METRIX METER) w/Device KIT  Test glucose TID             Blood Glucose Monitoring Suppl GRETCHEN  Dispense one true track meter   Test glucose twice a day             blood glucose test strips (ACCU-CHEK BRAVO PLUS) strip  USE TO TEST GLUCOSE THREE TIMES DAILY             Blood Pressure KIT  Ck blood pressure once a week             brimonidine (ALPHAGAN P) 0.1 % SOLN  1 drop 2 times daily.  One drop in both eyes every 12 hours             dorzolamide (TRUSOPT) 2 % ophthalmic solution               Exenatide (BYDUREON) 2 MG PEN  Inject 1 pen into the skin every 7 days             famotidine (PEPCID) 20 MG tablet  One a day             fluticasone (FLONASE) 50 MCG/ACT nasal spray  1 spray by Nasal route daily             hydroCHLOROthiazide (HYDRODIURIL) 25 MG tablet  TAKE 1 TABLET (25MG) BY MOUTH EVERY DAY (DIURECTIC/ BLOOD PRESSURE)             insulin NPH (HUMULIN N) 100 UNIT/ML injection vial  Inject 13 units twice a day under skin             insulin regular (HUMULIN R) 100 UNIT/ML injection  Inject 4 Units into the skin See Admin Instructions Before meals for blood sugars  > 150             loratadine (ALLERGY RELIEF) 10 MG tablet  TAKE 1 TABLET (10MG) BY MOUTH EVERY DAY (ALLERGIES)             metFORMIN (GLUCOPHAGE) 1000 MG tablet  TAKE 1 TABLET (1000MG) BY MOUTH 2 TIMES A DAY WITH MEALS (BLOOD SUGAR/DIABETES)             metoprolol tartrate (LOPRESSOR) 25 MG tablet  TAKE 1 TABLET (25MG) BY MOUTH 2 TIMES A DAY (BLOOD PRESSURE)             montelukast (SINGULAIR) 10 MG tablet  TAKE 1 TABLET (10MG) BY MOUTH EVERY DAY             Multiple Vitamins-Minerals (MULTIVITAMIN WITH MINERALS) tablet  Take 1 tablet by mouth daily             ondansetron (ZOFRAN-ODT) 4 MG disintegrating tablet  Take 1 tablet by mouth every 8 hours as needed for Nausea or Vomiting             OXYGEN  Inhale into the lungs             pravastatin (PRAVACHOL) 20 MG tablet  Take one tab a day cancel script pravastatin 10 mg             Respiratory Therapy Supplies (NEBULIZER/TUBING/MOUTHPIECE) KIT  1 kit by Does not apply route daily as needed (PRN)             sertraline (ZOLOFT) 50 MG tablet  TAKE 2 TABLETS (100MG) BY MOUTH 2 TIMES A DAY (MOOD)             Tiotropium Bromide-Olodaterol (STIOLTO RESPIMAT) 2.5-2.5 MCG/ACT AERS  2 ouffs once a day             tiZANidine (ZANAFLEX) 2 MG tablet  One or two tabs prn bedtime             vitamin D (ERGOCALCIFEROL) 1.25 MG (69194 UT) CAPS capsule  TAKE 1 CAPSULE BY MOUTH EVERY MONTH                   Medications marked \"taking\" at this time  Outpatient Medications Marked as Taking for the 3/22/21 encounter (Office Visit) with Marbin Jorgensen MD   Medication Sig Dispense Refill    Multiple Vitamins-Minerals (MULTIVITAMIN WITH MINERALS) tablet Take 1 tablet by mouth daily 30 tablet 10    insulin regular (HUMULIN R) 100 UNIT/ML injection Inject 4 Units into the skin See Admin Instructions Before meals for blood sugars  > 150 10 mL 5    insulin NPH (HUMULIN N) 100 UNIT/ML injection vial Inject 13 units twice a day under skin 23 mL 1    Respiratory Therapy Supplies (NEBULIZER/TUBING/MOUTHPIECE) KIT 1 kit by Does not apply route daily as needed (PRN) 1 kit 0    albuterol (PROVENTIL) (2.5 MG/3ML) 0.083% nebulizer solution Take 3 mLs by nebulization 3 times daily as needed for Wheezing or Shortness of Breath 270 mL 1    OXYGEN Inhale into the lungs      ondansetron (ZOFRAN-ODT) 4 MG disintegrating tablet Take 1 tablet by mouth every 8 hours as needed for Nausea or Vomiting 15 tablet 0    sertraline (ZOLOFT) 50 MG tablet TAKE 2 TABLETS (100MG) BY MOUTH 2 TIMES A DAY (MOOD) 120 tablet 0    metoprolol tartrate (LOPRESSOR) 25 MG tablet TAKE 1 TABLET (25MG) BY MOUTH 2 TIMES A DAY (BLOOD PRESSURE) 60 tablet 0    famotidine (PEPCID) 20 MG tablet One a day 60 tablet 10    hydroCHLOROthiazide (HYDRODIURIL) 25 MG tablet TAKE 1 TABLET (25MG) BY MOUTH EVERY DAY (DIURECTIC/ BLOOD PRESSURE) 30 tablet 10    vitamin D (ERGOCALCIFEROL) 1.25 MG (84601 UT) CAPS capsule TAKE 1 CAPSULE BY MOUTH EVERY MONTH 1 capsule 0    fluticasone (FLONASE) 50 MCG/ACT nasal spray 1 spray by Nasal route daily 1 Bottle 5    montelukast (SINGULAIR) 10 MG tablet TAKE 1 TABLET (10MG) BY MOUTH EVERY DAY 30 tablet 3    Exenatide (BYDUREON) 2 MG PEN Inject 1 pen into the skin every 7 days 4 pen 5    atorvastatin (LIPITOR) 40 MG tablet TAKE 1 TABLET (40MG) BY MOUTH EVERY DAY (CHOLESTEROL) 30 tablet 10    alendronate (FOSAMAX) 70 MG tablet TAKE 1 TAB (70MG) BY MOUTH PER WK BEFORE BREAKFAST EMPTY STOMACH SIT U P 30MIN (BONES) 4 tablet 3    amLODIPine (NORVASC) 10 MG tablet TAKE 1 TABLET BY MOUTH EVERY DAY 30 tablet 5    Tiotropium Bromide-Olodaterol (STIOLTO RESPIMAT) 2.5-2.5 MCG/ACT AERS 2 ouffs once a day 1 Inhaler 11  metFORMIN (GLUCOPHAGE) 1000 MG tablet TAKE 1 TABLET (1000MG) BY MOUTH 2 TIMES A DAY WITH MEALS (BLOOD SUGAR/DIABETES) 60 tablet 12    blood glucose test strips (ACCU-CHEK BRAVO PLUS) strip USE TO TEST GLUCOSE THREE TIMES DAILY 100 strip 10    tiZANidine (ZANAFLEX) 2 MG tablet One or two tabs prn bedtime 30 tablet 1    loratadine (ALLERGY RELIEF) 10 MG tablet TAKE 1 TABLET (10MG) BY MOUTH EVERY DAY (ALLERGIES) 30 tablet 12    albuterol sulfate  (90 Base) MCG/ACT inhaler INHALE 2 PUFFS BY MOUTH EVERY 6 HOURS AS NEEDED 1 Inhaler 5    Blood Pressure KIT Ck blood pressure once a week 1 kit 0    pravastatin (PRAVACHOL) 20 MG tablet Take one tab a day cancel script pravastatin 10 mg 30 tablet 12    Blood Glucose Monitoring Suppl (TRUE METRIX METER) w/Device KIT Test glucose TID 1 kit 1    acetaminophen (TYLENOL) 500 MG tablet Take 1 tablet by mouth 4 times daily as needed for Pain 120 tablet 1    B-D ULTRAFINE III SHORT PEN 31G X 8 MM MISC USE TO INJECT INSULIN (QID) 129 each 5    Alcohol Swabs PADS Use TID to test blood glucose 200 each 5    ACCU-CHEK SOFTCLIX LANCETS MISC USE TO TEST BLOOD SUGAR 3 TIMES A  each 5    beclomethasone (QVAR) 80 MCG/ACT inhaler Inhale 2 puffs into the lungs 2 times daily 1 Inhaler 11    Blood Glucose Monitoring Suppl GRETCHEN Dispense one true track meter   Test glucose twice a day 1 Device 0    dorzolamide (TRUSOPT) 2 % ophthalmic solution   6    Bimatoprost (LUMIGAN) 0.01 % SOLN Apply  to eye. One drop in each eye at bedtime       brimonidine (ALPHAGAN P) 0.1 % SOLN 1 drop 2 times daily. One drop in both eyes every 12 hours          Medications patient taking as of now reconciled against medications ordered at time of hospital discharge:     Chief Complaint   Patient presents with   [address] from [facility] SKYL[age] y/o female PMH of HTN, DM-2, HLD hosp.  For ascending colon cancer  And underwent robotic assisted laparoscopic left colectomy with colocolonic anastomosis per Dr Martina Stovall . She did have difficulty weaning off O2  After surgery. Neg CTA for PE. Some atelectasis. Anemia. She is diabetic(AIC 6.7  2/26/21) hypertensive and hyperlipidemia. She has Home O2  LPM  radiation information found for this patient   Narrative   CT PULMONARY ANGIOGRAPHY OF THE CHEST        History: Pleuritic chest pain. Shortness of breath.       PROCEDURE: 100 ml of Omnipaque 350 contrast material injected as a bolus with subsequent thin 1.25 mm sections with MIP rendered reconstructions, reviewed in 3 dimensions on a separate computerized workstation.  Individualized dose optimization technique    was used in order to meet ALARA standards for radiation dose reduction.  In addition to vendor specific dose reduction algorithms, the dose reduction techniques vary based on the specific scanner utilized but frequently include automated exposure    control, adjustment of the mA and/or kV according to patient size, and use of iterative reconstruction technique.       FINDINGS:        The central airways are patent.       There are moderate bilateral pleural effusions are identified. Both trace dependent consolidation is present in the lower lobes.       There is no evidence of axillary, hilar, or mediastinal adenopathy.  The heart and great vessels are normal.       There is no evidence of pulmonary embolism to the segmental level.        The imaged abdomen shows no additional abnormalities.        The bones show no suspicious lesions.               Impression       1. No evidence of pulmonary embolism to the segmental level.        2. Moderate bilateral pleural effusions are present. There is trace, depending consolidation lower lobe which is likely atelectasis.           Inpatient course: Discharge summary reviewed- see chart.     Interval history/Current status currently she has had PT/OT  She is using a wheelchair and has tank Oxygen  Uses wheelchair intermittently since left ankle fracture  5 years ago. Dizzy spells and balance problems for about 2 yrs intermittently. Feels like she is spinning    She has fatigue, she was anemic in hosp. Lab Results   Component Value Date    WBC 12.8 (H) 03/05/2021    HGB 8.4 (L) 03/05/2021    HCT 25.4 (L) 03/05/2021    MCV 88.5 03/05/2021     (H) 03/05/2021         Review of Systems   Constitutional: Negative for activity change, appetite change, chills, diaphoresis, fatigue and fever. HENT: Negative for congestion, dental problem, drooling, ear discharge, ear pain, hearing loss, mouth sores, nosebleeds, postnasal drip, rhinorrhea, sinus pressure, sneezing, sore throat, tinnitus and trouble swallowing. Eyes: Negative for photophobia, pain, discharge, redness, itching and visual disturbance. Respiratory: Negative for apnea, cough, choking, chest tightness, shortness of breath, wheezing and stridor. Cardiovascular: Negative for chest pain, palpitations and leg swelling. Gastrointestinal: Negative for abdominal distention, blood in stool, constipation, diarrhea, nausea, rectal pain and vomiting. Genitourinary: Negative for decreased urine volume, difficulty urinating, dyspareunia, dysuria, enuresis, flank pain, frequency, genital sores, hematuria, menstrual problem, pelvic pain, urgency, vaginal bleeding and vaginal pain. Musculoskeletal: Negative for arthralgias, back pain, gait problem, joint swelling, myalgias, neck pain and neck stiffness. Skin: Negative for color change, pallor, rash and wound. Neurological: Negative for dizziness, tremors, seizures, syncope, facial asymmetry, speech difficulty, weakness, light-headedness, numbness and headaches. Hematological: Negative for adenopathy. Does not bruise/bleed easily. Psychiatric/Behavioral: Negative for agitation, behavioral problems, confusion, decreased concentration, dysphoric mood, hallucinations, self-injury, sleep disturbance and suicidal ideas.  The patient is not nervous/anxious and is not hyperactive. Vitals:    03/22/21 1343   BP: (!) 145/79   Site: Left Upper Arm   Position: Sitting   Cuff Size: Large Adult   Pulse: 70   Temp: 98.4 °F (36.9 °C)   SpO2: 100%   Weight: 220 lb (99.8 kg)     Body mass index is 42.97 kg/m². Wt Readings from Last 3 Encounters:   03/22/21 220 lb (99.8 kg)   02/26/21 222 lb (100.7 kg)   02/22/21 222 lb (100.7 kg)     BP Readings from Last 3 Encounters:   03/22/21 (!) 145/79   03/05/21 (!) 167/72   02/26/21 (!) 134/105       Physical Exam  Constitutional:       General: She is not in acute distress. Appearance: She is well-developed. She is not diaphoretic. HENT:      Head: Normocephalic and atraumatic. Right Ear: External ear normal.      Left Ear: External ear normal.      Nose: Nose normal.      Mouth/Throat:      Pharynx: No oropharyngeal exudate. Eyes:      General: No scleral icterus. Left eye: No discharge. Conjunctiva/sclera: Conjunctivae normal.      Pupils: Pupils are equal, round, and reactive to light. Neck:      Musculoskeletal: Normal range of motion and neck supple. Thyroid: No thyromegaly. Vascular: No JVD. Trachea: No tracheal deviation. Cardiovascular:      Rate and Rhythm: Normal rate and regular rhythm. Heart sounds: Normal heart sounds. No murmur. No friction rub. No gallop. Pulmonary:      Effort: Pulmonary effort is normal. No respiratory distress. Breath sounds: Normal breath sounds. No stridor. No wheezing or rales. Chest:      Chest wall: No tenderness. Abdominal:      General: Bowel sounds are normal. There is no distension. Palpations: Abdomen is soft. There is no mass. Tenderness: There is no abdominal tenderness. There is no guarding or rebound. Musculoskeletal: Normal range of motion. General: No tenderness. Lymphadenopathy:      Cervical: No cervical adenopathy. Skin:     General: Skin is warm and dry.       Coloration: Skin is not pale. Findings: No erythema or rash. Neurological:      Mental Status: She is alert and oriented to person, place, and time. Cranial Nerves: No cranial nerve deficit. Coordination: Coordination normal.      Deep Tendon Reflexes: Reflexes are normal and symmetric. Reflexes normal.   Psychiatric:         Behavior: Behavior normal.         Thought Content: Thought content normal.         Judgment: Judgment normal.           1. Atelectasis pulmonary  02 pulse OX 96 RA  Stop 02    2. S/P colectomy      3. Controlled type 2 diabetes mellitus without complication, without long-term current use of insulin ([facility])    -  DIABETES FOOT EXAM  - MICROALBUMIN / CREATININE URINE RATIO; Future    4. Essential hypertension      5. Fatigue, unspecified type    - Multiple Vitamins-Minerals (MULTIVITAMIN WITH MINERALS) tablet; Take 1 tablet by mouth daily  Dispense: 30 tablet; Refill: 10  - CBC; Future    6. Iron deficiency anemia due to chronic blood loss      7.  At high risk for falls      Dizzy spells  Needs ENT consult

## 2021-03-23 ENCOUNTER — OFFICE VISIT (OUTPATIENT)
Dept: SURGERY | Age: 71
End: 2021-03-23

## 2021-03-23 ENCOUNTER — CARE COORDINATION (OUTPATIENT)
Dept: CASE MANAGEMENT | Age: 71
End: 2021-03-23

## 2021-03-23 VITALS
OXYGEN SATURATION: 94 % | SYSTOLIC BLOOD PRESSURE: 152 MMHG | WEIGHT: 219 LBS | DIASTOLIC BLOOD PRESSURE: 72 MMHG | TEMPERATURE: 97.4 F | BODY MASS INDEX: 42.77 KG/M2 | HEART RATE: 78 BPM

## 2021-03-23 DIAGNOSIS — C18.6 CANCER OF LEFT COLON (HCC): Primary | ICD-10-CM

## 2021-03-23 LAB
HCT VFR BLD CALC: 30.6 % (ref 36–48)
HEMOGLOBIN: 9.9 G/DL (ref 12–16)
MCH RBC QN AUTO: 28.7 PG (ref 26–34)
MCHC RBC AUTO-ENTMCNC: 32.4 G/DL (ref 31–36)
MCV RBC AUTO: 88.8 FL (ref 80–100)
PDW BLD-RTO: 16.9 % (ref 12.4–15.4)
PLATELET # BLD: 368 K/UL (ref 135–450)
PMV BLD AUTO: 7.8 FL (ref 5–10.5)
RBC # BLD: 3.45 M/UL (ref 4–5.2)
WBC # BLD: 7.4 K/UL (ref 4–11)

## 2021-03-23 PROCEDURE — 99024 POSTOP FOLLOW-UP VISIT: CPT | Performed by: SURGERY

## 2021-03-23 NOTE — CARE COORDINATION
[facility] Transitions Follow Up Call    3/23/2021    Patient: Aldo Bergman  Patient : 1950   MRN: [id number]   Reason for Admission:  Bowel resection  Discharge Date: 3/5/21 RARS: Readmission Risk Score: 25         Spoke with: [address] Transitions Subsequent and Final Call    Subsequent and Final Calls  Do you have any ongoing symptoms?: Yes  Patient-reported symptoms: Other  Interventions for patient-reported symptoms: Other  Have your medications changed?: No  Do you have any questions related to your medications?: No  Do you currently have any active services?: Yes  Are you currently active with any services?: Home Health  Do you have any needs or concerns that I can assist you with?: No  Identified Barriers: None  Care Transitions Interventions  Other Interventions:         Summary  CTN spoke to the Pt today who reports she continues to have c/o dizziness but has not gotten any worse. Pt also denies any falls since d/c home and was encouraged by CTN to exercise caution when ambulating to prevent falls. Pt states she was referred to an ENT and has appt on . Pt also reports seeing surgeon today and denies any changes to meds. From CDC: Are you at higher risk for severe illness?  Wash your hands often.  Avoid close contact (6 feet, which is about two arm lengths) with people who are sick.  Put distance between yourself and other people if COVID-19 is spreading in your community.  Clean and disinfect frequently touched surfaces.  Avoid all cruise travel and non-essential air travel.  Call your healthcare professional if you have concerns about COVID-19 and your underlying condition or if you are sick. Pt will take all meds as prescribed and schedule / keep doctors appt. CTC provided education on s/s that require medical attention and when to seek medical attention.   CTC advised Pt of use urgent care or physicians 24 hr access line if assistance is needed after hours or on the weekend. Pt denies any needs or concerns and is agreeable with additional calls.     Follow Up  Future Appointments   Date Time Provider Carina Dolan   4/16/2021  1:45 PM Anh Garzon MD [facility]   4/21/2021  1:00 PM Dinah Lowe AUDIO [facility]   4/21/2021  1:30 PM Cristóbal Serra, [address]  ENT [facility]       Holly Montes RN

## 2021-03-23 NOTE — PROGRESS NOTES
[address] [address] [address] [facility]  Dept: [phone]  Dept Fax: [phone]  Loc: [phone]    Visit Date: 3/23/2021    Frankie Mcnair is a 65 y.o. female who presents today for: Post-Op Check (Descending colon cancer)      Subjective:     Frankie Mcnair is a 65 y.o. female here for postoperative visit after lap assisted partial colectomy for descending colon mass. .    Overall recovering nicely. Still with some tightness and discomfort, as expected. Bowels working very well. Patient's problem list, medications, past medical, surgical, family, and social histories were reviewed and updated in the chart as indicated today. Objective:     BP (!) 152/72   Pulse 78   Temp 97.4 °F (36.3 °C) (Infrared)   Wt 219 lb (99.3 kg)   SpO2 94%   BMI 42.77 kg/m²     Abdominal/wound: Incisions healing well, no signs of infection    Assessment/Plan:       ASSESSMENT/PLAN:    About 3 weeks status post laparoscopic assisted partial colectomy. It was quite a difficult surgery due to her size and anatomy, but overall she is doing well. Pathology revealed no recurrent or residual tumor with the tattoo and biopsy cavity noted on final pathology, confirming complete resection. I did recommend repeat colonoscopy in 1 year with Dr. Tom Ochoa: Repeat colonoscopy in 1 year. Follow-up with me as needed    Note completed using dictation software, please excuse any errors. Referring/primary care physician updated through Qwiqq note if PCP was listed.     Electronically signed by Ellis Wei MD on 3/23/2021 at 11:55 AM

## 2021-03-26 ENCOUNTER — CARE COORDINATION (OUTPATIENT)
Dept: CASE MANAGEMENT | Age: 71
End: 2021-03-26

## 2021-03-26 NOTE — CARE COORDINATION
[facility] Transitions Initial Follow Up Call    3/26/21    Patient:  Alesia De Luna  Patient :  1950  MRN:  [id number]    Reason for Admission:  Colon resection   Discharge Date:  3/5/21   RARS: 22      CTC attempt to reach Pt regarding recent hospital discharge. CTC left voice recording with call back number requesting a call back. Follow up appointments:    Future Appointments   Date Time Provider Carina Dolan   2021  1:45 PM MD Karan Blanco [address] PC [facility]   2021  1:00 PM Dinah Younger [place] AUDIO [facility]   2021  1:30 PM Mimi Ugalde, [address] [facility] ENT [facility] Sep.  ADE Schreiber, RN  Care Transition Coordinator  Contact Number:  [phone]

## 2021-03-30 ENCOUNTER — CARE COORDINATION (OUTPATIENT)
Dept: CASE MANAGEMENT | Age: 71
End: 2021-03-30

## 2021-04-01 ENCOUNTER — TELEPHONE (OUTPATIENT)
Dept: SURGERY | Age: 71
End: 2021-04-01

## 2021-04-01 NOTE — TELEPHONE ENCOUNTER
Aaron Heller with [facility] needs the Certificate of Medical Necessity for oxygen completed, signed and faxed back in order to [employer]  She states the initial set-up of oxygen was with Dr Elida Reynoso forward they will reach out to patient's PCP.     Please call Aaron Heller with any questions:  [phone]

## 2021-04-02 ENCOUNTER — CARE COORDINATION (OUTPATIENT)
Dept: CASE MANAGEMENT | Age: 71
End: 2021-04-02

## 2021-04-02 NOTE — CARE COORDINATION
[facility] Transitions Initial Follow Up Call    Call within 2 business days of discharge: Yes    Patient:  Bea Ruiz Patient :  1950  MRN:  [id number]   Reason for Admission:  covid 19   Discharge Date:  3/5/21  RARS: 22      CTC attempt to reach Pt regarding recent hospital discharge. CTC left voice recording with call back number requesting a call back. Follow up appointments:    Future Appointments   Date Time Provider Carina Dolan   2021  1:45 PM Dagoberto Granda MD [facility] [place] [address] PC [facility]   2021  1:00 PM Dinah Chery AUDIO [facility]   2021  1:30 PM Linh Vann, [address] [facility]       Oneil Reynoso.  ADE Velasquez, RN  Care Transition Coordinator  Contact Number:  [phone]

## 2021-04-05 ENCOUNTER — TELEPHONE (OUTPATIENT)
Dept: PRIMARY CARE CLINIC | Age: 71
End: 2021-04-05

## 2021-04-05 DIAGNOSIS — J45.40 MODERATE PERSISTENT ASTHMA, UNSPECIFIED WHETHER COMPLICATED: ICD-10-CM

## 2021-04-06 ENCOUNTER — CARE COORDINATION (OUTPATIENT)
Dept: CASE MANAGEMENT | Age: 71
End: 2021-04-06

## 2021-04-06 DIAGNOSIS — J45.40 MODERATE PERSISTENT ASTHMA, UNSPECIFIED WHETHER COMPLICATED: ICD-10-CM

## 2021-04-06 RX ORDER — MONTELUKAST SODIUM 10 MG/1
TABLET ORAL
Qty: 30 TABLET | Refills: 5 | Status: CANCELLED | OUTPATIENT
Start: 2021-04-06

## 2021-04-06 RX ORDER — MONTELUKAST SODIUM 10 MG/1
TABLET ORAL
Qty: 30 TABLET | Refills: 12 | Status: SHIPPED | OUTPATIENT
Start: 2021-04-06 | End: 2022-04-28

## 2021-04-15 LAB
CREATININE URINE: 69 MG/DL (ref 28–259)
MICROALBUMIN UR-MCNC: 5.3 MG/DL
MICROALBUMIN/CREAT UR-RTO: 76.8 MG/G (ref 0–30)

## 2021-04-16 ENCOUNTER — OFFICE VISIT (OUTPATIENT)
Dept: PRIMARY CARE CLINIC | Age: 71
End: 2021-04-16
Payer: MEDICARE

## 2021-04-16 VITALS
SYSTOLIC BLOOD PRESSURE: 135 MMHG | BODY MASS INDEX: 41.6 KG/M2 | OXYGEN SATURATION: 98 % | TEMPERATURE: 97.2 F | HEART RATE: 77 BPM | DIASTOLIC BLOOD PRESSURE: 75 MMHG | WEIGHT: 213 LBS

## 2021-04-16 DIAGNOSIS — T78.40XA ALLERGIC REACTION TO DRUG, INITIAL ENCOUNTER: ICD-10-CM

## 2021-04-16 DIAGNOSIS — H60.13 CELLULITIS OF BOTH EARS: Primary | ICD-10-CM

## 2021-04-16 PROCEDURE — 1090F PRES/ABSN URINE INCON ASSESS: CPT | Performed by: FAMILY MEDICINE

## 2021-04-16 PROCEDURE — 4040F PNEUMOC VAC/ADMIN/RCVD: CPT | Performed by: FAMILY MEDICINE

## 2021-04-16 PROCEDURE — 1036F TOBACCO NON-USER: CPT | Performed by: FAMILY MEDICINE

## 2021-04-16 PROCEDURE — 99214 OFFICE O/P EST MOD 30 MIN: CPT | Performed by: FAMILY MEDICINE

## 2021-04-16 PROCEDURE — G8427 DOCREV CUR MEDS BY ELIG CLIN: HCPCS | Performed by: FAMILY MEDICINE

## 2021-04-16 PROCEDURE — 4130F TOPICAL PREP RX AOE: CPT | Performed by: FAMILY MEDICINE

## 2021-04-16 PROCEDURE — G8417 CALC BMI ABV UP PARAM F/U: HCPCS | Performed by: FAMILY MEDICINE

## 2021-04-16 PROCEDURE — 1123F ACP DISCUSS/DSCN MKR DOCD: CPT | Performed by: FAMILY MEDICINE

## 2021-04-16 PROCEDURE — G8399 PT W/DXA RESULTS DOCUMENT: HCPCS | Performed by: FAMILY MEDICINE

## 2021-04-16 PROCEDURE — 3017F COLORECTAL CA SCREEN DOC REV: CPT | Performed by: FAMILY MEDICINE

## 2021-04-16 RX ORDER — AZITHROMYCIN 250 MG/1
TABLET, FILM COATED ORAL
Qty: 1 PACKET | Refills: 1 | Status: SHIPPED | OUTPATIENT
Start: 2021-04-16

## 2021-04-16 NOTE — PROGRESS NOTES
66 y/o female P[facility] HTN, DM-2, HLD previous robitc  Assisted lap left colectomy with colocolonic anastomosis per Dr Lacie Thompson    She is here today for bilateral cellulitis of the lower earlobes area  After removing earrings helder studs 1 1/2 months ago. Seen in  Urgent care about 3 wks ago treated with doxycycline and mupirocin after  Developing a rash of ears(topical) and generalized  Stopped both. Itching stopped        Physical Exam  Constitutional:       General: She is not in acute distress. Appearance: She is well-developed. She is not diaphoretic. HENT:      Head: Normocephalic and atraumatic. Right Ear: External ear normal.      Left Ear: External ear normal.      Nose: Nose normal.      Mouth/Throat:      Pharynx: No oropharyngeal exudate. Eyes:      General: No scleral icterus. Left eye: No discharge. Conjunctiva/sclera: Conjunctivae normal.      Pupils: Pupils are equal, round, and reactive to light. Neck:      Musculoskeletal: Normal range of motion and neck supple. Thyroid: No thyromegaly. Vascular: No JVD. Trachea: No tracheal deviation. Cardiovascular:      Rate and Rhythm: Normal rate and regular rhythm. Heart sounds: Normal heart sounds. No murmur. No friction rub. No gallop. Pulmonary:      Effort: Pulmonary effort is normal. No respiratory distress. Breath sounds: Normal breath sounds. No stridor. No wheezing or rales. Chest:      Chest wall: No tenderness. Abdominal:      General: Bowel sounds are normal. There is no distension. Palpations: Abdomen is soft. There is no mass. Tenderness: There is no abdominal tenderness. There is no guarding or rebound. Musculoskeletal: Normal range of motion. General: No tenderness. Lymphadenopathy:      Cervical: No cervical adenopathy. Skin:     General: Skin is warm and dry. Coloration: Skin is not pale. Findings: No erythema or rash.       Comments: Cellulitis both ears lower lobes area at puncture site    Of studs now removed   Neurological:      Mental Status: She is alert and oriented to person, place, and time. Cranial Nerves: No cranial nerve deficit. Coordination: Coordination normal.      Deep Tendon Reflexes: Reflexes are normal and symmetric. Reflexes normal.   Psychiatric:         Behavior: Behavior normal.         Thought Content: Thought content normal.         Judgment: Judgment normal.       1. Cellulitis of both ears  Warm moist compresses  Avoid earrings for now  Start antibiotics  - azithromycin (ZITHROMAX) 250 MG tablet; Take 2 tabs (500 mg) on Day 1, and take 1 tab (250 mg) on days 2 through 5. Dispense: 1 packet; Refill: 1    2.  Allergic reaction to drug, initial encounter  Improving now  Up date allergy list of meds

## 2021-04-21 DIAGNOSIS — H91.90 HEARING LOSS, UNSPECIFIED HEARING LOSS TYPE, UNSPECIFIED LATERALITY: Primary | ICD-10-CM

## 2021-05-05 ENCOUNTER — OFFICE VISIT (OUTPATIENT)
Dept: PRIMARY CARE CLINIC | Age: 71
End: 2021-05-05
Payer: COMMERCIAL

## 2021-05-05 VITALS
OXYGEN SATURATION: 100 % | WEIGHT: 212 LBS | BODY MASS INDEX: 41.4 KG/M2 | DIASTOLIC BLOOD PRESSURE: 77 MMHG | TEMPERATURE: 97.2 F | HEART RATE: 71 BPM | SYSTOLIC BLOOD PRESSURE: 137 MMHG

## 2021-05-05 DIAGNOSIS — L29.9 ITCHING OF EAR: Primary | ICD-10-CM

## 2021-05-05 DIAGNOSIS — H60.13: ICD-10-CM

## 2021-05-05 DIAGNOSIS — Z23 NEED FOR SHINGLES VACCINE: ICD-10-CM

## 2021-05-05 PROCEDURE — 3017F COLORECTAL CA SCREEN DOC REV: CPT | Performed by: FAMILY MEDICINE

## 2021-05-05 PROCEDURE — 1036F TOBACCO NON-USER: CPT | Performed by: FAMILY MEDICINE

## 2021-05-05 PROCEDURE — G8417 CALC BMI ABV UP PARAM F/U: HCPCS | Performed by: FAMILY MEDICINE

## 2021-05-05 PROCEDURE — 1123F ACP DISCUSS/DSCN MKR DOCD: CPT | Performed by: FAMILY MEDICINE

## 2021-05-05 PROCEDURE — G8427 DOCREV CUR MEDS BY ELIG CLIN: HCPCS | Performed by: FAMILY MEDICINE

## 2021-05-05 PROCEDURE — 4130F TOPICAL PREP RX AOE: CPT | Performed by: FAMILY MEDICINE

## 2021-05-05 PROCEDURE — 4040F PNEUMOC VAC/ADMIN/RCVD: CPT | Performed by: FAMILY MEDICINE

## 2021-05-05 PROCEDURE — 1090F PRES/ABSN URINE INCON ASSESS: CPT | Performed by: FAMILY MEDICINE

## 2021-05-05 PROCEDURE — G8399 PT W/DXA RESULTS DOCUMENT: HCPCS | Performed by: FAMILY MEDICINE

## 2021-05-05 PROCEDURE — 99213 OFFICE O/P EST LOW 20 MIN: CPT | Performed by: FAMILY MEDICINE

## 2021-05-05 RX ORDER — ZOSTER VACCINE RECOMBINANT, ADJUVANTED 50 MCG/0.5
0.5 KIT INTRAMUSCULAR ONCE
Qty: 0.5 ML | Refills: 0 | Status: SHIPPED | OUTPATIENT
Start: 2021-05-05 | End: 2021-05-05

## 2021-05-05 RX ORDER — TRIAMCINOLONE ACETONIDE 1 MG/G
CREAM TOPICAL
Qty: 15 G | Refills: 0 | Status: SHIPPED | OUTPATIENT
Start: 2021-05-05

## 2021-05-05 NOTE — PROGRESS NOTES
She is s/p partial colectomy for colon ca    She is fu visit recent infection both ears  She has completed antibiotics at this time    Now itchy rash of ears wants something for this        Exam  Both ear lobes, tms, canals inspected  Infection resolved  Sl scaly rash of ear lobes    1. Itching of ear  Likely non specific vs allergy vs atopic  add  - triamcinolone (KENALOG) 0.1 % cream; Apply topically 2 times daily. affected ear  Dispense: 15 g; Refill: 0    2. Cellulitis of antihelix of both ears  resolved    3. Need for shingles vaccine    - zoster recombinant adjuvanted vaccine [facility]) 50 MCG/0.5ML SUSR injection;  Inject 0.5 mLs into the muscle once for 1 dose 2 doses  4-6 months  Dispense: 0.5 mL; Refill: 0

## 2021-05-07 NOTE — PROGRESS NOTES
Karen Sidhu MD Cyclophosphamide Counseling:  I discussed with the patient the risks of cyclophosphamide including but not limited to hair loss, hormonal abnormalities, decreased fertility, abdominal pain, diarrhea, nausea and vomiting, bone marrow suppression and infection. The patient understands that monitoring is required while taking this medication.

## 2021-05-10 DIAGNOSIS — I10 ESSENTIAL HYPERTENSION: ICD-10-CM

## 2021-05-11 NOTE — TELEPHONE ENCOUNTER
Medication:   Requested Prescriptions     Signed Prescriptions Disp Refills    metoprolol tartrate (LOPRESSOR) 25 MG tablet 60 tablet 6     Sig: TAKE 1 TABLET (25MG) BY MOUTH 2 TIMES A DAY (BLOOD PRESSURE)     Authorizing Provider: Rosalie Wright    sertraline (ZOLOFT) 50 MG tablet 120 tablet 6     Sig: TAKE 2 TABLETS (100MG) BY MOUTH 2 TIMES A DAY (MOOD)     Authorizing Provider: Rosalie Wright        Last Filled:      Patient Phone Number: [phone] (home) [phone] (work)    Last appt: 5/5/2021   Next appt: 8/13/2021    Last OARRS: No flowsheet data found.

## 2021-05-18 NOTE — PROGRESS NOTES
(%), based on NU-6 25-word list at 55 dBHL using recorded speech stimuli. Tympanometry: Normal peak pressure and compliance, Type A tympanogram, consistent with normal middle ear function. Acoustic Reflexes: Ipsilateral: Did not test. Contralateral: Did not test.    Reliability: Good  Transducer: HF Phones    See scanned audiogram dated 5/19/2021  for results. PATIENT EDUCATION:       The following items were discussed with the patient:    - Good Communication Strategies  - Hearing Loss and Hearing Aids  - Tinnitus Management Strategies  - Fall Risk and Prevention   - Dizziness    Educational information was shared in the After Visit Summary. RECOMMENDATIONS:                                                                                                                                                                                                                                                            The following items are recommended based on patient report and results from today's appointment:   - Continue medical follow-up with Rober Zuñiga MD.   - Retest hearing as medically indicated and/or sooner if a change in hearing is noted. - If desired, schedule a Hearing Aid Evaluation (HAE) appointment to discuss hearing aid options   - Utilize \"Good Communication Strategies\" as discussed to assist in speech understanding with communication partners  - Maintain a sound enriched environment to assist in the management of tinnitus symptoms. - If medically indicated, consider vestibular evaluation to further investigate symptoms of dizziness.        Dinah Del Rio  Audiologist    Chart CC'd to: Rober Zuñiga MD      Degree of   Hearing Sensitivity dB Range   Within Normal Limits (WNL) 0 - 20   Mild 20 - 40   Moderate 40 - 55   Moderately-Severe 55 - 70   Severe 70 - 90   Profound 90 +

## 2021-05-19 ENCOUNTER — OFFICE VISIT (OUTPATIENT)
Dept: ENT CLINIC | Age: 71
End: 2021-05-19
Payer: COMMERCIAL

## 2021-05-19 ENCOUNTER — PROCEDURE VISIT (OUTPATIENT)
Dept: AUDIOLOGY | Age: 71
End: 2021-05-19
Payer: COMMERCIAL

## 2021-05-19 VITALS
BODY MASS INDEX: 39.85 KG/M2 | DIASTOLIC BLOOD PRESSURE: 72 MMHG | SYSTOLIC BLOOD PRESSURE: 165 MMHG | WEIGHT: 203 LBS | HEIGHT: 60 IN | HEART RATE: 79 BPM

## 2021-05-19 DIAGNOSIS — H90.3 SENSORINEURAL HEARING LOSS (SNHL) OF BOTH EARS: ICD-10-CM

## 2021-05-19 DIAGNOSIS — H93.13 TINNITUS OF BOTH EARS: ICD-10-CM

## 2021-05-19 DIAGNOSIS — R42 DIZZINESS: Primary | ICD-10-CM

## 2021-05-19 DIAGNOSIS — H61.21 IMPACTED CERUMEN OF RIGHT EAR: ICD-10-CM

## 2021-05-19 DIAGNOSIS — H91.90 HEARING LOSS, UNSPECIFIED HEARING LOSS TYPE, UNSPECIFIED LATERALITY: Primary | ICD-10-CM

## 2021-05-19 PROCEDURE — G8417 CALC BMI ABV UP PARAM F/U: HCPCS | Performed by: OTOLARYNGOLOGY

## 2021-05-19 PROCEDURE — 99204 OFFICE O/P NEW MOD 45 MIN: CPT | Performed by: OTOLARYNGOLOGY

## 2021-05-19 PROCEDURE — 1090F PRES/ABSN URINE INCON ASSESS: CPT | Performed by: OTOLARYNGOLOGY

## 2021-05-19 PROCEDURE — 4040F PNEUMOC VAC/ADMIN/RCVD: CPT | Performed by: OTOLARYNGOLOGY

## 2021-05-19 PROCEDURE — 92557 COMPREHENSIVE HEARING TEST: CPT | Performed by: AUDIOLOGIST

## 2021-05-19 PROCEDURE — 1036F TOBACCO NON-USER: CPT | Performed by: OTOLARYNGOLOGY

## 2021-05-19 PROCEDURE — 1123F ACP DISCUSS/DSCN MKR DOCD: CPT | Performed by: OTOLARYNGOLOGY

## 2021-05-19 PROCEDURE — G8427 DOCREV CUR MEDS BY ELIG CLIN: HCPCS | Performed by: OTOLARYNGOLOGY

## 2021-05-19 PROCEDURE — 69210 REMOVE IMPACTED EAR WAX UNI: CPT | Performed by: OTOLARYNGOLOGY

## 2021-05-19 PROCEDURE — 3017F COLORECTAL CA SCREEN DOC REV: CPT | Performed by: OTOLARYNGOLOGY

## 2021-05-19 PROCEDURE — 92567 TYMPANOMETRY: CPT | Performed by: AUDIOLOGIST

## 2021-05-19 PROCEDURE — G8399 PT W/DXA RESULTS DOCUMENT: HCPCS | Performed by: OTOLARYNGOLOGY

## 2021-05-19 NOTE — PROGRESS NOTES
[facility]      Patient Name: [address] [facility] Record Number:  [id number]  Primary Care Physician:  Celi Valderrama MD  Date of Consultation: 5/19/2021    Chief Complaint: Dizziness        HISTORY OF PRESENT ILLNESS  Zulay Whyte is a(n) 65 y.o. female who presents for evaluation of dizziness. The patient says she has been having intermittent dizzy episodes since she had surgery in February for a colorectal lesion. She says that it is mostly lightheaded. Sometimes it happens randomly, other times when she bends over. When prompted she does say sometimes she has spinning associated with this. She does not think she has had this before. She denies any changes in her vision. She denies headaches. She denies changes in her hearing during these episodes. She does not have a significant ear history. Never had ear surgery. No recent head trauma. REVIEW OF SYSTEMS  As above    PHYSICAL EXAM  GENERAL: No Acute Distress, Alert and Oriented, no Hoarseness, strong voice  EYES: EOMI, Anti-icteric  HENT:   Head: Normocephalic and atraumatic. Face:  Symmetric, facial nerve intact, no sinus tenderness   ears: See below  Mouth/Oral Cavity:  normal lips, Uvula is midline, no mucosal lesions, no trismus  Oropharynx/Larynx:  normal oropharynx  Nose:Normal external nasal appearance. Anterior rhinoscopy shows a normal septum. Normal turbinates. Normal mucosa   NECK: Normal range of motion, no thyromegaly, trachea is midline, no lymphadenopathy, no neck masses, no crepitus  CHEST: Normal respiratory effort, no retractions, breathing comfortably  SKIN: No rashes, normal appearing skin, no evidence of skin lesions/tumors  Neuro:  cranial nerve II-XII intact; normal gait, negative Ziggy-Hallpike  Cardio:  no edema    Patient had an audiogram today that shows normal hearing except from the high-frequency test that she has a moderate hearing loss. Symmetric, top and tympanograms    PROCEDURE  Bilateral ear exam with cerumen removal    Right ear is visualized binocular scope. There was a dense cerumen impaction that I removed with an alligator forcep. Underlying tympanic membrane intact and aerated middle ear    On the left side the patient had a little bit of wax I removed with alligator forcep. Underlying tympanic membrane intact and aerated middle ear        ASSESSMENT/PLAN  1. Dizziness  Not entirely sure was causing this patient's dizziness. Some of her issues do sound like it could be BPPV, but her [place]-Hallpike was negative. I think more likely she has overall decompensation from her surgery. She has a lot of swelling of her feet and likely has at least some degree of neuropathy from this. I think that her best chance at improving the dizziness is to see physical therapy for vestibular rehab. I think that this is the most likely to give her some improvement in her balance and dizzy symptoms. They can also do a more thorough evaluation for BPPV, but at this time I do not think that Epley maneuvers will help. I would like to see her after she sees physical therapy  - PT vestibular rehab; Future    2. Sensorineural hearing loss (SNHL) of both ears  Patient does have some hearing loss this likely secondary to presbycusis. Recommend follow-up hearing test in 1 to 2 years  - PT vestibular rehab; Future    3. Impacted cerumen of right ear  Removed in clinic             I have performed a head and neck physical exam personally or was physically present during the key or critical portions of the service. This note was generated completely or in part utilizing Dragon dictation speech recognition software. Occasionally, words are mistranscribed and despite editing, the text may contain inaccuracies due to incorrect word recognition. If further clarification is needed please contact the office at [phone].

## 2021-05-19 NOTE — PATIENT INSTRUCTIONS
a \"Hearing Aid Evaluation\" with an audiologist to discuss your lifestyle, features of hearing aid technology, and styles of hearing aids available. It is recommended that you contact your insurance company to determine if you have a hearing aid benefit, as this may dictate who you can see for these services. · Have hearing tests as your doctor suggests. They can show whether your hearing has changed. Your hearing aid may need to be adjusted. · Use other assistive devices as needed. These may include:  ? Telephone amplifiers and hearing aids that can connect to a television, stereo, radio, or microphone. ? Devices that use lights or vibrations. These alert you to the doorbell, a ringing telephone, or a baby monitor. ? Television closed-captioning. This shows the words at the bottom of the screen. Most new TVs can do this. ? TTY (text telephone). This lets you type messages back and forth on the telephone instead of talking or listening. These devices are also called TDD. When messages are typed on the keyboard, they are sent over the phone line to a receiving TTY. The message is shown on a monitor. · Use pagers, fax machines, text, and email if it is hard for you to communicate by telephone. · Try to learn a listening technique called speech-reading. It is not lip-reading. You pay attention to people's gestures, expressions, posture, and tone of voice. These clues can help you understand what a person is saying. Face the person you are talking to, and have him or her face you. Make sure the lighting is good. You need to see the other person's face clearly. · Think about counseling if you need help to adjust to your hearing loss. When should you call for help? Watch closely for changes in your health, and be sure to contact your doctor if:    · You think your hearing is getting worse. · You have new symptoms, such as dizziness or nausea.            Tinnitus: Overview and Management Strategies          Many people have some ringing sounds in their ears once in a while. You may hear a roar, a hiss, a tinkle, or a buzz. The sound usually lasts only a few minutes. If it goes on all the time, you may have tinnitus. Tinnitus is usually caused by long-term exposure to loud noise. This damages the nerves in the inner ear. It can occur with all types of hearing loss. It may be a symptom of almost any ear problem. Tinnitus may be caused by a buildup of earwax. Or, it may be caused by ear infections or certain medicines (especially antibiotics or large amounts of aspirin). You can also hear noises in your ears because of an injury to the ears, drinking too much alcohol or caffeine, or a medical condition. Other conditions may also contribute to tinnitus, including: head and neck trauma, temporomandibular joint disorder (TMJ), sinus pressure and barometric trauma, traumatic brain injury, metabolic disorders, autoimmune disorders, stress, and high blood pressure. You may need tests to evaluate your hearing and to find causes of long-lasting tinnitus. Your doctor may suggest one or more treatments to help you cope with the tinnitus. You can also do things at home to help reduce symptoms. Causes of Tinnitus  We do not know the exact cause of tinnitus. One thing we do know is that you are not imagining it. If you have tinnitus, chances are the cause will remain a mystery. Conditions that might cause tinnitus include the following:    Hearing loss    Ménière's disease    Loud noise exposure    Migraines    Head injury    Drugs or medicines that are toxic to hearing    Anemia    High blood pressure    Stress    A lot of wax in the ear    Certain types of tumors    Having a lot of caffeine    Smoking cigarettes  You may find that your tinnitus is worse at night. This happens because it is quiet and you are not distracted.  Feeling tired and stressed may make your tinnitus worse.    Follow-up care is a key part of your treatment and safety. Be sure to make and go to all appointments, and call your doctor if you are having problems. It's also a good idea to know your test results and keep a list of the medicines you take. How can you care for yourself at home? · Limit or cut out alcohol, caffeine, and sodium. They can make your symptoms worse. · Do not smoke or use other tobacco products. Nicotine reduces blood flow to the ear and makes tinnitus worse. If you need help quitting, talk to your doctor about stop-smoking programs and medicines. These can increase your chances of quitting for good. · Talk to your doctor about whether to stop taking aspirin and similar products such as ibuprofen or naproxen. · Get exercise often. It can help improve blood flow to the ear. Hearing Aids and Other Devices  A hearing aid may help your tinnitus if you have a hearing loss. An audiologist can help you find and use the best hearing aid for you. Tinnitus maskers look like hearing aids. They make a sound that masks, or covers up, the tinnitus. The masking sound distracts you from the ringing in your ears. You may be able to use a masker and a hearing aid at the same time. Sound machines can be useful at night or during quiet times. There are machines you can buy at the store. Or, you can find apps on your phone that make sounds, like the ocean or rainfall. Fish tanks, fans, quiet music, and indoor waterfalls can help, as well. Ways to manage/cope with tinnitus  Some tinnitus may last a long time. To manage your tinnitus, try to:  · Avoid noises that you think caused your tinnitus. If you can't avoid loud noises, wear earplugs or earmuffs. · Ignore the sound by paying attention to other things. Keeping your brain busy with other tasks or background noise can help your brain not focus on the tinnitus. · Try to not give the tinnitus an emotional reaction.   Do your best to ignore the sound and not let it bother you. Relax using biofeedback, meditation, or yoga. Feeling stressed and being tired can make tinnitus worse. · Play music or white noise to help you sleep. Background noise may cover up the noise that you hear in your ears. You can buy a tabletop machine or a device that sits under your pillow to play soothing sounds, like ocean waves. · Smart phones have free apps, such as Whist, Relax Melodies, ReSound Relief, and White Noise Lite. These apps have different types of sounds/noise, some of which you can blend together to find sounds that are most soothing to you. · Hearing aid technology, especially when there is some hearing loss, may help reduce tinnitus symptoms by giving your brain better access to the sounds it is missing. There are some hearing aids with built-in noise generator programs, which may help when amplification alone is not enough. Additional resources may be found through the [employer]inn[employer] at www.césar.org    When should you call for help? Call 911 anytime you think you may need emergency care. For example, call if:    · You have symptoms of a stroke. These may include:  ? Sudden numbness, tingling, weakness, or loss of movement in your face, arm, or leg, especially on only one side of your body. ? Sudden vision changes. ? Sudden trouble speaking. ? Sudden confusion or trouble understanding simple statements. ? Sudden problems with walking or balance. ? A sudden, severe headache that is different from past headaches. Call your doctor now or seek immediate medical care if:    · You develop other symptoms. These may include hearing loss (or worse hearing loss), balance problems, dizziness, nausea, or vomiting. Watch closely for changes in your health, and be sure to contact your doctor if:    · Your tinnitus moves from both ears to one ear. · Your hearing loss gets worse within 1 day after an ear injury.      · Your tinnitus or falls. Steps you can take include:  ? Using nonskid mats, adding grab bars near the tub, and using night-lights. ? Clearing your home so that walkways are free of anything you might trip on.  ? Letting family and friends know that you have been feeling dizzy. This will help them know how to help you. When should you call for help? Call 911 anytime you think you may need emergency care. For example, call if:    · You passed out (lost consciousness). · You have dizziness along with symptoms of a heart attack. These may include:  ? Chest pain or pressure, or a strange feeling in the chest.  ? Sweating. ? Shortness of breath. ? Nausea or vomiting. ? Pain, pressure, or a strange feeling in the back, neck, jaw, or upper belly or in one or both shoulders or arms. ? Lightheadedness or sudden weakness. ? A fast or irregular heartbeat. · You have symptoms of a stroke. These may include:  ? Sudden numbness, tingling, weakness, or loss of movement in your face, arm, or leg, especially on only one side of your body. ? Sudden vision changes. ? Sudden trouble speaking. ? Sudden confusion or trouble understanding simple statements. ? Sudden problems with walking or balance. ? A sudden, severe headache that is different from past headaches. Call your doctor now or seek immediate medical care if:    · You feel dizzy and have a fever, headache, or ringing in your ears. · You have new or increased nausea and vomiting. · Your dizziness does not go away or comes back. Watch closely for changes in your health, and be sure to contact your doctor if:    · You do not get better as expected. Where can you learn more? Go to https://LiveIntentkirk.[employer]. org and sign in to your [employer] account. Enter X214 in the [employer] box to learn more about \"Dizziness: Care Instructions. \"     If you do not have an account, please click on the \"Sign Up Now\" link.   Current as of: September 23, 2018  Content Version: 11.9  © 3688-7555 [employer]. Care instructions adapted under license by [facility] ([facility]). If you have questions about a medical condition or this instruction, always ask your healthcare professional. [address] any warranty or liability for your use of this information. Patient Education        Preventing Falls: Care Instructions  Your Care Instructions     Getting around your home safely can be a challenge if you have injuries or health problems that make it easy for you to fall. Loose rugs and furniture in walkways are among the dangers for many older people who have problems walking or who have poor eyesight. People who have conditions such as arthritis, osteoporosis, or dementia also have to be careful not to fall. You can make your home safer with a few simple measures. Follow-up care is a key part of your treatment and safety. Be sure to make and go to all appointments, and call your doctor if you are having problems. It's also a good idea to know your test results and keep a list of the medicines you take. How can you care for yourself at home? Taking care of yourself  · You may get dizzy if you do not drink enough water. To prevent dehydration, drink plenty of fluids. Choose water and other caffeine-free clear liquids. If you have kidney, heart, or liver disease and have to limit fluids, talk with your doctor before you increase the amount of fluids you drink. · Exercise regularly to improve your strength, muscle tone, and balance. Walk if you can. Swimming may be a good choice if you cannot walk easily. · Have your vision and hearing checked each year or any time you notice a change. If you have trouble seeing and hearing, you might not be able to avoid objects and could lose your balance. · Know the side effects of the medicines you take. Ask your doctor or pharmacist whether the medicines you take can affect your balance.  Sleeping and ice in the winter, sprinkle salt on slippery steps and sidewalks. Preventing falls in the bath  · Install grab bars and nonskid mats inside and outside your shower or tub and near the toilet and sinks. · Use shower chairs and bath benches. · Use a hand-held shower head that will allow you to sit while showering. · Get into a tub or shower by putting the weaker leg in first. Get out of a tub or shower with your strong side first.  · Repair loose toilet seats and consider installing a raised toilet seat to make getting on and off the toilet easier. · Keep your bathroom door unlocked while you are in the shower. Where can you learn more? Go to https://MirageWorkspeNeurotrack.Walk-[email or web address] org and sign in to your [employer] account. Enter [id number] [id number] [phone] in the [facility] box to learn more about \"Preventing Falls: Care Instructions. \"     If you do not have an account, please click on the \"Sign Up Now\" link. Current as of: December 7, 2020               Content Version: 12.8  © 8500-7952 [employer]. Care instructions adapted under license by [facility] ([facility]). If you have questions about a medical condition or this instruction, always ask your healthcare professional. [address] any warranty or liability for your use of this information.

## 2021-06-02 ENCOUNTER — CLINICAL DOCUMENTATION (OUTPATIENT)
Dept: OTHER | Age: 71
End: 2021-06-02

## 2021-08-09 RX ORDER — LORATADINE 10 MG/1
TABLET ORAL
Qty: 30 TABLET | Refills: 3 | Status: SHIPPED | OUTPATIENT
Start: 2021-08-09

## 2021-08-18 ENCOUNTER — HOSPITAL ENCOUNTER (OUTPATIENT)
Dept: GENERAL RADIOLOGY | Age: 71
Discharge: HOME | End: 2021-08-18
Payer: COMMERCIAL

## 2021-08-18 ENCOUNTER — HOSPITAL ENCOUNTER (OUTPATIENT)
Age: 71
Discharge: HOME | End: 2021-08-18
Payer: COMMERCIAL

## 2021-08-18 DIAGNOSIS — M54.50 ACUTE LOW BACK PAIN, UNSPECIFIED BACK PAIN LATERALITY, UNSPECIFIED WHETHER SCIATICA PRESENT: ICD-10-CM

## 2021-08-18 PROCEDURE — 72100 X-RAY EXAM L-S SPINE 2/3 VWS: CPT

## 2021-11-15 ENCOUNTER — CLINICAL DOCUMENTATION (OUTPATIENT)
Dept: OTHER | Age: 71
End: 2021-11-15

## 2022-01-06 ENCOUNTER — TELEPHONE (OUTPATIENT)
Dept: ORTHOPEDIC SURGERY | Age: 72
End: 2022-01-06

## 2022-01-18 ENCOUNTER — TELEPHONE (OUTPATIENT)
Dept: ORTHOPEDIC SURGERY | Age: 72
End: 2022-01-18

## 2022-01-18 NOTE — TELEPHONE ENCOUNTER
Attempted to contact patient to inform them about the [address]  knee joint pain program. Patient can call [phone] to find out more information or to schedule an appointment with a joint pain orthopedic specialist.

## 2022-02-20 ENCOUNTER — HOSPITAL ENCOUNTER (EMERGENCY)
Age: 72
Discharge: HOME | End: 2022-02-20
Attending: EMERGENCY MEDICINE
Payer: COMMERCIAL

## 2022-02-20 ENCOUNTER — APPOINTMENT (OUTPATIENT)
Dept: GENERAL RADIOLOGY | Age: 72
End: 2022-02-20
Payer: COMMERCIAL

## 2022-02-20 VITALS
SYSTOLIC BLOOD PRESSURE: 188 MMHG | OXYGEN SATURATION: 95 % | RESPIRATION RATE: 14 BRPM | TEMPERATURE: 98.3 F | HEART RATE: 79 BPM | DIASTOLIC BLOOD PRESSURE: 82 MMHG

## 2022-02-20 DIAGNOSIS — M54.31 SCIATICA OF RIGHT SIDE: Primary | ICD-10-CM

## 2022-02-20 PROCEDURE — 73502 X-RAY EXAM HIP UNI 2-3 VIEWS: CPT

## 2022-02-20 PROCEDURE — 99282 EMERGENCY DEPT VISIT SF MDM: CPT

## 2022-02-20 PROCEDURE — 96372 THER/PROPH/DIAG INJ SC/IM: CPT

## 2022-02-20 PROCEDURE — 6360000002 HC RX W HCPCS

## 2022-02-20 RX ORDER — ORPHENADRINE CITRATE 30 MG/ML
60 INJECTION INTRAMUSCULAR; INTRAVENOUS ONCE
Status: COMPLETED | OUTPATIENT
Start: 2022-02-20 | End: 2022-02-20

## 2022-02-20 RX ORDER — METHYLPREDNISOLONE 4 MG/1
TABLET ORAL
Qty: 1 KIT | Refills: 0 | Status: SHIPPED | OUTPATIENT
Start: 2022-02-20 | End: 2022-02-26

## 2022-02-20 RX ORDER — CYCLOBENZAPRINE HCL 10 MG
10 TABLET ORAL 3 TIMES DAILY PRN
Qty: 21 TABLET | Refills: 0 | Status: SHIPPED | OUTPATIENT
Start: 2022-02-20 | End: 2022-03-02

## 2022-02-20 RX ORDER — LIDOCAINE 50 MG/G
1 PATCH TOPICAL DAILY
Qty: 10 PATCH | Refills: 0 | Status: SHIPPED | OUTPATIENT
Start: 2022-02-20 | End: 2022-03-02

## 2022-02-20 RX ADMIN — ORPHENADRINE CITRATE 60 MG: 30 INJECTION INTRAMUSCULAR; INTRAVENOUS at 16:29

## 2022-02-20 ASSESSMENT — ENCOUNTER SYMPTOMS
VOMITING: 0
SHORTNESS OF BREATH: 0
NAUSEA: 0
CONSTIPATION: 0
SORE THROAT: 0
EYE PAIN: 0
ABDOMINAL PAIN: 0
BACK PAIN: 0
COUGH: 0
DIARRHEA: 0
RHINORRHEA: 0

## 2022-02-20 ASSESSMENT — PAIN SCALES - WONG BAKER: WONGBAKER_NUMERICALRESPONSE: 8

## 2022-02-20 ASSESSMENT — PAIN DESCRIPTION - PAIN TYPE: TYPE: CHRONIC PAIN

## 2022-02-20 ASSESSMENT — PAIN DESCRIPTION - LOCATION: LOCATION: HIP

## 2022-02-20 ASSESSMENT — PAIN - FUNCTIONAL ASSESSMENT: PAIN_FUNCTIONAL_ASSESSMENT: 0-10

## 2022-02-20 ASSESSMENT — PAIN SCALES - GENERAL: PAINLEVEL_OUTOF10: 8

## 2022-02-20 ASSESSMENT — PAIN DESCRIPTION - DESCRIPTORS: DESCRIPTORS: ACHING;CONSTANT

## 2022-02-20 NOTE — ED PROVIDER NOTES
[address] [facility]        Pt Name: Carlos Corral  MRN: [id number]  [place] 1950  Date of evaluation: 2/20/2022  Provider: DAVID Hamilton - CNP  PCP: Elly Abdalla MD  Note Started: 3:48 PM EST       I have seen and evaluated this patient with my supervising physician Dr. Esha Zavala       Chief Complaint   Patient presents with    Hip Pain     right, nki         HISTORY OF PRESENT ILLNESS   (Location/Symptom, Timing/Onset, Context/Setting, Quality, Duration, Modifying Factors, Severity)  Note limiting factors. Chief Complaint: My right hip hurts    Carlos Corral is a 66 y.o. female who presents to the ED with 3 weeks of right hip pain radiating down the right side from her buttocks to her towards her knee. Pain is intermittent, some numbness and tingling occasionally. No accidents or injuries. Patient denies any saddle anesthesia, incontinence, drug use, back injury or back surgery. She has tried some muscle rub at home but has not had good symptom relief. Pain is sharp. Pain is worse with movement or worse with laying on side    Nursing Notes were all reviewed and agreed with or any disagreements were addressed in the HPI. REVIEW OF SYSTEMS    (2-9 systems for level 4, 10 or more for level 5)     Review of Systems   Constitutional: Negative for chills, diaphoresis and fever. HENT: Negative for congestion, rhinorrhea and sore throat. Eyes: Negative for pain and visual disturbance. Respiratory: Negative for cough and shortness of breath. Cardiovascular: Negative for chest pain and leg swelling. Gastrointestinal: Negative for abdominal pain, constipation, diarrhea, nausea and vomiting. Genitourinary: Negative for frequency and hematuria. Musculoskeletal: Positive for arthralgias and myalgias. Negative for back pain and neck pain.         Hip pain as in HPI   Skin: Negative for rash and wound. Neurological: Negative for dizziness and light-headedness. PAST MEDICAL HISTORY     Past Medical History:   Diagnosis Date    Allergic rhinitis     Asthma     Back pain     GI bleeding 12/18/2014    Glaucoma     both eyes    Hypertension     Knee pain     Morbid obesity due to excess calories ([facility] [address] 11/2/2015    Osteoporosis     Type II or unspecified type diabetes mellitus without mention of complication, not stated as uncontrolled        SURGICAL HISTORY     Past Surgical History:   Procedure Laterality Date    COLONOSCOPY  9/27/2014    polyp removed    COLONOSCOPY  1/30/2020    COLONOSCOPY POLYPECTOMY SNARE/COLD BIOPSY performed by Dante Alvarez MD at [address]  1/30/2020    COLONOSCOPY SUBMUCOSAL/BOTOX INJECTION performed by Dante Alvarez MD at [address] Left     knee scoped and \"cleaned out\"    SMALL INTESTINE SURGERY Left 2/26/2021    ROBOTIC VERSUS LAPAROSCOPIC LEFT COLECTOMY performed by Lieutenant Brian MD at [address]       Discharge Medication List as of 2/20/2022  4:33 PM      CONTINUE these medications which have NOT CHANGED    Details   loratadine (CLARITIN) 10 MG tablet TAKE 1 TAB DAILY, Disp-30 tablet, R-3Normal      !! sertraline (ZOLOFT) 50 MG tablet TAKE 2 TABS TWICE DAILY, Disp-120 tablet, R-6Normal      !! metoprolol tartrate (LOPRESSOR) 25 MG tablet TAKE 1 TAB TWICE DAILY, Disp-60 tablet, R-6Normal      !! metoprolol tartrate (LOPRESSOR) 25 MG tablet TAKE 1 TABLET (25MG) BY MOUTH 2 TIMES A DAY (BLOOD PRESSURE), Disp-60 tablet, R-6DX Code Needed  . Normal      !! sertraline (ZOLOFT) 50 MG tablet TAKE 2 TABLETS (100MG) BY MOUTH 2 TIMES A DAY (MOOD), Disp-120 tablet, R-6Normal      triamcinolone (KENALOG) 0.1 % cream Apply topically 2 times daily. affected ear, Disp-15 g, R-0, Normal      azithromycin (ZITHROMAX) 250 MG tablet Take 2 tabs (500 mg) on Day 1, and take 1 tab (250 mg) on days 2 through 5., Disp-1 packet, R-1Normal      montelukast (SINGULAIR) 10 MG tablet TAKE 1 TABLET (10MG) BY MOUTH EVERY DAY, Disp-30 tablet, R-12Normal      Multiple Vitamins-Minerals (MULTIVITAMIN WITH MINERALS) tablet Take 1 tablet by mouth daily, Disp-30 tablet, R-10Normal      insulin regular (HUMULIN R) 100 UNIT/ML injection Inject 4 Units into the skin See Admin Instructions Before meals for blood sugars  > 150, Disp-10 mL, R-5Normal      insulin NPH (HUMULIN N) 100 UNIT/ML injection vial Inject 13 units twice a day under skin, Disp-23 mL, R-1Normal      Respiratory Therapy Supplies (NEBULIZER/TUBING/MOUTHPIECE) KIT DAILY PRN Starting Tue 3/16/2021, Disp-1 kit, R-0, Normal      albuterol (PROVENTIL) (2.5 MG/3ML) 0.083% nebulizer solution Take 3 mLs by nebulization 3 times daily as needed for Wheezing or Shortness of Breath, Disp-270 mL, R-1Normal      OXYGEN Inhale into the lungsHistorical Med      ondansetron (ZOFRAN-ODT) 4 MG disintegrating tablet Take 1 tablet by mouth every 8 hours as needed for Nausea or Vomiting, Disp-15 tablet, R-0Print      famotidine (PEPCID) 20 MG tablet One a day, Disp-60 tablet, R-10Normal      hydroCHLOROthiazide (HYDRODIURIL) 25 MG tablet TAKE 1 TABLET (25MG) BY MOUTH EVERY DAY (DIURECTIC/ BLOOD PRESSURE), Disp-30 tablet, R-10Normal      vitamin D (ERGOCALCIFEROL) 1.25 MG (82760 UT) CAPS capsule TAKE 1 CAPSULE BY MOUTH EVERY MONTH, Disp-1 capsule, R-0Normal      fluticasone (FLONASE) 50 MCG/ACT nasal spray 1 spray by Nasal route daily, Disp-1 Bottle, R-5Normal      Exenatide (BYDUREON) 2 MG PEN Inject 1 pen into the skin every 7 days, Disp-4 pen,R-5Normal      atorvastatin (LIPITOR) 40 MG tablet TAKE 1 TABLET (40MG) BY MOUTH EVERY DAY (CHOLESTEROL), Disp-30 tablet,R-10Normal      alendronate (FOSAMAX) 70 MG tablet TAKE 1 TAB (70MG) BY MOUTH PER WK BEFORE BREAKFAST EMPTY STOMACH SIT U P 30MIN (BONES), Disp-4 tablet,R-3. Make a 3 month follow visitNormal amLODIPine (NORVASC) 10 MG tablet TAKE 1 TABLET BY MOUTH EVERY DAY, Disp-30 tablet,R-5Normal      Tiotropium Bromide-Olodaterol (STIOLTO RESPIMAT) 2.5-2.5 MCG/ACT AERS 2 ouffs once a day, Disp-1 Inhaler,R-11Normal      metFORMIN (GLUCOPHAGE) 1000 MG tablet TAKE 1 TABLET (1000MG) BY MOUTH 2 TIMES A DAY WITH MEALS (BLOOD SUGAR/DIABETES), Disp-60 tablet,R-12Normal      blood glucose test strips (ACCU-CHEK BRAVO PLUS) strip USE TO TEST GLUCOSE THREE TIMES DAILY, Disp-100 strip,R-10Normal      tiZANidine (ZANAFLEX) 2 MG tablet One or two tabs prn bedtime, Disp-30 tablet,R-1Normal      albuterol sulfate  (90 Base) MCG/ACT inhaler INHALE 2 PUFFS BY MOUTH EVERY 6 HOURS AS NEEDED, Disp-1 Inhaler,R-5Normal      Blood Pressure KIT Disp-1 kit, R-0, NormalCk blood pressure once a week      pravastatin (PRAVACHOL) 20 MG tablet Take one tab a day cancel script pravastatin 10 mg, Disp-30 tablet, R-12Normal      Blood Glucose Monitoring Suppl (TRUE METRIX METER) w/Device KIT Test glucose TID, Disp-1 kit, R-1Normal      acetaminophen (TYLENOL) 500 MG tablet Take 1 tablet by mouth 4 times daily as needed for Pain, Disp-120 tablet, R-1Normal      B-D ULTRAFINE III SHORT PEN 31G X 8 MM MISC Disp-129 each, R-5, Normal      Alcohol Swabs PADS Disp-200 each, R-5, NormalUse TID to test blood glucose      ACCU-CHEK SOFTCLIX LANCETS MISC Disp-100 each, R-5, Normal      beclomethasone (QVAR) 80 MCG/ACT inhaler Inhale 2 puffs into the lungs 2 times daily, Disp-1 Inhaler, R-11Normal      Blood Glucose Monitoring Suppl GRETCHEN Disp-1 Device, R-0, PrintDispense one true track meter  Test glucose twice a day      dorzolamide (TRUSOPT) 2 % ophthalmic solution , R-6      Bimatoprost (LUMIGAN) 0.01 % SOLN Apply  to eye. One drop in each eye at bedtime       brimonidine (ALPHAGAN P) 0.1 % SOLN 1 drop 2 times daily. One drop in both eyes every 12 hours       ! ! - Potential duplicate medications found. Please discuss with provider. ALLERGIES     Pantoprazole sodium, Bactrim [sulfamethoxazole-trimethoprim], Doxycycline, Enalapril, Lisinopril, Milk-related compounds, Mupirocin, Pcn [penicillins], and Tape [adhesive tape]    FAMILYHISTORY       Family History   Problem Relation Age of Onset    Diabetes Mother     Cancer Father         SOCIAL HISTORY       Social History     Socioeconomic History    Marital status:      Spouse name: Not on file    Number of children: Not on file    Years of education: Not on file    Highest education level: Not on file   Occupational History    Not on file   Tobacco Use    Smoking status: Former Smoker     Packs/day: 0.50     Years: 20.00     Pack years: 10.00     Types: Cigarettes     Start date: 1984     Quit date: 1993     Years since quittin.4    Smokeless tobacco: Never Used   Vaping Use    Vaping Use: Never used   Substance and Sexual Activity    Alcohol use: No    Drug use: No    Sexual activity: Not on file   Other Topics Concern    Not on file   Social History Narrative    Not on file     Social Determinants of Health     Financial Resource Strain:     Difficulty of Paying Living Expenses: Not on file   Food Insecurity:     Worried About Running Out of Food in the Last Year: Not on file    [place] of Food in the Last Year: Not on file   Transportation Needs:     Lack of Transportation (Medical): Not on file    Lack of Transportation (Non-Medical):  Not on file   Physical Activity:     Days of Exercise per Week: Not on file    Minutes of Exercise per Session: Not on file   Stress:     Feeling of Stress : Not on file   Social Connections:     Frequency of Communication with Friends and Family: Not on file    Frequency of Social Gatherings with Friends and Family: Not on file    Attends [employer] Services: Not on file    Active Member of Clubs or Organizations: Not on file    Attends Club or Organization Meetings: Not on file    Marital Status: Not on file   Intimate Partner Violence:     Fear of Current or Ex-Partner: Not on file    Emotionally Abused: Not on file    Physically Abused: Not on file    Sexually Abused: Not on file   Housing Stability:     Unable to Pay for Housing in the Last Year: Not on file    Number of [place] in the Last Year: Not on file    Unstable Housing in the Last Year: Not on file       SCREENINGS    Joshua Coma Scale  Eye Opening: Spontaneous  Best Verbal Response: Oriented  Best Motor Response: Obeys commands  Joshua Coma Scale Score: 15        PHYSICAL EXAM    (up to 7 for level 4, 8 or more for level 5)     ED Triage Vitals [02/20/22 1447]   BP Temp Temp Source Pulse Resp SpO2 Height Weight   (!) 170/79 98.3 °F (36.8 °C) Oral 79 18 95 % -- --       Physical Exam  Vitals and nursing note reviewed. Constitutional:       General: She is not in acute distress. Appearance: Normal appearance. She is obese. She is not ill-appearing or diaphoretic. HENT:      Head: Normocephalic and atraumatic. Right Ear: External ear normal.      Left Ear: External ear normal.      Nose: Nose normal. No congestion or rhinorrhea. Mouth/Throat:      Mouth: Mucous membranes are moist.      Pharynx: Oropharynx is clear. No oropharyngeal exudate or posterior oropharyngeal erythema. Eyes:      General: No scleral icterus. Right eye: No discharge. Left eye: No discharge. Extraocular Movements: Extraocular movements intact. Conjunctiva/sclera: Conjunctivae normal.      Pupils: Pupils are equal, round, and reactive to light. Cardiovascular:      Rate and Rhythm: Normal rate and regular rhythm. Pulses: Normal pulses. Heart sounds: Normal heart sounds. No murmur heard. No friction rub. No gallop. Pulmonary:      Effort: Pulmonary effort is normal. No respiratory distress. Breath sounds: Normal breath sounds. No stridor. No wheezing, rhonchi or rales.    Abdominal:      General: There is no distension. Palpations: Abdomen is soft. Tenderness: There is no abdominal tenderness. There is no right CVA tenderness, left CVA tenderness or guarding. Musculoskeletal:         General: Tenderness present. Cervical back: Normal range of motion and neck supple. No rigidity or tenderness. Comments: Patient has chronic bilateral knee pain. Pain reproducible on palpation down right thigh  Change to assess strength given patient chronic arthritis to both knees however strength appear to be similar on both sides. Patient does favor side when ambulating is able to ambulate     Skin:     General: Skin is warm and dry. Capillary Refill: Capillary refill takes less than 2 seconds. Coloration: Skin is not jaundiced. Findings: No rash. Neurological:      General: No focal deficit present. Mental Status: She is alert and oriented to person, place, and time. Psychiatric:         Mood and Affect: Mood normal.         Behavior: Behavior normal.         DIAGNOSTIC RESULTS   LABS:    Labs Reviewed - No data to display    When ordered, only abnormal lab results are displayed. All other labs were within normal range or not returned as of this dictation. EKG: When ordered, EKG's are interpreted by the Emergency Department Physician in the absence of a cardiologist.  Please see their note for interpretation of EKG. RADIOLOGY:   Non-plain film images such as CT, Ultrasound and MRI are read by the radiologist. Plain radiographic images are visualized andpreliminarily interpreted by the  ED Provider with the below findings:        Interpretation perthe Radiologist below, if available at the time of this note:    XR HIP RIGHT (2-3 VIEWS)   Final Result   No acute abnormality of the hip. No results found.       PROCEDURES   Unless otherwise noted below, none     Procedures    CRITICAL CARE TIME   N/A    CONSULTS:  None      EMERGENCY DEPARTMENT COURSE and DIFFERENTIAL DIAGNOSIS/MDM:   Vitals:    Vitals:    02/20/22 1447 02/20/22 1640   BP: (!) 170/79 (!) 188/82   Pulse: 79 79   Resp: 18 14   Temp: 98.3 °F (36.8 °C)    TempSrc: Oral    SpO2: 95% 95%       Patient was given thefollowing medications:  Medications   orphenadrine (NORFLEX) injection 60 mg (60 mg IntraMUSCular Given 2/20/22 1629)           77-year-old female past medical history significant for arthritis and type 2 diabetes presenting to the ED with 3 weeks of right hip pain rating down the back of the thigh towards knee. Please see presentation HPI. Differential Diagnosis: Septic hip joint, Fractured hip, Herniated lumbar disc, Epidural Abscess, Abdominal Aortic Aneurysm, Metastases to back, Cauda Equina Syndrome, Kidney stone, Pyelonephritis, other  PE with pain as above. X-rays ordered per protocol while patient was in waiting room which revealed no acute injury however chart review reveals arthritis to multiple joints. Patient says she has arthritis to hips and spine as well. Patient is able to ambulate has no back pain red flag signs. No saddle anesthesia, no numbness or tingling to groin, no incontinence no IV drug use. Patient is type II diabetic but has had no uncontrolled sugars recently. She is on insulin sliding scale per patient. Patient above, suspect component of sciatica with her pre-existing arthritis. We will send her home with some muscle relaxers, steroids reduce inflammation and lidocaine patches. Did have good discussion with patient about monitoring sugars on steroids and expect you to go up with patient sliding scale and says she is okay to dose insulin as needed based on sugars at home. Patient was referred to her PCP Dr. Sophia Briones to follow-up with him in the next 24 to 48 hours. Patient was given a dose of Norflex while in the ER. I discussed with patient the results of evaluation in the ED, diagnosis, care, and prognosis. The plan is to discharge to home.   Patient is in agreement with plan and questions have been answered. I also discussed with patient the reasons which may require a return visit and the importance of follow-up care. The patient is well-appearing, nontoxic, and improved at the time of discharge. Patient agrees to call to arrange follow-up care as directed. Patient understands to return immediately for worsening/change in symptoms. FINAL IMPRESSION      1.  Sciatica of right side          DISPOSITION/PLAN   DISPOSITION Decision To Discharge 02/20/2022 04:27:53 PM      PATIENT REFERREDTO:  MD Kevin Meadows [id number] [phone]    Call in 1 day  Follow up on your recent ED visit, follow up about knee pain      DISCHARGE MEDICATIONS:  Discharge Medication List as of 2/20/2022  4:33 PM      START taking these medications    Details   cyclobenzaprine (FLEXERIL) 10 MG tablet Take 1 tablet by mouth 3 times daily as needed for Muscle spasms, Disp-21 tablet, R-0Normal      lidocaine (LIDODERM) 5 % Place 1 patch onto the skin daily for 10 days 12 hours on, 12 hours off., Disp-10 patch, R-0Normal      methylPREDNISolone (MEDROL, ANSELMO,) 4 MG tablet Take by mouth., Disp-1 kit, R-0Normal             DISCONTINUED MEDICATIONS:  Discharge Medication List as of 2/20/2022  4:33 PM                 (Please note that portions ofthis note were completed with a voice recognition program.  Efforts were made to edit the dictations but occasionally words are mis-transcribed.)    DAVID Barr CNP (electronically signed)             DAVID Barr CNP  02/20/22 1915

## 2022-02-20 NOTE — ED NOTES
D/C: Order noted for d/c. Pt confirmed d/c paperwork and RX have correct name. Discharge and education instructions reviewed with patient. Teach-back successful. Pt verbalized understanding and signed d/c papers. Pt denied questions at this time. No acute distress noted. Patient instructed to follow-up as noted - return to emergency department if symptoms worsen. Patient verbalized understanding. Discharged per EDMD with discharge instructions. Pt discharged to private vehicle. Patient stable upon departure. Thanked patient for choosing [facility] for care. Provider aware of patient pain at time of discharge.      Valerie Apodaca RN  02/20/22 9873

## 2022-02-20 NOTE — ED PROVIDER NOTES
I independently evaluated and obtained a history and physical on Kianna Andrews. All diagnostic, treatment, and disposition assistants were made to myself in conjunction the advanced practice provider. For further details of this patient's emergency department encounter, please see the advanced practice provider's documentation. History: Patient is a 58-year-old type II diabetic presents with right hip pain. There was no injury. Patient states the pain does radiate to the right leg. There is no focal weakness. Patient seen in conjunction with my LAILA. Patient states these pains been going on for about 3 weeks. Gets worse with movement. Does have history of arthritis. Mostly of pain is on the right hip this time but she has pain to the left knee and sometimes on the right knee as well. Physician Exam: Patient is alert awake vital signs were normal.  My hypertension noted. No obvious deformity on the hip joint. Neurovascular exam was normal.  Able to ambulate but favoring the right. No swelling. Favoring the hip when she walks. No deformity. XR HIP RIGHT (2-3 VIEWS)   Final Result   No acute abnormality of the hip. Patient is 58-year-old from right hip pain without any acute injury x-rays negative. Suspect a some inflammation and arthritis as well as by sciatica. Patient will be placed on some steroids as well as since patient is diabetic will monitor her blood sugar. Patient will follow with family physician. Patient may benefit from losing weight but will adjust that with family physician. Patient may qualify for some type of a total joint replacement.      Jazmine Davis MD  02/20/22 6147

## 2022-04-27 DIAGNOSIS — J45.40 MODERATE PERSISTENT ASTHMA, UNSPECIFIED WHETHER COMPLICATED: ICD-10-CM

## 2022-04-28 RX ORDER — MONTELUKAST SODIUM 10 MG/1
TABLET ORAL
Qty: 30 TABLET | Refills: 0 | Status: SHIPPED | OUTPATIENT
Start: 2022-04-28

## 2022-05-16 DIAGNOSIS — J45.40 MODERATE PERSISTENT ASTHMA, UNSPECIFIED WHETHER COMPLICATED: ICD-10-CM

## 2022-05-16 DIAGNOSIS — I10 ESSENTIAL HYPERTENSION: ICD-10-CM

## 2022-05-17 RX ORDER — MONTELUKAST SODIUM 10 MG/1
TABLET ORAL
Qty: 30 TABLET | Refills: 0 | OUTPATIENT
Start: 2022-05-17

## 2022-06-05 DIAGNOSIS — I10 ESSENTIAL HYPERTENSION: ICD-10-CM

## 2022-06-05 DIAGNOSIS — J45.40 MODERATE PERSISTENT ASTHMA, UNSPECIFIED WHETHER COMPLICATED: ICD-10-CM

## 2022-06-06 RX ORDER — MONTELUKAST SODIUM 10 MG/1
TABLET ORAL
Qty: 30 TABLET | Refills: 0 | OUTPATIENT
Start: 2022-06-06

## 2022-07-06 RX ORDER — CALCIUM CITRATE/VITAMIN D3 200MG-6.25
TABLET ORAL
Qty: 300 STRIP | Refills: 2 | OUTPATIENT
Start: 2022-07-06

## 2022-08-15 DIAGNOSIS — I10 ESSENTIAL HYPERTENSION: ICD-10-CM

## 2022-08-15 DIAGNOSIS — J45.40 MODERATE PERSISTENT ASTHMA, UNSPECIFIED WHETHER COMPLICATED: ICD-10-CM

## 2022-08-17 RX ORDER — MONTELUKAST SODIUM 10 MG/1
TABLET ORAL
Qty: 30 TABLET | Refills: 0 | OUTPATIENT
Start: 2022-08-17

## 2022-09-11 DIAGNOSIS — I10 ESSENTIAL HYPERTENSION: ICD-10-CM

## 2022-09-11 DIAGNOSIS — J45.40 MODERATE PERSISTENT ASTHMA, UNSPECIFIED WHETHER COMPLICATED: ICD-10-CM

## 2022-09-12 RX ORDER — MONTELUKAST SODIUM 10 MG/1
TABLET ORAL
Qty: 30 TABLET | Refills: 0 | OUTPATIENT
Start: 2022-09-12

## 2022-10-04 DIAGNOSIS — J45.40 MODERATE PERSISTENT ASTHMA, UNSPECIFIED WHETHER COMPLICATED: ICD-10-CM

## 2022-10-05 RX ORDER — MONTELUKAST SODIUM 10 MG/1
TABLET ORAL
Qty: 30 TABLET | Refills: 0 | OUTPATIENT
Start: 2022-10-05

## 2022-11-04 ENCOUNTER — APPOINTMENT (OUTPATIENT)
Dept: GENERAL RADIOLOGY | Age: 72
End: 2022-11-04
Payer: COMMERCIAL

## 2022-11-04 ENCOUNTER — HOSPITAL ENCOUNTER (EMERGENCY)
Age: 72
Discharge: HOME | End: 2022-11-04
Payer: COMMERCIAL

## 2022-11-04 VITALS
WEIGHT: 212 LBS | TEMPERATURE: 98.6 F | HEART RATE: 82 BPM | OXYGEN SATURATION: 93 % | HEIGHT: 60 IN | BODY MASS INDEX: 41.62 KG/M2 | DIASTOLIC BLOOD PRESSURE: 75 MMHG | SYSTOLIC BLOOD PRESSURE: 152 MMHG | RESPIRATION RATE: 20 BRPM

## 2022-11-04 DIAGNOSIS — N30.00 ACUTE CYSTITIS WITHOUT HEMATURIA: ICD-10-CM

## 2022-11-04 DIAGNOSIS — G89.29 ACUTE EXACERBATION OF CHRONIC LOW BACK PAIN: Primary | ICD-10-CM

## 2022-11-04 DIAGNOSIS — M41.9 SCOLIOSIS, UNSPECIFIED SCOLIOSIS TYPE, UNSPECIFIED SPINAL REGION: ICD-10-CM

## 2022-11-04 DIAGNOSIS — M54.50 ACUTE EXACERBATION OF CHRONIC LOW BACK PAIN: Primary | ICD-10-CM

## 2022-11-04 LAB
BACTERIA: ABNORMAL /HPF
BILIRUBIN URINE: NEGATIVE
BLOOD, URINE: NEGATIVE
CLARITY: ABNORMAL
COLOR: YELLOW
COMMENT UA: ABNORMAL
EPITHELIAL CELLS, UA: 16 /HPF (ref 0–5)
GLUCOSE URINE: NEGATIVE MG/DL
HYALINE CASTS: 5 /LPF (ref 0–8)
KETONES, URINE: NEGATIVE MG/DL
LEUKOCYTE ESTERASE, URINE: ABNORMAL
MICROSCOPIC EXAMINATION: YES
NITRITE, URINE: POSITIVE
PH UA: 5 (ref 5–8)
PROTEIN UA: NEGATIVE MG/DL
RBC UA: 4 /HPF (ref 0–4)
SPECIFIC GRAVITY UA: 1.02 (ref 1–1.03)
URINE REFLEX TO CULTURE: YES
URINE TYPE: ABNORMAL
UROBILINOGEN, URINE: 0.2 E.U./DL
WBC UA: 33 /HPF (ref 0–5)

## 2022-11-04 PROCEDURE — 6370000000 HC RX 637 (ALT 250 FOR IP): Performed by: PHYSICIAN ASSISTANT

## 2022-11-04 PROCEDURE — 81001 URINALYSIS AUTO W/SCOPE: CPT

## 2022-11-04 PROCEDURE — 72100 X-RAY EXAM L-S SPINE 2/3 VWS: CPT

## 2022-11-04 PROCEDURE — 72070 X-RAY EXAM THORAC SPINE 2VWS: CPT

## 2022-11-04 PROCEDURE — 96372 THER/PROPH/DIAG INJ SC/IM: CPT

## 2022-11-04 PROCEDURE — 99284 EMERGENCY DEPT VISIT MOD MDM: CPT

## 2022-11-04 PROCEDURE — 6360000002 HC RX W HCPCS: Performed by: PHYSICIAN ASSISTANT

## 2022-11-04 PROCEDURE — 87086 URINE CULTURE/COLONY COUNT: CPT

## 2022-11-04 PROCEDURE — 87186 SC STD MICRODIL/AGAR DIL: CPT

## 2022-11-04 PROCEDURE — 87088 URINE BACTERIA CULTURE: CPT

## 2022-11-04 RX ORDER — NITROFURANTOIN 25; 75 MG/1; MG/1
100 CAPSULE ORAL 2 TIMES DAILY
Qty: 14 CAPSULE | Refills: 0 | Status: SHIPPED | OUTPATIENT
Start: 2022-11-04 | End: 2022-11-11

## 2022-11-04 RX ORDER — ORPHENADRINE CITRATE 30 MG/ML
60 INJECTION INTRAMUSCULAR; INTRAVENOUS ONCE
Status: COMPLETED | OUTPATIENT
Start: 2022-11-04 | End: 2022-11-04

## 2022-11-04 RX ORDER — LIDOCAINE 50 MG/G
1 PATCH TOPICAL DAILY
Qty: 30 PATCH | Refills: 0 | Status: SHIPPED | OUTPATIENT
Start: 2022-11-04

## 2022-11-04 RX ORDER — LIDOCAINE 4 G/G
1 PATCH TOPICAL ONCE
Status: DISCONTINUED | OUTPATIENT
Start: 2022-11-04 | End: 2022-11-04 | Stop reason: HOSPADM

## 2022-11-04 RX ORDER — CYCLOBENZAPRINE HCL 10 MG
10 TABLET ORAL 3 TIMES DAILY PRN
Qty: 20 TABLET | Refills: 0 | Status: SHIPPED | OUTPATIENT
Start: 2022-11-04

## 2022-11-04 RX ADMIN — ORPHENADRINE CITRATE 60 MG: 30 INJECTION INTRAMUSCULAR; INTRAVENOUS at 11:26

## 2022-11-04 ASSESSMENT — PAIN SCALES - GENERAL
PAINLEVEL_OUTOF10: 7
PAINLEVEL_OUTOF10: 7

## 2022-11-04 ASSESSMENT — ENCOUNTER SYMPTOMS
WHEEZING: 0
VOMITING: 0
DIARRHEA: 0
COUGH: 0
SHORTNESS OF BREATH: 0
COLOR CHANGE: 0
RECTAL PAIN: 0
ABDOMINAL PAIN: 0
STRIDOR: 0
NAUSEA: 0
CONSTIPATION: 0
BACK PAIN: 1

## 2022-11-04 ASSESSMENT — PAIN DESCRIPTION - LOCATION: LOCATION: BACK

## 2022-11-04 ASSESSMENT — PAIN - FUNCTIONAL ASSESSMENT: PAIN_FUNCTIONAL_ASSESSMENT: 0-10

## 2022-11-04 ASSESSMENT — PAIN DESCRIPTION - ORIENTATION: ORIENTATION: LOWER

## 2022-11-04 ASSESSMENT — LIFESTYLE VARIABLES: HOW OFTEN DO YOU HAVE A DRINK CONTAINING ALCOHOL: NEVER

## 2022-11-04 NOTE — ED PROVIDER NOTES
[facility]        Pt Name: Rossi Leslie  MRN: [id number]  [place] 1950  Date of evaluation: 11/4/2022  Provider: Mario Merrill PA-C  PCP: Faiza Wen MD  Note Started: 12:18 PM EDT       LAILA. I have evaluated this patient. My supervising physician was available for consultation. CHIEF COMPLAINT       Chief Complaint   Patient presents with    Back Pain     Back spasms with back pain that radiates down right or left leg x 3 weeks. C/o urinary frequency, urinary urgency. HISTORY OF PRESENT ILLNESS   (Location, Timing/Onset, Context/Setting, Quality, Duration, Modifying Factors, Severity, Associated Signs and Symptoms)  Note limiting factors. Chief Complaint: Acute exacerbation of chronic back pain    Rossi Leslie is a 74 y.o. female who presents to the emergency department complaining of intermittent mid to low back spasms causing discomfort the past 3 to 4 weeks. She rates her pain to be a 7 out of 10 on pain scale. Although she is not having any radiculopathy at this time, she does report that it does occasionally radiate to bilateral lower extremities. She denies bowel or bladder incontinence or retention. Does report some mild urine frequency urgency symptoms but states that she is a diabetic and does often pee frequently. Denies fever or chills or hematuria. Denies any preceding injury. She used to be on Zanaflex for back spasms but is not currently on a muscle relaxant. She is only taking Tylenol for pain. Nursing Notes were all reviewed and agreed with or any disagreements were addressed in the HPI. REVIEW OF SYSTEMS    (2-9 systems for level 4, 10 or more for level 5)     Review of Systems   Constitutional:  Negative for chills and fever. HENT: Negative. Eyes:  Negative for visual disturbance. Respiratory:  Negative for cough, shortness of breath, wheezing and stridor. Cardiovascular:  Negative for chest pain, palpitations and leg swelling. Gastrointestinal:  Negative for abdominal pain, constipation, diarrhea, nausea, rectal pain and vomiting. Endocrine: Negative. Genitourinary:  Positive for frequency and urgency. Negative for decreased urine volume, dysuria, flank pain, hematuria, pelvic pain, vaginal bleeding and vaginal discharge. Musculoskeletal:  Positive for back pain. Negative for neck pain and neck stiffness. Skin:  Negative for color change, pallor, rash and wound. Neurological: Negative. Psychiatric/Behavioral:  Negative for confusion. All other systems reviewed and are negative. Positives and Pertinent negatives as per HPI. Except as noted above in the ROS, all other systems were reviewed and negative.        PAST MEDICAL HISTORY     Past Medical History:   Diagnosis Date    Allergic rhinitis     Asthma     Back pain     GI bleeding 12/18/2014    Glaucoma     both eyes    Hypertension     Knee pain     Morbid obesity due to excess calories ([facility] [address] 11/2/2015    Osteoporosis     Type II or unspecified type diabetes mellitus without mention of complication, not stated as uncontrolled          SURGICAL HISTORY     Past Surgical History:   Procedure Laterality Date    COLONOSCOPY  9/27/2014    polyp removed    COLONOSCOPY  1/30/2020    COLONOSCOPY POLYPECTOMY SNARE/COLD BIOPSY performed by Jennifer Long MD at [facility]  1/30/2020    COLONOSCOPY SUBMUCOSAL/BOTOX INJECTION performed by Jennifer Long MD at [facility] ([facility])      KNEE SURGERY Left     knee scoped and \"cleaned out\"    SMALL INTESTINE SURGERY Left 2/26/2021    ROBOTIC VERSUS LAPAROSCOPIC LEFT COLECTOMY performed by Frankie Harmon MD at [facility]       Previous Medications    ACCU-CHEK SOFTCLIX LANCETS MISC    USE TO TEST BLOOD SUGAR 3 TIMES A DAY    ACETAMINOPHEN (TYLENOL) 500 MG TABLET    Take 1 tablet by mouth 4 times daily as needed for Pain    ALBUTEROL (PROVENTIL) (2.5 MG/3ML) 0.083% NEBULIZER SOLUTION    Take 3 mLs by nebulization 3 times daily as needed for Wheezing or Shortness of Breath    ALBUTEROL SULFATE  (90 BASE) MCG/ACT INHALER    INHALE 2 PUFFS BY MOUTH EVERY 6 HOURS AS NEEDED    ALCOHOL SWABS PADS    Use TID to test blood glucose    ALENDRONATE (FOSAMAX) 70 MG TABLET    TAKE 1 TAB (70MG) BY MOUTH PER WK BEFORE BREAKFAST EMPTY STOMACH SIT U P 30MIN (BONES)    AMLODIPINE (NORVASC) 10 MG TABLET    TAKE 1 TABLET BY MOUTH EVERY DAY    ATORVASTATIN (LIPITOR) 40 MG TABLET    TAKE 1 TABLET (40MG) BY MOUTH EVERY DAY (CHOLESTEROL)    AZITHROMYCIN (ZITHROMAX) 250 MG TABLET    Take 2 tabs (500 mg) on Day 1, and take 1 tab (250 mg) on days 2 through 5. B-D ULTRAFINE III SHORT PEN 31G X 8 MM MISC    USE TO INJECT INSULIN (QID)    BECLOMETHASONE (QVAR) 80 MCG/ACT INHALER    Inhale 2 puffs into the lungs 2 times daily    BIMATOPROST (LUMIGAN) 0.01 % SOLN    Apply  to eye. One drop in each eye at bedtime     BLOOD GLUCOSE MONITORING SUPPL (TRUE METRIX METER) W/DEVICE KIT    Test glucose TID    BLOOD GLUCOSE MONITORING SUPPL GRETCHEN    Dispense one true track meter   Test glucose twice a day    BLOOD GLUCOSE TEST STRIPS (ACCU-CHEK BRAVO PLUS) STRIP    USE TO TEST GLUCOSE THREE TIMES DAILY    BLOOD PRESSURE KIT    Ck blood pressure once a week    BRIMONIDINE (ALPHAGAN P) 0.1 % SOLN    1 drop 2 times daily.  One drop in both eyes every 12 hours    DORZOLAMIDE (TRUSOPT) 2 % OPHTHALMIC SOLUTION        EXENATIDE (BYDUREON) 2 MG PEN    Inject 1 pen into the skin every 7 days    FAMOTIDINE (PEPCID) 20 MG TABLET    One a day    FLUTICASONE (FLONASE) 50 MCG/ACT NASAL SPRAY    1 spray by Nasal route daily    HYDROCHLOROTHIAZIDE (HYDRODIURIL) 25 MG TABLET    TAKE 1 TABLET (25MG) BY MOUTH EVERY DAY (DIURECTIC/ BLOOD PRESSURE)    INSULIN NPH (HUMULIN N) 100 UNIT/ML INJECTION VIAL    Inject 13 units twice a day under skin    INSULIN REGULAR (HUMULIN R) 100 UNIT/ML INJECTION    Inject 4 Units into the skin See Admin Instructions Before meals for blood sugars  > 150    LORATADINE (CLARITIN) 10 MG TABLET    TAKE 1 TAB DAILY    METFORMIN (GLUCOPHAGE) 1000 MG TABLET    TAKE 1 TABLET (1000MG) BY MOUTH 2 TIMES A DAY WITH MEALS (BLOOD SUGAR/DIABETES)    METOPROLOL TARTRATE (LOPRESSOR) 25 MG TABLET    TAKE 1 TAB TWICE DAILY    METOPROLOL TARTRATE (LOPRESSOR) 25 MG TABLET    TAKE 1 TABLET (25MG) BY MOUTH 2 TIMES A DAY (BLOOD PRESSURE)    MONTELUKAST (SINGULAIR) 10 MG TABLET    TAKE 1 TABLET BY MOUTH EVERY DAY    MULTIPLE VITAMINS-MINERALS (MULTIVITAMIN WITH MINERALS) TABLET    Take 1 tablet by mouth daily    ONDANSETRON (ZOFRAN-ODT) 4 MG DISINTEGRATING TABLET    Take 1 tablet by mouth every 8 hours as needed for Nausea or Vomiting    OXYGEN    Inhale into the lungs    PRAVASTATIN (PRAVACHOL) 20 MG TABLET    Take one tab a day cancel script pravastatin 10 mg    RESPIRATORY THERAPY SUPPLIES (NEBULIZER/TUBING/MOUTHPIECE) KIT    1 kit by Does not apply route daily as needed (PRN)    SERTRALINE (ZOLOFT) 50 MG TABLET    TAKE 2 TABS TWICE DAILY    SERTRALINE (ZOLOFT) 50 MG TABLET    TAKE 2 TABLETS (100MG) BY MOUTH 2 TIMES A DAY (MOOD)    TIOTROPIUM BROMIDE-OLODATEROL (STIOLTO RESPIMAT) 2.5-2.5 MCG/ACT AERS    2 ouffs once a day    TRIAMCINOLONE (KENALOG) 0.1 % CREAM    Apply topically 2 times daily. affected ear    VITAMIN D (ERGOCALCIFEROL) 1.25 MG (98700 UT) CAPS CAPSULE    TAKE 1 CAPSULE BY MOUTH EVERY MONTH         ALLERGIES     Pantoprazole sodium, Bactrim [sulfamethoxazole-trimethoprim], Doxycycline, Enalapril, Lisinopril, Milk-related compounds, Mupirocin, Pcn [penicillins], and Tape [adhesive tape]    FAMILYHISTORY       Family History   Problem Relation Age of Onset    Diabetes Mother     Cancer Father           SOCIAL HISTORY       Social History     Tobacco Use    Smoking status: Former     Packs/day: 0.50     Years: 20.00     Pack years: 10.00     Types: Cigarettes     Start date: 1984     Quit date: 1993     Years since quittin.1    Smokeless tobacco: Never   Vaping Use    Vaping Use: Never used   Substance Use Topics    Alcohol use: No    Drug use: No       SCREENINGS    Nahma Coma Scale  Eye Opening: Spontaneous  Best Verbal Response: Oriented  Best Motor Response: Obeys commands  Joshua Coma Scale Score: 15        PHYSICAL EXAM    (up to 7 for level 4, 8 or more for level 5)     ED Triage Vitals [22 1104]   BP Temp Temp src Heart Rate Resp SpO2 Height Weight   (!) 152/75 98.6 °F (37 °C) -- 82 20 93 % 5' (1.524 m) 212 lb (96.2 kg)       Physical Exam  Vitals and nursing note reviewed. Constitutional:       Appearance: Normal appearance. She is well-developed. She is obese. She is not toxic-appearing or diaphoretic. HENT:      Head: Normocephalic and atraumatic. Right Ear: External ear normal.      Left Ear: External ear normal.      Nose: Nose normal.      Mouth/Throat:      Mouth: Mucous membranes are moist.      Pharynx: Oropharynx is clear. Eyes:      General: No scleral icterus. Right eye: No discharge. Left eye: No discharge. Extraocular Movements: Extraocular movements intact. Conjunctiva/sclera: Conjunctivae normal.      Pupils: Pupils are equal, round, and reactive to light. Cardiovascular:      Rate and Rhythm: Normal rate. Pulmonary:      Effort: Pulmonary effort is normal.      Breath sounds: Normal breath sounds. Abdominal:      General: Bowel sounds are normal. There is no abdominal bruit. Palpations: Abdomen is soft. There is no pulsatile mass. Tenderness: There is no abdominal tenderness. There is no right CVA tenderness, left CVA tenderness, guarding or rebound. Negative signs include Blackwell's sign, Rovsing's sign, McBurney's sign, psoas sign and obturator sign. Musculoskeletal:      Cervical back: Normal and normal range of motion.       Thoracic back: Spasms and tenderness present. No swelling, edema, deformity, signs of trauma, lacerations or bony tenderness. Decreased range of motion. Lumbar back: Spasms and tenderness present. No swelling, edema, deformity, signs of trauma, lacerations or bony tenderness. Decreased range of motion. Negative right straight leg raise test and negative left straight leg raise test.      Comments: No midline vertebral tenderness or step-off deformity. Negative straight leg raise. No saddle paresthesia or foot drop. Able to dorsiflex and plantarflex without difficulty or deficit. 5/5 strength in all four extremities without focal weakness, paresthesia or radiculopathy     Skin:     General: Skin is warm and dry. Coloration: Skin is not jaundiced or pale. Findings: No bruising, erythema, lesion or rash. Neurological:      Mental Status: She is alert and oriented to person, place, and time. Psychiatric:         Behavior: Behavior normal.       DIAGNOSTIC RESULTS   LABS:    Labs Reviewed   URINALYSIS WITH REFLEX TO CULTURE - Abnormal; Notable for the following components:       Result Value    Clarity, UA TURBID (*)     Nitrite, Urine POSITIVE (*)     Leukocyte Esterase, Urine SMALL (*)     All other components within normal limits   MICROSCOPIC URINALYSIS       When ordered only abnormal lab results are displayed. All other labs were within normal range or not returned as of this dictation. EKG: When ordered, EKG's are interpreted by the Emergency Department Physician in the absence of a cardiologist.  Please see their note for interpretation of EKG. RADIOLOGY:   Non-plain film images such as CT, Ultrasound and MRI are read by the radiologist. Plain radiographic images are visualized and preliminarily interpreted by the ED Provider with the below findings:        Interpretation per the Radiologist below, if available at the time of this note:    XR THORACIC SPINE (2 VIEWS)   Final Result   1.  No acute findings. 2. Mild-to-moderate multilevel degenerative disc disease in the thoracic   spine. 3. Mild scoliosis. XR LUMBAR SPINE (2-3 VIEWS)   Final Result   No acute osseous findings. Grade 1 anterolisthesis of L4 over L5. XR THORACIC SPINE (2 VIEWS)    Result Date: 11/4/2022  EXAMINATION: 4 scratch XRAY VIEWS OF THE THORACIC SPINE 11/4/2022 10:07 am COMPARISON: Chest CT 03/04/2021. HISTORY: ORDERING SYSTEM PROVIDED HISTORY: back spasms TECHNOLOGIST PROVIDED HISTORY: Reason for exam:->back spasms Reason for Exam: no injury spasms FINDINGS: There are 12 paired ribs. The spine is normally aligned. Vertebral body heights appear normal. There is mild upper thoracic dextroscoliosis and lower thoracic levoscoliosis. There is mild-to-moderate multilevel degenerative disc disease. The pedicles are intact. The paraspinal soft tissues are unremarkable. 1. No acute findings. 2. Mild-to-moderate multilevel degenerative disc disease in the thoracic spine. 3. Mild scoliosis. XR LUMBAR SPINE (2-3 VIEWS)    Result Date: 11/4/2022  EXAMINATION: XRAY VIEWS OF THE LUMBAR SPINE 11/4/2022 11:06 am COMPARISON: None. HISTORY: ORDERING SYSTEM PROVIDED HISTORY: spasms TECHNOLOGIST PROVIDED HISTORY: Reason for exam:->spasms Reason for Exam: no injury/spasms FINDINGS: Grade 1 anterolisthesis of L4 over L5. Vertebral body height and intervertebral disc space heights are preserved. No acute osseous findings. Grade 1 anterolisthesis of L4 over L5.            PROCEDURES   Unless otherwise noted below, none     Procedures    CRITICAL CARE TIME   N/a    CONSULTS:  None      EMERGENCY DEPARTMENT COURSE and DIFFERENTIAL DIAGNOSIS/MDM:   Vitals:    Vitals:    11/04/22 1104   BP: (!) 152/75   Pulse: 82   Resp: 20   Temp: 98.6 °F (37 °C)   SpO2: 93%   Weight: 212 lb (96.2 kg)   Height: 5' (1.524 m)       Patient was given the following medications:  Medications   lidocaine 4 % external patch 1 patch (1 patch TransDERmal Patch Applied 11/4/22 1126)   orphenadrine (NORFLEX) injection 60 mg (60 mg IntraMUSCular Given 11/4/22 1126)         Is this patient to be included in the SEP-1 Core Measure due to severe sepsis or septic shock? No   Exclusion criteria - the patient is NOT to be included for SEP-1 Core Measure due to:  2+ SIRS criteria are not met    This patient presents to the emergency department complaining of acute exacerbation of chronic mid to low back pain with spasms. She does report occasional radiculopathy to the lower extremities however denies any at this time. Motor and sensory function are preserved. Denies bowel or bladder incontinence or retention. Does get some relief of back spasm with Norflex injection. Will be sent home with medicine to address her discomfort. She also reports some urine frequency and urgency. Abdomen is soft and nontender in all 4 quadrants without pulsatile mass or CVA tenderness. Urinalysis does confirm infection. Patient sent home with antibiotic. Advised to follow-up closely with PCP for recheck and may return to ED per discharge instructions. My suspicion is low for acute surgical abdomen, obstruction, perforation, abscess, mesenteric ischemia, AAA, dissection, cholecystitis, cholangitis, pancreatitis, appendicitis, C. diff colitis, diverticulitis, volvulus, incarcerated hernia, necrotizing fasciitis, TOA, ovarian torsion, PID, ectopic pregnancy, perico ramy Kaden syndrome, help syndrome, preeclampsia, incarcerated hernia, Dary gangrene, pyelonephritis, perinephric abscess, kidney stone, urosepsis, fistula, intussusception, pyloric stenosis, acute spine fracture or dislocation, epidural abscess or hematoma, discitis, meningitis, encephalitis, transverse myelitis, cauda quina,  shingles, or other concerning pathology. FINAL IMPRESSION      1. Acute exacerbation of chronic low back pain    2.  Scoliosis, unspecified scoliosis type, unspecified spinal region 3. Acute cystitis without hematuria          DISPOSITION/PLAN   DISPOSITION Decision To Discharge 11/04/2022 12:17:13 PM      PATIENT REFERRED TO:  MD Mark Salmon [phone]    In 3 days      [facility] Emergency Department  [address] [facility]  [phone]    If symptoms worsen    [address]  [address] [place]        DISCHARGE MEDICATIONS:  New Prescriptions    CYCLOBENZAPRINE (FLEXERIL) 10 MG TABLET    Take 1 tablet by mouth 3 times daily as needed for Muscle spasms    LIDOCAINE (LIDODERM) 5 %    Place 1 patch onto the skin daily 12 hours on, 12 hours off.     NITROFURANTOIN, MACROCRYSTAL-MONOHYDRATE, (MACROBID) 100 MG CAPSULE    Take 1 capsule by mouth 2 times daily for 7 days       DISCONTINUED MEDICATIONS:  Discontinued Medications    TIZANIDINE (ZANAFLEX) 2 MG TABLET    One or two tabs prn bedtime              (Please note that portions of this note were completed with a voice recognition program.  Efforts were made to edit the dictations but occasionally words are mis-transcribed.)    Dilip Colmenares PA-C (electronically signed)            Dilip Colmenares PA-C  11/04/22 1396

## 2022-11-06 LAB
ORGANISM: ABNORMAL
URINE CULTURE, ROUTINE: ABNORMAL

## 2023-06-16 ENCOUNTER — HOSPITAL ENCOUNTER (INPATIENT)
Age: 73
LOS: 3 days | Discharge: HOME | DRG: 392 | End: 2023-06-19
Attending: EMERGENCY MEDICINE | Admitting: HOSPITALIST
Payer: COMMERCIAL

## 2023-06-16 ENCOUNTER — APPOINTMENT (OUTPATIENT)
Dept: CT IMAGING | Age: 73
DRG: 392 | End: 2023-06-16
Payer: COMMERCIAL

## 2023-06-16 DIAGNOSIS — D64.9 SYMPTOMATIC ANEMIA: ICD-10-CM

## 2023-06-16 DIAGNOSIS — K62.5 RECTAL BLEEDING: Primary | ICD-10-CM

## 2023-06-16 DIAGNOSIS — K62.5 BRIGHT RED BLOOD PER RECTUM: ICD-10-CM

## 2023-06-16 PROBLEM — K92.2 GI BLEED: Status: ACTIVE | Noted: 2023-06-16

## 2023-06-16 LAB
ABO + RH BLD: NORMAL
ALBUMIN SERPL-MCNC: 3.8 G/DL (ref 3.4–5)
ALBUMIN/GLOB SERPL: 1.2 {RATIO} (ref 1.1–2.2)
ALP SERPL-CCNC: 93 U/L (ref 40–129)
ALT SERPL-CCNC: 9 U/L (ref 10–40)
ANION GAP SERPL CALCULATED.3IONS-SCNC: 9 MMOL/L (ref 3–16)
APTT BLD: 27.8 SEC (ref 22.7–35.9)
AST SERPL-CCNC: 11 U/L (ref 15–37)
BASOPHILS # BLD: 0 K/UL (ref 0–0.2)
BASOPHILS NFR BLD: 0.5 %
BILIRUB SERPL-MCNC: 0.4 MG/DL (ref 0–1)
BLD GP AB SCN SERPL QL: NORMAL
BLOOD BANK DISPENSE STATUS: NORMAL
BLOOD BANK PRODUCT CODE: NORMAL
BPU ID: NORMAL
BUN SERPL-MCNC: 14 MG/DL (ref 7–20)
CALCIUM SERPL-MCNC: 8.9 MG/DL (ref 8.3–10.6)
CHLORIDE SERPL-SCNC: 102 MMOL/L (ref 99–110)
CO2 SERPL-SCNC: 30 MMOL/L (ref 21–32)
CREAT SERPL-MCNC: 0.8 MG/DL (ref 0.6–1.2)
DEPRECATED RDW RBC AUTO: 15.2 % (ref 12.4–15.4)
DESCRIPTION BLOOD BANK: NORMAL
EKG ATRIAL RATE: 86 BPM
EKG DIAGNOSIS: NORMAL
EKG P AXIS: 48 DEGREES
EKG P-R INTERVAL: 156 MS
EKG Q-T INTERVAL: 374 MS
EKG QRS DURATION: 68 MS
EKG QTC CALCULATION (BAZETT): 447 MS
EKG R AXIS: -4 DEGREES
EKG T AXIS: 31 DEGREES
EKG VENTRICULAR RATE: 86 BPM
EOSINOPHIL # BLD: 0.1 K/UL (ref 0–0.6)
EOSINOPHIL NFR BLD: 1.6 %
GFR SERPLBLD CREATININE-BSD FMLA CKD-EPI: >60 ML/MIN/{1.73_M2}
GLUCOSE BLD-MCNC: 123 MG/DL (ref 70–99)
GLUCOSE BLD-MCNC: 223 MG/DL (ref 70–99)
GLUCOSE SERPL-MCNC: 178 MG/DL (ref 70–99)
HCT VFR BLD AUTO: 32.3 % (ref 36–48)
HGB BLD-MCNC: 10.6 G/DL (ref 12–16)
INR PPP: 0.92 (ref 0.84–1.16)
LYMPHOCYTES # BLD: 2.3 K/UL (ref 1–5.1)
LYMPHOCYTES NFR BLD: 27.9 %
MAGNESIUM SERPL-MCNC: 1.8 MG/DL (ref 1.8–2.4)
MCH RBC QN AUTO: 27.3 PG (ref 26–34)
MCHC RBC AUTO-ENTMCNC: 32.6 G/DL (ref 31–36)
MCV RBC AUTO: 83.5 FL (ref 80–100)
MONOCYTES # BLD: 0.5 K/UL (ref 0–1.3)
MONOCYTES NFR BLD: 5.6 %
NEUTROPHILS # BLD: 5.4 K/UL (ref 1.7–7.7)
NEUTROPHILS NFR BLD: 64.4 %
PERFORMED ON: ABNORMAL
PERFORMED ON: ABNORMAL
PLATELET # BLD AUTO: 300 K/UL (ref 135–450)
PMV BLD AUTO: 7.8 FL (ref 5–10.5)
POTASSIUM SERPL-SCNC: 3.5 MMOL/L (ref 3.5–5.1)
PROT SERPL-MCNC: 7 G/DL (ref 6.4–8.2)
PROTHROMBIN TIME: 12.3 SEC (ref 11.5–14.8)
RBC # BLD AUTO: 3.87 M/UL (ref 4–5.2)
SODIUM SERPL-SCNC: 141 MMOL/L (ref 136–145)
TROPONIN, HIGH SENSITIVITY: 10 NG/L (ref 0–14)
TROPONIN, HIGH SENSITIVITY: 8 NG/L (ref 0–14)
WBC # BLD AUTO: 8.3 K/UL (ref 4–11)

## 2023-06-16 PROCEDURE — 96368 THER/DIAG CONCURRENT INF: CPT

## 2023-06-16 PROCEDURE — 86923 COMPATIBILITY TEST ELECTRIC: CPT

## 2023-06-16 PROCEDURE — 96366 THER/PROPH/DIAG IV INF ADDON: CPT

## 2023-06-16 PROCEDURE — 6360000004 HC RX CONTRAST MEDICATION: Performed by: PHYSICIAN ASSISTANT

## 2023-06-16 PROCEDURE — 2580000003 HC RX 258: Performed by: PHYSICIAN ASSISTANT

## 2023-06-16 PROCEDURE — 36430 TRANSFUSION BLD/BLD COMPNT: CPT

## 2023-06-16 PROCEDURE — 85730 THROMBOPLASTIN TIME PARTIAL: CPT

## 2023-06-16 PROCEDURE — 86901 BLOOD TYPING SEROLOGIC RH(D): CPT

## 2023-06-16 PROCEDURE — 6370000000 HC RX 637 (ALT 250 FOR IP): Performed by: HOSPITALIST

## 2023-06-16 PROCEDURE — 93005 ELECTROCARDIOGRAM TRACING: CPT | Performed by: PHYSICIAN ASSISTANT

## 2023-06-16 PROCEDURE — 86900 BLOOD TYPING SEROLOGIC ABO: CPT

## 2023-06-16 PROCEDURE — 80053 COMPREHEN METABOLIC PANEL: CPT

## 2023-06-16 PROCEDURE — 2500000003 HC RX 250 WO HCPCS: Performed by: PHYSICIAN ASSISTANT

## 2023-06-16 PROCEDURE — 83735 ASSAY OF MAGNESIUM: CPT

## 2023-06-16 PROCEDURE — 96361 HYDRATE IV INFUSION ADD-ON: CPT

## 2023-06-16 PROCEDURE — 30233N1 TRANSFUSION OF NONAUTOLOGOUS RED BLOOD CELLS INTO PERIPHERAL VEIN, PERCUTANEOUS APPROACH: ICD-10-PCS | Performed by: INTERNAL MEDICINE

## 2023-06-16 PROCEDURE — 36415 COLL VENOUS BLD VENIPUNCTURE: CPT

## 2023-06-16 PROCEDURE — 96365 THER/PROPH/DIAG IV INF INIT: CPT

## 2023-06-16 PROCEDURE — 74174 CTA ABD&PLVS W/CONTRAST: CPT

## 2023-06-16 PROCEDURE — 6360000002 HC RX W HCPCS: Performed by: PHYSICIAN ASSISTANT

## 2023-06-16 PROCEDURE — 93010 ELECTROCARDIOGRAM REPORT: CPT | Performed by: INTERNAL MEDICINE

## 2023-06-16 PROCEDURE — 1200000000 HC SEMI PRIVATE

## 2023-06-16 PROCEDURE — 85610 PROTHROMBIN TIME: CPT

## 2023-06-16 PROCEDURE — P9016 RBC LEUKOCYTES REDUCED: HCPCS

## 2023-06-16 PROCEDURE — 84484 ASSAY OF TROPONIN QUANT: CPT

## 2023-06-16 PROCEDURE — 86850 RBC ANTIBODY SCREEN: CPT

## 2023-06-16 PROCEDURE — 99285 EMERGENCY DEPT VISIT HI MDM: CPT

## 2023-06-16 PROCEDURE — 85025 COMPLETE CBC W/AUTO DIFF WBC: CPT

## 2023-06-16 RX ORDER — ONDANSETRON 2 MG/ML
4 INJECTION INTRAMUSCULAR; INTRAVENOUS EVERY 6 HOURS PRN
Status: DISCONTINUED | OUTPATIENT
Start: 2023-06-16 | End: 2023-06-19 | Stop reason: HOSPADM

## 2023-06-16 RX ORDER — SODIUM CHLORIDE 9 MG/ML
INJECTION, SOLUTION INTRAVENOUS PRN
Status: DISCONTINUED | OUTPATIENT
Start: 2023-06-16 | End: 2023-06-19 | Stop reason: HOSPADM

## 2023-06-16 RX ORDER — SODIUM CHLORIDE 0.9 % (FLUSH) 0.9 %
5-40 SYRINGE (ML) INJECTION EVERY 12 HOURS SCHEDULED
Status: DISCONTINUED | OUTPATIENT
Start: 2023-06-16 | End: 2023-06-19 | Stop reason: HOSPADM

## 2023-06-16 RX ORDER — ALBUTEROL SULFATE 2.5 MG/3ML
2.5 SOLUTION RESPIRATORY (INHALATION) 3 TIMES DAILY PRN
Status: DISCONTINUED | OUTPATIENT
Start: 2023-06-16 | End: 2023-06-19 | Stop reason: HOSPADM

## 2023-06-16 RX ORDER — 0.9 % SODIUM CHLORIDE 0.9 %
1000 INTRAVENOUS SOLUTION INTRAVENOUS ONCE
Status: COMPLETED | OUTPATIENT
Start: 2023-06-16 | End: 2023-06-16

## 2023-06-16 RX ORDER — ACETAMINOPHEN 325 MG/1
650 TABLET ORAL EVERY 6 HOURS PRN
Status: DISCONTINUED | OUTPATIENT
Start: 2023-06-16 | End: 2023-06-19 | Stop reason: HOSPADM

## 2023-06-16 RX ORDER — POLYETHYLENE GLYCOL 3350 17 G/17G
17 POWDER, FOR SOLUTION ORAL DAILY PRN
Status: DISCONTINUED | OUTPATIENT
Start: 2023-06-16 | End: 2023-06-19 | Stop reason: HOSPADM

## 2023-06-16 RX ORDER — PILOCARPINE HYDROCHLORIDE 10 MG/ML
1 SOLUTION/ DROPS OPHTHALMIC 3 TIMES DAILY
COMMUNITY
Start: 2023-05-08

## 2023-06-16 RX ORDER — ONDANSETRON 4 MG/1
4 TABLET, ORALLY DISINTEGRATING ORAL EVERY 8 HOURS PRN
Status: DISCONTINUED | OUTPATIENT
Start: 2023-06-16 | End: 2023-06-19 | Stop reason: HOSPADM

## 2023-06-16 RX ORDER — NETARSUDIL 0.2 MG/ML
1 SOLUTION/ DROPS OPHTHALMIC; TOPICAL EVERY EVENING
COMMUNITY
Start: 2023-04-25

## 2023-06-16 RX ORDER — BRIMONIDINE TARTRATE 2 MG/ML
1 SOLUTION/ DROPS OPHTHALMIC 2 TIMES DAILY
Status: DISCONTINUED | OUTPATIENT
Start: 2023-06-16 | End: 2023-06-19 | Stop reason: HOSPADM

## 2023-06-16 RX ORDER — SODIUM CHLORIDE 0.9 % (FLUSH) 0.9 %
5-40 SYRINGE (ML) INJECTION PRN
Status: DISCONTINUED | OUTPATIENT
Start: 2023-06-16 | End: 2023-06-19 | Stop reason: HOSPADM

## 2023-06-16 RX ORDER — ACETAMINOPHEN 500 MG
500 TABLET ORAL 4 TIMES DAILY PRN
Status: DISCONTINUED | OUTPATIENT
Start: 2023-06-16 | End: 2023-06-16 | Stop reason: ALTCHOICE

## 2023-06-16 RX ORDER — SIMETHICONE 80 MG
1 TABLET,CHEWABLE ORAL 2 TIMES DAILY PRN
COMMUNITY
Start: 2023-06-09

## 2023-06-16 RX ORDER — SODIUM CHLORIDE 9 MG/ML
INJECTION, SOLUTION INTRAVENOUS PRN
Status: COMPLETED | OUTPATIENT
Start: 2023-06-16 | End: 2023-06-19

## 2023-06-16 RX ORDER — INSULIN DEGLUDEC INJECTION 100 U/ML
20 INJECTION, SOLUTION SUBCUTANEOUS NIGHTLY
COMMUNITY

## 2023-06-16 RX ORDER — METRONIDAZOLE 500 MG/100ML
500 INJECTION, SOLUTION INTRAVENOUS EVERY 8 HOURS
Status: DISCONTINUED | OUTPATIENT
Start: 2023-06-16 | End: 2023-06-19

## 2023-06-16 RX ORDER — ACETAMINOPHEN 650 MG/1
650 SUPPOSITORY RECTAL EVERY 6 HOURS PRN
Status: DISCONTINUED | OUTPATIENT
Start: 2023-06-16 | End: 2023-06-19 | Stop reason: HOSPADM

## 2023-06-16 RX ORDER — LEVOFLOXACIN 5 MG/ML
750 INJECTION, SOLUTION INTRAVENOUS EVERY 24 HOURS
Status: DISCONTINUED | OUTPATIENT
Start: 2023-06-16 | End: 2023-06-19

## 2023-06-16 RX ORDER — HYDROCHLOROTHIAZIDE 25 MG/1
25 TABLET ORAL DAILY
Status: DISCONTINUED | OUTPATIENT
Start: 2023-06-16 | End: 2023-06-19 | Stop reason: HOSPADM

## 2023-06-16 RX ADMIN — SODIUM CHLORIDE 1000 ML: 9 INJECTION, SOLUTION INTRAVENOUS at 12:30

## 2023-06-16 RX ADMIN — METOPROLOL TARTRATE 25 MG: 25 TABLET, FILM COATED ORAL at 21:24

## 2023-06-16 RX ADMIN — BRIMONIDINE TARTRATE 1 DROP: 2 SOLUTION/ DROPS OPHTHALMIC at 21:23

## 2023-06-16 RX ADMIN — INSULIN HUMAN 13 UNITS: 100 INJECTION, SUSPENSION SUBCUTANEOUS at 18:36

## 2023-06-16 RX ADMIN — METRONIDAZOLE 500 MG: 500 INJECTION, SOLUTION INTRAVENOUS at 18:24

## 2023-06-16 RX ADMIN — IOPAMIDOL 75 ML: 755 INJECTION, SOLUTION INTRAVENOUS at 13:32

## 2023-06-16 RX ADMIN — HYDROCHLOROTHIAZIDE 25 MG: 25 TABLET ORAL at 18:25

## 2023-06-16 RX ADMIN — LEVOFLOXACIN 750 MG: 5 INJECTION, SOLUTION INTRAVENOUS at 18:24

## 2023-06-16 ASSESSMENT — LIFESTYLE VARIABLES
HOW MANY STANDARD DRINKS CONTAINING ALCOHOL DO YOU HAVE ON A TYPICAL DAY: PATIENT DOES NOT DRINK
HOW OFTEN DO YOU HAVE A DRINK CONTAINING ALCOHOL: NEVER

## 2023-06-16 ASSESSMENT — PAIN SCALES - GENERAL
PAINLEVEL_OUTOF10: 0
PAINLEVEL_OUTOF10: 0

## 2023-06-16 ASSESSMENT — PAIN - FUNCTIONAL ASSESSMENT: PAIN_FUNCTIONAL_ASSESSMENT: 0-10

## 2023-06-17 LAB
ANION GAP SERPL CALCULATED.3IONS-SCNC: 8 MMOL/L (ref 3–16)
BASOPHILS # BLD: 0.1 K/UL (ref 0–0.2)
BASOPHILS NFR BLD: 0.5 %
BUN SERPL-MCNC: 13 MG/DL (ref 7–20)
CALCIUM SERPL-MCNC: 8.3 MG/DL (ref 8.3–10.6)
CHLORIDE SERPL-SCNC: 105 MMOL/L (ref 99–110)
CO2 SERPL-SCNC: 27 MMOL/L (ref 21–32)
CREAT SERPL-MCNC: 0.9 MG/DL (ref 0.6–1.2)
DEPRECATED RDW RBC AUTO: 15.3 % (ref 12.4–15.4)
EOSINOPHIL # BLD: 0.1 K/UL (ref 0–0.6)
EOSINOPHIL NFR BLD: 1.3 %
GFR SERPLBLD CREATININE-BSD FMLA CKD-EPI: >60 ML/MIN/{1.73_M2}
GLUCOSE BLD-MCNC: 262 MG/DL (ref 70–99)
GLUCOSE BLD-MCNC: 282 MG/DL (ref 70–99)
GLUCOSE BLD-MCNC: 284 MG/DL (ref 70–99)
GLUCOSE BLD-MCNC: 310 MG/DL (ref 70–99)
GLUCOSE SERPL-MCNC: 230 MG/DL (ref 70–99)
HCT VFR BLD AUTO: 26.9 % (ref 36–48)
HGB BLD-MCNC: 8.8 G/DL (ref 12–16)
LYMPHOCYTES # BLD: 2.5 K/UL (ref 1–5.1)
LYMPHOCYTES NFR BLD: 24.7 %
MAGNESIUM SERPL-MCNC: 1.6 MG/DL (ref 1.8–2.4)
MCH RBC QN AUTO: 27.8 PG (ref 26–34)
MCHC RBC AUTO-ENTMCNC: 32.6 G/DL (ref 31–36)
MCV RBC AUTO: 85.2 FL (ref 80–100)
MONOCYTES # BLD: 0.7 K/UL (ref 0–1.3)
MONOCYTES NFR BLD: 7.1 %
NEUTROPHILS # BLD: 6.7 K/UL (ref 1.7–7.7)
NEUTROPHILS NFR BLD: 66.4 %
PERFORMED ON: ABNORMAL
PLATELET # BLD AUTO: 226 K/UL (ref 135–450)
PMV BLD AUTO: 7.7 FL (ref 5–10.5)
POTASSIUM SERPL-SCNC: 3.4 MMOL/L (ref 3.5–5.1)
RBC # BLD AUTO: 3.16 M/UL (ref 4–5.2)
SODIUM SERPL-SCNC: 140 MMOL/L (ref 136–145)
WBC # BLD AUTO: 10.1 K/UL (ref 4–11)

## 2023-06-17 PROCEDURE — 2580000003 HC RX 258: Performed by: HOSPITALIST

## 2023-06-17 PROCEDURE — 6360000002 HC RX W HCPCS: Performed by: HOSPITALIST

## 2023-06-17 PROCEDURE — 96366 THER/PROPH/DIAG IV INF ADDON: CPT

## 2023-06-17 PROCEDURE — 2500000003 HC RX 250 WO HCPCS: Performed by: PHYSICIAN ASSISTANT

## 2023-06-17 PROCEDURE — 1200000000 HC SEMI PRIVATE

## 2023-06-17 PROCEDURE — 85025 COMPLETE CBC W/AUTO DIFF WBC: CPT

## 2023-06-17 PROCEDURE — 83735 ASSAY OF MAGNESIUM: CPT

## 2023-06-17 PROCEDURE — 36415 COLL VENOUS BLD VENIPUNCTURE: CPT

## 2023-06-17 PROCEDURE — 96368 THER/DIAG CONCURRENT INF: CPT

## 2023-06-17 PROCEDURE — 96367 TX/PROPH/DG ADDL SEQ IV INF: CPT

## 2023-06-17 PROCEDURE — 6360000002 HC RX W HCPCS: Performed by: PHYSICIAN ASSISTANT

## 2023-06-17 PROCEDURE — 6370000000 HC RX 637 (ALT 250 FOR IP): Performed by: HOSPITALIST

## 2023-06-17 PROCEDURE — 80048 BASIC METABOLIC PNL TOTAL CA: CPT

## 2023-06-17 RX ORDER — SODIUM CHLORIDE 0.9 % (FLUSH) 0.9 %
5-40 SYRINGE (ML) INJECTION EVERY 12 HOURS SCHEDULED
Status: CANCELLED | OUTPATIENT
Start: 2023-06-17

## 2023-06-17 RX ORDER — SODIUM CHLORIDE 9 MG/ML
INJECTION, SOLUTION INTRAVENOUS PRN
Status: CANCELLED | OUTPATIENT
Start: 2023-06-17

## 2023-06-17 RX ORDER — SODIUM CHLORIDE 0.9 % (FLUSH) 0.9 %
5-40 SYRINGE (ML) INJECTION PRN
Status: CANCELLED | OUTPATIENT
Start: 2023-06-17

## 2023-06-17 RX ORDER — MECLIZINE HCL 12.5 MG/1
12.5 TABLET ORAL EVERY 8 HOURS PRN
Status: DISCONTINUED | OUTPATIENT
Start: 2023-06-17 | End: 2023-06-19 | Stop reason: HOSPADM

## 2023-06-17 RX ORDER — MAGNESIUM SULFATE IN WATER 40 MG/ML
2000 INJECTION, SOLUTION INTRAVENOUS PRN
Status: DISCONTINUED | OUTPATIENT
Start: 2023-06-17 | End: 2023-06-19 | Stop reason: HOSPADM

## 2023-06-17 RX ORDER — POTASSIUM CHLORIDE 20 MEQ/1
40 TABLET, EXTENDED RELEASE ORAL PRN
Status: DISCONTINUED | OUTPATIENT
Start: 2023-06-17 | End: 2023-06-19 | Stop reason: HOSPADM

## 2023-06-17 RX ORDER — MEPERIDINE HYDROCHLORIDE 25 MG/ML
12.5 INJECTION INTRAMUSCULAR; INTRAVENOUS; SUBCUTANEOUS EVERY 5 MIN PRN
Status: CANCELLED | OUTPATIENT
Start: 2023-06-17

## 2023-06-17 RX ORDER — ONDANSETRON 2 MG/ML
4 INJECTION INTRAMUSCULAR; INTRAVENOUS
Status: CANCELLED | OUTPATIENT
Start: 2023-06-17 | End: 2023-06-18

## 2023-06-17 RX ORDER — POTASSIUM CHLORIDE 7.45 MG/ML
10 INJECTION INTRAVENOUS PRN
Status: DISCONTINUED | OUTPATIENT
Start: 2023-06-17 | End: 2023-06-19 | Stop reason: HOSPADM

## 2023-06-17 RX ORDER — HYDROMORPHONE HCL 110MG/55ML
0.5 PATIENT CONTROLLED ANALGESIA SYRINGE INTRAVENOUS EVERY 5 MIN PRN
Status: CANCELLED | OUTPATIENT
Start: 2023-06-17

## 2023-06-17 RX ORDER — HYDROMORPHONE HCL 110MG/55ML
0.25 PATIENT CONTROLLED ANALGESIA SYRINGE INTRAVENOUS EVERY 5 MIN PRN
Status: CANCELLED | OUTPATIENT
Start: 2023-06-17

## 2023-06-17 RX ORDER — DIPHENHYDRAMINE HYDROCHLORIDE 50 MG/ML
12.5 INJECTION INTRAMUSCULAR; INTRAVENOUS
Status: CANCELLED | OUTPATIENT
Start: 2023-06-17 | End: 2023-06-18

## 2023-06-17 RX ADMIN — HYDROCHLOROTHIAZIDE 25 MG: 25 TABLET ORAL at 09:43

## 2023-06-17 RX ADMIN — METRONIDAZOLE 500 MG: 500 INJECTION, SOLUTION INTRAVENOUS at 09:42

## 2023-06-17 RX ADMIN — LEVOFLOXACIN 750 MG: 5 INJECTION, SOLUTION INTRAVENOUS at 17:07

## 2023-06-17 RX ADMIN — INSULIN HUMAN 13 UNITS: 100 INJECTION, SUSPENSION SUBCUTANEOUS at 08:14

## 2023-06-17 RX ADMIN — MAGNESIUM SULFATE HEPTAHYDRATE 2000 MG: 40 INJECTION, SOLUTION INTRAVENOUS at 09:43

## 2023-06-17 RX ADMIN — BRIMONIDINE TARTRATE 1 DROP: 2 SOLUTION/ DROPS OPHTHALMIC at 09:49

## 2023-06-17 RX ADMIN — POTASSIUM CHLORIDE 40 MEQ: 1500 TABLET, EXTENDED RELEASE ORAL at 09:43

## 2023-06-17 RX ADMIN — METOPROLOL TARTRATE 25 MG: 25 TABLET, FILM COATED ORAL at 21:22

## 2023-06-17 RX ADMIN — Medication 10 ML: at 21:45

## 2023-06-17 RX ADMIN — METRONIDAZOLE 500 MG: 500 INJECTION, SOLUTION INTRAVENOUS at 01:20

## 2023-06-17 RX ADMIN — METOPROLOL TARTRATE 25 MG: 25 TABLET, FILM COATED ORAL at 09:43

## 2023-06-17 RX ADMIN — METRONIDAZOLE 500 MG: 500 INJECTION, SOLUTION INTRAVENOUS at 17:07

## 2023-06-17 RX ADMIN — INSULIN HUMAN 13 UNITS: 100 INJECTION, SUSPENSION SUBCUTANEOUS at 17:07

## 2023-06-17 RX ADMIN — ACETAMINOPHEN 650 MG: 325 TABLET ORAL at 02:49

## 2023-06-17 RX ADMIN — Medication 10 ML: at 09:47

## 2023-06-17 RX ADMIN — ACETAMINOPHEN 650 MG: 325 TABLET ORAL at 17:15

## 2023-06-17 RX ADMIN — BRIMONIDINE TARTRATE 1 DROP: 2 SOLUTION/ DROPS OPHTHALMIC at 21:23

## 2023-06-17 ASSESSMENT — PAIN DESCRIPTION - LOCATION
LOCATION: BACK;HEAD
LOCATION: BACK

## 2023-06-17 ASSESSMENT — PAIN DESCRIPTION - DESCRIPTORS: DESCRIPTORS: ACHING;DISCOMFORT

## 2023-06-17 ASSESSMENT — PAIN SCALES - WONG BAKER
WONGBAKER_NUMERICALRESPONSE: 0
WONGBAKER_NUMERICALRESPONSE: NO HURT

## 2023-06-17 ASSESSMENT — PAIN SCALES - GENERAL
PAINLEVEL_OUTOF10: 3
PAINLEVEL_OUTOF10: 3
PAINLEVEL_OUTOF10: 0
PAINLEVEL_OUTOF10: 6
PAINLEVEL_OUTOF10: 0

## 2023-06-17 ASSESSMENT — PAIN DESCRIPTION - ORIENTATION: ORIENTATION: LOWER

## 2023-06-17 ASSESSMENT — PAIN - FUNCTIONAL ASSESSMENT: PAIN_FUNCTIONAL_ASSESSMENT: PREVENTS OR INTERFERES SOME ACTIVE ACTIVITIES AND ADLS

## 2023-06-18 LAB
ANION GAP SERPL CALCULATED.3IONS-SCNC: 8 MMOL/L (ref 3–16)
BASOPHILS # BLD: 0.1 K/UL (ref 0–0.2)
BASOPHILS NFR BLD: 1 %
BUN SERPL-MCNC: 14 MG/DL (ref 7–20)
CALCIUM SERPL-MCNC: 8.6 MG/DL (ref 8.3–10.6)
CHLORIDE SERPL-SCNC: 103 MMOL/L (ref 99–110)
CO2 SERPL-SCNC: 28 MMOL/L (ref 21–32)
CREAT SERPL-MCNC: 0.8 MG/DL (ref 0.6–1.2)
DEPRECATED RDW RBC AUTO: 15.5 % (ref 12.4–15.4)
EOSINOPHIL # BLD: 0.3 K/UL (ref 0–0.6)
EOSINOPHIL NFR BLD: 2.7 %
GFR SERPLBLD CREATININE-BSD FMLA CKD-EPI: >60 ML/MIN/{1.73_M2}
GLUCOSE BLD-MCNC: 248 MG/DL (ref 70–99)
GLUCOSE BLD-MCNC: 275 MG/DL (ref 70–99)
GLUCOSE BLD-MCNC: 300 MG/DL (ref 70–99)
GLUCOSE SERPL-MCNC: 233 MG/DL (ref 70–99)
HCT VFR BLD AUTO: 27.3 % (ref 36–48)
HGB BLD-MCNC: 8.9 G/DL (ref 12–16)
LYMPHOCYTES # BLD: 2.3 K/UL (ref 1–5.1)
LYMPHOCYTES NFR BLD: 21.3 %
MCH RBC QN AUTO: 27.9 PG (ref 26–34)
MCHC RBC AUTO-ENTMCNC: 32.4 G/DL (ref 31–36)
MCV RBC AUTO: 86 FL (ref 80–100)
MONOCYTES # BLD: 0.8 K/UL (ref 0–1.3)
MONOCYTES NFR BLD: 7.1 %
NEUTROPHILS # BLD: 7.4 K/UL (ref 1.7–7.7)
NEUTROPHILS NFR BLD: 67.9 %
PERFORMED ON: ABNORMAL
PLATELET # BLD AUTO: 214 K/UL (ref 135–450)
PMV BLD AUTO: 8.9 FL (ref 5–10.5)
POTASSIUM SERPL-SCNC: 3.8 MMOL/L (ref 3.5–5.1)
RBC # BLD AUTO: 3.17 M/UL (ref 4–5.2)
SODIUM SERPL-SCNC: 139 MMOL/L (ref 136–145)
WBC # BLD AUTO: 10.9 K/UL (ref 4–11)

## 2023-06-18 PROCEDURE — 6360000002 HC RX W HCPCS: Performed by: PHYSICIAN ASSISTANT

## 2023-06-18 PROCEDURE — 2580000003 HC RX 258: Performed by: HOSPITALIST

## 2023-06-18 PROCEDURE — 6370000000 HC RX 637 (ALT 250 FOR IP): Performed by: HOSPITALIST

## 2023-06-18 PROCEDURE — 96368 THER/DIAG CONCURRENT INF: CPT

## 2023-06-18 PROCEDURE — 85025 COMPLETE CBC W/AUTO DIFF WBC: CPT

## 2023-06-18 PROCEDURE — 80048 BASIC METABOLIC PNL TOTAL CA: CPT

## 2023-06-18 PROCEDURE — 6370000000 HC RX 637 (ALT 250 FOR IP): Performed by: INTERNAL MEDICINE

## 2023-06-18 PROCEDURE — 2500000003 HC RX 250 WO HCPCS: Performed by: PHYSICIAN ASSISTANT

## 2023-06-18 PROCEDURE — 36415 COLL VENOUS BLD VENIPUNCTURE: CPT

## 2023-06-18 PROCEDURE — 1200000000 HC SEMI PRIVATE

## 2023-06-18 PROCEDURE — 96366 THER/PROPH/DIAG IV INF ADDON: CPT

## 2023-06-18 RX ORDER — PEG-3350, SODIUM SULFATE, SODIUM CHLORIDE, POTASSIUM CHLORIDE, SODIUM ASCORBATE AND ASCORBIC ACID 7.5-2.691G
100 KIT ORAL ONCE
Status: COMPLETED | OUTPATIENT
Start: 2023-06-18 | End: 2023-06-18

## 2023-06-18 RX ADMIN — PEG-3350, SODIUM SULFATE, SODIUM CHLORIDE, POTASSIUM CHLORIDE, SODIUM ASCORBATE AND ASCORBIC ACID 100 G: KIT at 16:28

## 2023-06-18 RX ADMIN — METRONIDAZOLE 500 MG: 500 INJECTION, SOLUTION INTRAVENOUS at 17:44

## 2023-06-18 RX ADMIN — Medication 10 ML: at 10:07

## 2023-06-18 RX ADMIN — LEVOFLOXACIN 750 MG: 5 INJECTION, SOLUTION INTRAVENOUS at 17:36

## 2023-06-18 RX ADMIN — INSULIN HUMAN 13 UNITS: 100 INJECTION, SUSPENSION SUBCUTANEOUS at 17:50

## 2023-06-18 RX ADMIN — METRONIDAZOLE 500 MG: 500 INJECTION, SOLUTION INTRAVENOUS at 10:04

## 2023-06-18 RX ADMIN — METOPROLOL TARTRATE 25 MG: 25 TABLET, FILM COATED ORAL at 20:00

## 2023-06-18 RX ADMIN — METRONIDAZOLE 500 MG: 500 INJECTION, SOLUTION INTRAVENOUS at 00:10

## 2023-06-18 RX ADMIN — SODIUM CHLORIDE, PRESERVATIVE FREE 10 ML: 5 INJECTION INTRAVENOUS at 17:38

## 2023-06-18 RX ADMIN — BRIMONIDINE TARTRATE 1 DROP: 2 SOLUTION/ DROPS OPHTHALMIC at 10:02

## 2023-06-18 RX ADMIN — BRIMONIDINE TARTRATE 1 DROP: 2 SOLUTION/ DROPS OPHTHALMIC at 20:02

## 2023-06-18 RX ADMIN — METOPROLOL TARTRATE 25 MG: 25 TABLET, FILM COATED ORAL at 10:01

## 2023-06-18 RX ADMIN — INSULIN HUMAN 13 UNITS: 100 INJECTION, SUSPENSION SUBCUTANEOUS at 10:02

## 2023-06-18 RX ADMIN — HYDROCHLOROTHIAZIDE 25 MG: 25 TABLET ORAL at 10:01

## 2023-06-18 RX ADMIN — PEG-3350, SODIUM SULFATE, SODIUM CHLORIDE, POTASSIUM CHLORIDE, SODIUM ASCORBATE AND ASCORBIC ACID 100 G: KIT at 20:03

## 2023-06-18 ASSESSMENT — PAIN SCALES - GENERAL: PAINLEVEL_OUTOF10: 0

## 2023-06-18 ASSESSMENT — PAIN SCALES - WONG BAKER
WONGBAKER_NUMERICALRESPONSE: 0
WONGBAKER_NUMERICALRESPONSE: NO HURT

## 2023-06-19 ENCOUNTER — ANESTHESIA EVENT (OUTPATIENT)
Dept: ENDOSCOPY | Age: 73
DRG: 394 | End: 2023-06-19
Payer: COMMERCIAL

## 2023-06-19 ENCOUNTER — ANESTHESIA (OUTPATIENT)
Dept: ENDOSCOPY | Age: 73
DRG: 394 | End: 2023-06-19
Payer: COMMERCIAL

## 2023-06-19 VITALS
DIASTOLIC BLOOD PRESSURE: 66 MMHG | HEIGHT: 60 IN | TEMPERATURE: 97.7 F | OXYGEN SATURATION: 95 % | SYSTOLIC BLOOD PRESSURE: 163 MMHG | HEART RATE: 82 BPM | WEIGHT: 233 LBS | RESPIRATION RATE: 16 BRPM | BODY MASS INDEX: 45.75 KG/M2

## 2023-06-19 LAB
ANION GAP SERPL CALCULATED.3IONS-SCNC: 13 MMOL/L (ref 3–16)
BASOPHILS # BLD: 0.1 K/UL (ref 0–0.2)
BASOPHILS NFR BLD: 0.7 %
BUN SERPL-MCNC: 12 MG/DL (ref 7–20)
CALCIUM SERPL-MCNC: 8.6 MG/DL (ref 8.3–10.6)
CHLORIDE SERPL-SCNC: 106 MMOL/L (ref 99–110)
CO2 SERPL-SCNC: 24 MMOL/L (ref 21–32)
CREAT SERPL-MCNC: 0.8 MG/DL (ref 0.6–1.2)
DEPRECATED RDW RBC AUTO: 15.3 % (ref 12.4–15.4)
EOSINOPHIL # BLD: 0.2 K/UL (ref 0–0.6)
EOSINOPHIL NFR BLD: 2 %
GFR SERPLBLD CREATININE-BSD FMLA CKD-EPI: >60 ML/MIN/{1.73_M2}
GLUCOSE BLD-MCNC: 170 MG/DL (ref 70–99)
GLUCOSE BLD-MCNC: 209 MG/DL (ref 70–99)
GLUCOSE BLD-MCNC: 221 MG/DL (ref 70–99)
GLUCOSE BLD-MCNC: 272 MG/DL (ref 70–99)
GLUCOSE SERPL-MCNC: 183 MG/DL (ref 70–99)
HCT VFR BLD AUTO: 25.7 % (ref 36–48)
HGB BLD-MCNC: 8.4 G/DL (ref 12–16)
LYMPHOCYTES # BLD: 2.4 K/UL (ref 1–5.1)
LYMPHOCYTES NFR BLD: 23.6 %
MAGNESIUM SERPL-MCNC: 1.9 MG/DL (ref 1.8–2.4)
MCH RBC QN AUTO: 28.2 PG (ref 26–34)
MCHC RBC AUTO-ENTMCNC: 32.8 G/DL (ref 31–36)
MCV RBC AUTO: 86.1 FL (ref 80–100)
MONOCYTES # BLD: 0.8 K/UL (ref 0–1.3)
MONOCYTES NFR BLD: 7.7 %
NEUTROPHILS # BLD: 6.7 K/UL (ref 1.7–7.7)
NEUTROPHILS NFR BLD: 66 %
PERFORMED ON: ABNORMAL
PLATELET # BLD AUTO: 231 K/UL (ref 135–450)
PMV BLD AUTO: 8.1 FL (ref 5–10.5)
POTASSIUM SERPL-SCNC: 3.5 MMOL/L (ref 3.5–5.1)
RBC # BLD AUTO: 2.99 M/UL (ref 4–5.2)
SODIUM SERPL-SCNC: 143 MMOL/L (ref 136–145)
WBC # BLD AUTO: 10.2 K/UL (ref 4–11)

## 2023-06-19 PROCEDURE — 83735 ASSAY OF MAGNESIUM: CPT

## 2023-06-19 PROCEDURE — 7100000000 HC PACU RECOVERY - FIRST 15 MIN: Performed by: INTERNAL MEDICINE

## 2023-06-19 PROCEDURE — 6370000000 HC RX 637 (ALT 250 FOR IP): Performed by: HOSPITALIST

## 2023-06-19 PROCEDURE — 85025 COMPLETE CBC W/AUTO DIFF WBC: CPT

## 2023-06-19 PROCEDURE — 2709999900 HC NON-CHARGEABLE SUPPLY: Performed by: INTERNAL MEDICINE

## 2023-06-19 PROCEDURE — 2580000003 HC RX 258: Performed by: HOSPITALIST

## 2023-06-19 PROCEDURE — 2580000003 HC RX 258: Performed by: PHYSICIAN ASSISTANT

## 2023-06-19 PROCEDURE — 96366 THER/PROPH/DIAG IV INF ADDON: CPT

## 2023-06-19 PROCEDURE — 6360000002 HC RX W HCPCS: Performed by: NURSE ANESTHETIST, CERTIFIED REGISTERED

## 2023-06-19 PROCEDURE — 3700000001 HC ADD 15 MINUTES (ANESTHESIA): Performed by: INTERNAL MEDICINE

## 2023-06-19 PROCEDURE — 2500000003 HC RX 250 WO HCPCS: Performed by: NURSE ANESTHETIST, CERTIFIED REGISTERED

## 2023-06-19 PROCEDURE — 0DJD8ZZ INSPECTION OF LOWER INTESTINAL TRACT, VIA NATURAL OR ARTIFICIAL OPENING ENDOSCOPIC: ICD-10-PCS | Performed by: INTERNAL MEDICINE

## 2023-06-19 PROCEDURE — 3609027000 HC COLONOSCOPY: Performed by: INTERNAL MEDICINE

## 2023-06-19 PROCEDURE — 3700000000 HC ANESTHESIA ATTENDED CARE: Performed by: INTERNAL MEDICINE

## 2023-06-19 PROCEDURE — 2580000003 HC RX 258: Performed by: INTERNAL MEDICINE

## 2023-06-19 PROCEDURE — 80048 BASIC METABOLIC PNL TOTAL CA: CPT

## 2023-06-19 PROCEDURE — 6370000000 HC RX 637 (ALT 250 FOR IP): Performed by: INTERNAL MEDICINE

## 2023-06-19 PROCEDURE — 2500000003 HC RX 250 WO HCPCS: Performed by: PHYSICIAN ASSISTANT

## 2023-06-19 PROCEDURE — 7100000001 HC PACU RECOVERY - ADDTL 15 MIN: Performed by: INTERNAL MEDICINE

## 2023-06-19 PROCEDURE — 36415 COLL VENOUS BLD VENIPUNCTURE: CPT

## 2023-06-19 RX ORDER — MECLIZINE HCL 12.5 MG/1
12.5 TABLET ORAL EVERY 8 HOURS PRN
Qty: 30 TABLET | Refills: 0 | Status: SHIPPED | OUTPATIENT
Start: 2023-06-19 | End: 2023-06-29

## 2023-06-19 RX ORDER — LEVOFLOXACIN 750 MG/1
750 TABLET ORAL DAILY
Qty: 7 TABLET | Refills: 0 | Status: SHIPPED | OUTPATIENT
Start: 2023-06-19 | End: 2023-06-26

## 2023-06-19 RX ORDER — PROPOFOL 10 MG/ML
INJECTION, EMULSION INTRAVENOUS CONTINUOUS PRN
Status: DISCONTINUED | OUTPATIENT
Start: 2023-06-19 | End: 2023-06-19 | Stop reason: SDUPTHER

## 2023-06-19 RX ORDER — LIDOCAINE HYDROCHLORIDE 20 MG/ML
INJECTION, SOLUTION EPIDURAL; INFILTRATION; INTRACAUDAL; PERINEURAL PRN
Status: DISCONTINUED | OUTPATIENT
Start: 2023-06-19 | End: 2023-06-19 | Stop reason: SDUPTHER

## 2023-06-19 RX ORDER — METRONIDAZOLE 500 MG/1
500 TABLET ORAL 3 TIMES DAILY
Qty: 21 TABLET | Refills: 0 | Status: SHIPPED | OUTPATIENT
Start: 2023-06-19 | End: 2023-06-26

## 2023-06-19 RX ORDER — PROPOFOL 10 MG/ML
INJECTION, EMULSION INTRAVENOUS PRN
Status: DISCONTINUED | OUTPATIENT
Start: 2023-06-19 | End: 2023-06-19 | Stop reason: SDUPTHER

## 2023-06-19 RX ADMIN — HYDROCHLOROTHIAZIDE 25 MG: 25 TABLET ORAL at 09:24

## 2023-06-19 RX ADMIN — SODIUM CHLORIDE: 9 INJECTION, SOLUTION INTRAVENOUS at 08:00

## 2023-06-19 RX ADMIN — METRONIDAZOLE 500 MG: 500 INJECTION, SOLUTION INTRAVENOUS at 01:44

## 2023-06-19 RX ADMIN — Medication 10 ML: at 09:07

## 2023-06-19 RX ADMIN — PROPOFOL 150 MCG/KG/MIN: 10 INJECTION, EMULSION INTRAVENOUS at 08:00

## 2023-06-19 RX ADMIN — PROPOFOL 50 MG: 10 INJECTION, EMULSION INTRAVENOUS at 08:00

## 2023-06-19 RX ADMIN — LIDOCAINE HYDROCHLORIDE 80 MG: 20 INJECTION, SOLUTION EPIDURAL; INFILTRATION; INTRACAUDAL; PERINEURAL at 08:00

## 2023-06-19 RX ADMIN — METRONIDAZOLE 500 MG: 500 INJECTION, SOLUTION INTRAVENOUS at 09:27

## 2023-06-19 RX ADMIN — BRIMONIDINE TARTRATE 1 DROP: 2 SOLUTION/ DROPS OPHTHALMIC at 09:07

## 2023-06-19 RX ADMIN — METOPROLOL TARTRATE 25 MG: 25 TABLET, FILM COATED ORAL at 09:24

## 2023-06-19 ASSESSMENT — PAIN SCALES - GENERAL: PAINLEVEL_OUTOF10: 0

## 2023-06-19 NOTE — PROGRESS NOTES
Pt a&ox4. RA, unlabored breathing. VS stable. PT denies any pain at this time. Call light within reach.

## 2023-06-19 NOTE — PROGRESS NOTES
Reviewed patient's medical and surgical history in electronic record and with patient at the bedside. All questions regarding procedure answered. Scope verified using 2 person system. Sammie Quintana

## 2023-06-19 NOTE — CARE COORDINATION
06/19/23 1341   IMM Letter   IMM Letter given to Patient/Family/Significant other/Guardian/POA/by: Jaquan Pruett   IMM Letter date given: 06/19/23   IMM Letter time given: 1341

## 2023-06-19 NOTE — ANESTHESIA POSTPROCEDURE EVALUATION
Department of Anesthesiology  Postprocedure Note    Patient: Ariana Crump  MRN: [id number]  YOB: 1950  Date of evaluation: 6/19/2023      Procedure Summary     Date: 06/19/23 Room / Location: [address] [place] [address]    Anesthesia Start: 4242 Anesthesia Stop:     Procedure: COLONOSCOPY DIAGNOSTIC Diagnosis:       Rectal bleeding      (Rectal bleeding [K62.5])    Surgeons: Racquel Almodovar MD Responsible Provider: Maddie Ortiz MD    Anesthesia Type: MAC ASA Status: 3          Anesthesia Type: No value filed.     Allegra Phase I:      Allegra Phase II:        Anesthesia Post Evaluation    Patient location during evaluation: PACU  Patient participation: complete - patient participated  Level of consciousness: awake and awake and alert  Pain score: 2  Airway patency: patent  Nausea & Vomiting: no vomiting  Complications: no  Cardiovascular status: blood pressure returned to baseline  Respiratory status: acceptable  Hydration status: euvolemic  Multimodal analgesia pain management approach

## 2023-06-19 NOTE — PLAN OF CARE
Problem: Pain  Goal: Verbalizes/displays adequate comfort level or baseline comfort level  6/18/2023 2210 by Miranda Blount RN  Outcome: Progressing   Patient denies pain at this time. Problem: Safety - Adult  Goal: Free from fall injury  6/18/2023 2210 by Miranda Blount RN  Outcome: Progressing   Patient has remained free of falls. 2/4 bed rails up, bed locked and in lowest position, call light within reach. Patient instructed on use of call light and uses appropriately. Bed alarm on. Non-skid footwear and fall band on.

## 2023-06-19 NOTE — ANESTHESIA PRE PROCEDURE
30-59 ml/min ([facility]) N18.30    Moderate persistent asthma without complication E65.38    Morbid obesity due to excess calories ([facility]) E66.01    Asthma J45.909    Essential hypertension I10    Lateral meniscus tear S83.289A    Cancer of descending colon ([facility]) C18.6    Cancer of left colon ([facility]) C18.6    GI bleed K92.2       Past Medical History:        Diagnosis Date    Allergic rhinitis     Asthma     Back pain     [facility] ([facility] [address]     GI bleeding 2014    Glaucoma     both eyes    Hypertension     Knee pain     Morbid obesity due to excess calories ([address] 2015    Osteoporosis     Type II or unspecified type diabetes mellitus without mention of complication, not stated as uncontrolled        Past Surgical History:        Procedure Laterality Date    CATARACT REMOVAL WITH IMPLANT Bilateral     COLONOSCOPY  2014    polyp removed    COLONOSCOPY  2020    COLONOSCOPY POLYPECTOMY SNARE/COLD BIOPSY performed by Nicole Valencia MD at [address]  2020    COLONOSCOPY SUBMUCOSAL/BOTOX INJECTION performed by iNcole Valencia MD at [address] Bilateral     HYSTERECTOMY (CERVIX STATUS UNKNOWN)      KNEE SURGERY Left     knee scoped and \"cleaned out\"    SMALL INTESTINE SURGERY Left 2021    ROBOTIC VERSUS LAPAROSCOPIC LEFT COLECTOMY performed by Elaine Garcia MD at [address] History:    Social History     Tobacco Use    Smoking status: Former     Packs/day: 0.50     Years: 20.00     Pack years: 10.00     Types: Cigarettes     Start date: 1984     Quit date: 1993     Years since quittin.7    Smokeless tobacco: Never   Substance Use Topics    Alcohol use: No                                Counseling given: Not Answered      Vital Signs (Current): There were no vitals filed for this visit.                                            BP Readings from Last 3 Encounters:   23 97/60

## 2023-06-19 NOTE — PROGRESS NOTES
Patient is alert and oriented x4, VSS. Denies pain. Continued bowel movements, becoming more clear but still bloody. No dizziness or trouble ambulating. NPO since midnight. Fall precautions in place.

## 2023-06-19 NOTE — BRIEF OP NOTE
Colonoscopy:   no blood in colon. Only fair prep                           Pancolonic tics                           Internal and external hemorrhoids. No  colitis. Normal colonic mucosa. Rec:   pt can complete abx course              If continued bleeding occurs could f/u with colorectal surgery for hemorrhoid treatment             Will sign off. Call with questions.          Casandra Nichols MD  [address] [facility]

## 2023-06-19 NOTE — PROGRESS NOTES
Pt arrived from endo to PACU bay 4. Report received from endo staff. Pt arouses easily to voice. Pt on 3 L NC, NSR, VSS. Will continue to monitor.

## 2023-09-01 ENCOUNTER — HOSPITAL ENCOUNTER (EMERGENCY)
Age: 73
Discharge: HOME | End: 2023-09-02
Payer: COMMERCIAL

## 2023-09-01 ENCOUNTER — APPOINTMENT (OUTPATIENT)
Dept: GENERAL RADIOLOGY | Age: 73
End: 2023-09-01
Payer: COMMERCIAL

## 2023-09-01 VITALS — HEART RATE: 81 BPM | DIASTOLIC BLOOD PRESSURE: 81 MMHG | TEMPERATURE: 98.7 F | SYSTOLIC BLOOD PRESSURE: 173 MMHG
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
## 2023-09-01 DIAGNOSIS — S70.02XA CONTUSION OF LEFT HIP, INITIAL ENCOUNTER: ICD-10-CM

## 2023-09-01 DIAGNOSIS — S20.212A CONTUSION OF LEFT CHEST WALL, INITIAL ENCOUNTER: Primary | ICD-10-CM

## 2023-09-01 PROCEDURE — 73502 X-RAY EXAM HIP UNI 2-3 VIEWS: CPT

## 2023-09-01 PROCEDURE — 71101 X-RAY EXAM UNILAT RIBS/CHEST: CPT

## 2023-09-01 PROCEDURE — 6370000000 HC RX 637 (ALT 250 FOR IP): Performed by: PHYSICIAN ASSISTANT

## 2023-09-01 PROCEDURE — 99283 EMERGENCY DEPT VISIT LOW MDM: CPT

## 2023-09-01 RX ORDER — ACETAMINOPHEN 500 MG
1000 TABLET ORAL ONCE
Status: COMPLETED | OUTPATIENT
Start: 2023-09-01 | End: 2023-09-01

## 2023-09-01 RX ADMIN — ACETAMINOPHEN 1000 MG: 500 TABLET ORAL at 23:30

## 2023-09-01 ASSESSMENT — PAIN DESCRIPTION - PAIN TYPE: TYPE: ACUTE PAIN

## 2023-09-01 ASSESSMENT — PAIN - FUNCTIONAL ASSESSMENT: PAIN_FUNCTIONAL_ASSESSMENT: 0-10

## 2023-09-01 ASSESSMENT — PAIN DESCRIPTION - LOCATION: LOCATION: RIB CAGE

## 2023-09-01 ASSESSMENT — PAIN SCALES - GENERAL
PAINLEVEL_OUTOF10: 9
PAINLEVEL_OUTOF10: 10

## 2023-09-01 ASSESSMENT — PAIN DESCRIPTION - ORIENTATION: ORIENTATION: LEFT

## 2023-09-02 ASSESSMENT — PAIN - FUNCTIONAL ASSESSMENT: PAIN_FUNCTIONAL_ASSESSMENT: NONE - DENIES PAIN

## 2023-09-02 NOTE — DISCHARGE INSTRUCTIONS
X-rays of the hip, chest and ribs showed no fracture. Expect soreness over the next several days. I do recommend Tylenol 1000 mg every 6 or 8 hours for pain control. Apply ice to the tender site. If your symptoms continue and do not improve within 1 to 2 weeks you may need a CT scan of your chest to further evaluate the ribs.

## 2023-09-02 NOTE — ED TRIAGE NOTES
Patient with chronic difficulty walking, states she walked into the bathroom at [facility] and fell on left ribs.

## 2023-09-02 NOTE — ED NOTES
Discharge and education instructions reviewed. Patient verbalized understanding, teach-back successful. Patient denied questions at this time. No acute distress noted. Patient instructed to follow-up as noted - return to emergency department if symptoms worsen. Patient verbalized understanding. Discharged per EDMD with discharge instructions.         Jennifer Buckley RN  09/02/23 7929

## 2024-03-19 ENCOUNTER — HOSPITAL ENCOUNTER (OUTPATIENT)
Dept: MAMMOGRAPHY | Age: 74
Discharge: HOME | End: 2024-03-19
Payer: COMMERCIAL

## 2024-03-19 DIAGNOSIS — Z12.31 VISIT FOR SCREENING MAMMOGRAM: ICD-10-CM

## 2024-03-19 PROCEDURE — 77063 BREAST TOMOSYNTHESIS BI: CPT

## 2024-09-08 ENCOUNTER — APPOINTMENT (OUTPATIENT)
Dept: CT IMAGING | Age: 74
DRG: 378 | End: 2024-09-08
Payer: COMMERCIAL

## 2024-09-08 ENCOUNTER — HOSPITAL ENCOUNTER (INPATIENT)
Age: 74
LOS: 3 days | Discharge: HOME | DRG: 378 | End: 2024-09-11
Attending: INTERNAL MEDICINE | Admitting: INTERNAL MEDICINE
Payer: COMMERCIAL

## 2024-09-08 DIAGNOSIS — D64.9 ANEMIA, UNSPECIFIED TYPE: ICD-10-CM

## 2024-09-08 DIAGNOSIS — K62.5 RECTAL BLEEDING: Primary | ICD-10-CM

## 2024-09-08 PROBLEM — K92.2 ACUTE GI BLEEDING: Status: ACTIVE | Noted: 2024-09-08

## 2024-09-08 LAB
ABO + RH BLD: NORMAL
ALBUMIN SERPL-MCNC: 3.7 G/DL (ref 3.4–5)
ALBUMIN/GLOB SERPL: 1.2 {RATIO} (ref 1.1–2.2)
ALP SERPL-CCNC: 73 U/L (ref 40–129)
ALT SERPL-CCNC: 9 U/L (ref 10–40)
ANION GAP SERPL CALCULATED.3IONS-SCNC: 13 MMOL/L (ref 3–16)
APTT BLD: 26.5 SEC (ref 22.1–36.4)
AST SERPL-CCNC: 11 U/L (ref 15–37)
BASOPHILS # BLD: 0.1 K/UL (ref 0–0.2)
BASOPHILS NFR BLD: 1.2 %
BILIRUB SERPL-MCNC: 0.5 MG/DL (ref 0–1)
BLD GP AB SCN SERPL QL: NORMAL
BUN SERPL-MCNC: 19 MG/DL (ref 7–20)
CALCIUM SERPL-MCNC: 9 MG/DL (ref 8.3–10.6)
CHLORIDE SERPL-SCNC: 104 MMOL/L (ref 99–110)
CO2 SERPL-SCNC: 25 MMOL/L (ref 21–32)
CREAT SERPL-MCNC: 0.9 MG/DL (ref 0.6–1.2)
DEPRECATED RDW RBC AUTO: 14.7 % (ref 12.4–15.4)
DEPRECATED RDW RBC AUTO: 14.9 % (ref 12.4–15.4)
EOSINOPHIL # BLD: 0.1 K/UL (ref 0–0.6)
EOSINOPHIL NFR BLD: 1.3 %
GFR SERPLBLD CREATININE-BSD FMLA CKD-EPI: 66 ML/MIN/{1.73_M2}
GLUCOSE BLD-MCNC: 239 MG/DL (ref 70–99)
GLUCOSE SERPL-MCNC: 245 MG/DL (ref 70–99)
HCT VFR BLD AUTO: 28.3 % (ref 36–48)
HCT VFR BLD AUTO: 28.7 % (ref 36–48)
HCT VFR BLD AUTO: 33.4 % (ref 36–48)
HGB BLD-MCNC: 11.2 G/DL (ref 12–16)
HGB BLD-MCNC: 9 G/DL (ref 12–16)
HGB BLD-MCNC: 9.2 G/DL (ref 12–16)
INR PPP: 1.03 (ref 0.85–1.15)
LYMPHOCYTES # BLD: 1.9 K/UL (ref 1–5.1)
LYMPHOCYTES NFR BLD: 29.3 %
MCH RBC QN AUTO: 27.5 PG (ref 26–34)
MCH RBC QN AUTO: 28.5 PG (ref 26–34)
MCHC RBC AUTO-ENTMCNC: 32.1 G/DL (ref 31–36)
MCHC RBC AUTO-ENTMCNC: 33.6 G/DL (ref 31–36)
MCV RBC AUTO: 84.9 FL (ref 80–100)
MCV RBC AUTO: 85.7 FL (ref 80–100)
MONOCYTES # BLD: 0.3 K/UL (ref 0–1.3)
MONOCYTES NFR BLD: 4.7 %
NEUTROPHILS # BLD: 4.2 K/UL (ref 1.7–7.7)
NEUTROPHILS NFR BLD: 63.5 %
PERFORMED ON: ABNORMAL
PLATELET # BLD AUTO: 254 K/UL (ref 135–450)
PLATELET # BLD AUTO: 308 K/UL (ref 135–450)
PMV BLD AUTO: 7.8 FL (ref 5–10.5)
PMV BLD AUTO: 8 FL (ref 5–10.5)
POTASSIUM SERPL-SCNC: 3.8 MMOL/L (ref 3.5–5.1)
PROT SERPL-MCNC: 6.8 G/DL (ref 6.4–8.2)
PROTHROMBIN TIME: 13.7 SEC (ref 11.9–14.9)
RBC # BLD AUTO: 3.34 M/UL (ref 4–5.2)
RBC # BLD AUTO: 3.93 M/UL (ref 4–5.2)
SODIUM SERPL-SCNC: 142 MMOL/L (ref 136–145)
WBC # BLD AUTO: 6.6 K/UL (ref 4–11)
WBC # BLD AUTO: 7 K/UL (ref 4–11)

## 2024-09-08 PROCEDURE — 86923 COMPATIBILITY TEST ELECTRIC: CPT

## 2024-09-08 PROCEDURE — 6370000000 HC RX 637 (ALT 250 FOR IP): Performed by: INTERNAL MEDICINE

## 2024-09-08 PROCEDURE — 6360000004 HC RX CONTRAST MEDICATION: Performed by: PHYSICIAN ASSISTANT

## 2024-09-08 PROCEDURE — 2580000003 HC RX 258: Performed by: INTERNAL MEDICINE

## 2024-09-08 PROCEDURE — 85027 COMPLETE CBC AUTOMATED: CPT

## 2024-09-08 PROCEDURE — 86850 RBC ANTIBODY SCREEN: CPT

## 2024-09-08 PROCEDURE — 74174 CTA ABD&PLVS W/CONTRAST: CPT

## 2024-09-08 PROCEDURE — 85730 THROMBOPLASTIN TIME PARTIAL: CPT

## 2024-09-08 PROCEDURE — 36415 COLL VENOUS BLD VENIPUNCTURE: CPT

## 2024-09-08 PROCEDURE — 80053 COMPREHEN METABOLIC PANEL: CPT

## 2024-09-08 PROCEDURE — 2060000000 HC ICU INTERMEDIATE R&B

## 2024-09-08 PROCEDURE — 86901 BLOOD TYPING SEROLOGIC RH(D): CPT

## 2024-09-08 PROCEDURE — 99285 EMERGENCY DEPT VISIT HI MDM: CPT

## 2024-09-08 PROCEDURE — 85610 PROTHROMBIN TIME: CPT

## 2024-09-08 PROCEDURE — 85014 HEMATOCRIT: CPT

## 2024-09-08 PROCEDURE — 30233N1 TRANSFUSION OF NONAUTOLOGOUS RED BLOOD CELLS INTO PERIPHERAL VEIN, PERCUTANEOUS APPROACH: ICD-10-PCS | Performed by: INTERNAL MEDICINE

## 2024-09-08 PROCEDURE — P9016 RBC LEUKOCYTES REDUCED: HCPCS

## 2024-09-08 PROCEDURE — 2580000003 HC RX 258: Performed by: PHYSICIAN ASSISTANT

## 2024-09-08 PROCEDURE — 85025 COMPLETE CBC W/AUTO DIFF WBC: CPT

## 2024-09-08 PROCEDURE — 85018 HEMOGLOBIN: CPT

## 2024-09-08 PROCEDURE — 86900 BLOOD TYPING SEROLOGIC ABO: CPT

## 2024-09-08 RX ORDER — METOPROLOL TARTRATE 25 MG/1
25 TABLET, FILM COATED ORAL 2 TIMES DAILY
Status: DISCONTINUED | OUTPATIENT
Start: 2024-09-08 | End: 2024-09-11 | Stop reason: HOSPADM

## 2024-09-08 RX ORDER — ACETAMINOPHEN 325 MG/1
650 TABLET ORAL EVERY 6 HOURS PRN
Status: DISCONTINUED | OUTPATIENT
Start: 2024-09-08 | End: 2024-09-11 | Stop reason: HOSPADM

## 2024-09-08 RX ORDER — INSULIN LISPRO 100 [IU]/ML
6 INJECTION, SOLUTION INTRAVENOUS; SUBCUTANEOUS
Status: ON HOLD | COMMUNITY

## 2024-09-08 RX ORDER — IOPAMIDOL 755 MG/ML
75 INJECTION, SOLUTION INTRAVASCULAR
Status: COMPLETED | OUTPATIENT
Start: 2024-09-08 | End: 2024-09-08

## 2024-09-08 RX ORDER — INSULIN LISPRO 100 [IU]/ML
0-8 INJECTION, SOLUTION INTRAVENOUS; SUBCUTANEOUS
Status: DISCONTINUED | OUTPATIENT
Start: 2024-09-09 | End: 2024-09-11 | Stop reason: HOSPADM

## 2024-09-08 RX ORDER — SODIUM CHLORIDE, SODIUM LACTATE, POTASSIUM CHLORIDE, AND CALCIUM CHLORIDE .6; .31; .03; .02 G/100ML; G/100ML; G/100ML; G/100ML
1000 INJECTION, SOLUTION INTRAVENOUS ONCE
Status: COMPLETED | OUTPATIENT
Start: 2024-09-08 | End: 2024-09-08

## 2024-09-08 RX ORDER — GLUCAGON 1 MG/ML
1 KIT INJECTION PRN
Status: DISCONTINUED | OUTPATIENT
Start: 2024-09-08 | End: 2024-09-11 | Stop reason: HOSPADM

## 2024-09-08 RX ORDER — INSULIN GLARGINE 100 [IU]/ML
16 INJECTION, SOLUTION SUBCUTANEOUS NIGHTLY
Status: DISCONTINUED | OUTPATIENT
Start: 2024-09-08 | End: 2024-09-11 | Stop reason: HOSPADM

## 2024-09-08 RX ORDER — SIMETHICONE 80 MG
80 TABLET,CHEWABLE ORAL 2 TIMES DAILY PRN
Status: DISCONTINUED | OUTPATIENT
Start: 2024-09-08 | End: 2024-09-11 | Stop reason: HOSPADM

## 2024-09-08 RX ORDER — SODIUM CHLORIDE 0.9 % (FLUSH) 0.9 %
5-40 SYRINGE (ML) INJECTION PRN
Status: DISCONTINUED | OUTPATIENT
Start: 2024-09-08 | End: 2024-09-11 | Stop reason: HOSPADM

## 2024-09-08 RX ORDER — DORZOLAMIDE HCL 20 MG/ML
1 SOLUTION/ DROPS OPHTHALMIC 3 TIMES DAILY
Status: DISCONTINUED | OUTPATIENT
Start: 2024-09-08 | End: 2024-09-08

## 2024-09-08 RX ORDER — ALBUTEROL SULFATE 90 UG/1
2 AEROSOL, METERED RESPIRATORY (INHALATION) EVERY 6 HOURS PRN
Status: DISCONTINUED | OUTPATIENT
Start: 2024-09-08 | End: 2024-09-11 | Stop reason: HOSPADM

## 2024-09-08 RX ORDER — FAMOTIDINE 20 MG/1
20 TABLET, FILM COATED ORAL DAILY
Status: DISCONTINUED | OUTPATIENT
Start: 2024-09-08 | End: 2024-09-11 | Stop reason: HOSPADM

## 2024-09-08 RX ORDER — ATORVASTATIN CALCIUM 20 MG/1
20 TABLET, FILM COATED ORAL DAILY
Status: DISCONTINUED | OUTPATIENT
Start: 2024-09-08 | End: 2024-09-11 | Stop reason: HOSPADM

## 2024-09-08 RX ORDER — FLUTICASONE PROPIONATE 50 MCG
1 SPRAY, SUSPENSION (ML) NASAL DAILY
Status: DISCONTINUED | OUTPATIENT
Start: 2024-09-08 | End: 2024-09-11 | Stop reason: HOSPADM

## 2024-09-08 RX ORDER — MULTIVITAMIN WITH FOLIC ACID 400 MCG
1 TABLET ORAL DAILY
Status: ON HOLD | COMMUNITY

## 2024-09-08 RX ORDER — MONTELUKAST SODIUM 10 MG/1
10 TABLET ORAL NIGHTLY
Status: DISCONTINUED | OUTPATIENT
Start: 2024-09-08 | End: 2024-09-11 | Stop reason: HOSPADM

## 2024-09-08 RX ORDER — ACETAMINOPHEN/DIPHENHYDRAMINE 500MG-25MG
2 TABLET ORAL NIGHTLY PRN
Status: ON HOLD | COMMUNITY

## 2024-09-08 RX ORDER — PREDNISOLONE ACETATE 10 MG/ML
1 SUSPENSION/ DROPS OPHTHALMIC 4 TIMES DAILY
Status: ON HOLD | COMMUNITY

## 2024-09-08 RX ORDER — SODIUM CHLORIDE 9 MG/ML
INJECTION, SOLUTION INTRAVENOUS PRN
Status: DISCONTINUED | OUTPATIENT
Start: 2024-09-08 | End: 2024-09-11 | Stop reason: HOSPADM

## 2024-09-08 RX ORDER — INSULIN LISPRO 100 [IU]/ML
0-4 INJECTION, SOLUTION INTRAVENOUS; SUBCUTANEOUS NIGHTLY
Status: DISCONTINUED | OUTPATIENT
Start: 2024-09-08 | End: 2024-09-11 | Stop reason: HOSPADM

## 2024-09-08 RX ORDER — SODIUM CHLORIDE 0.9 % (FLUSH) 0.9 %
5-40 SYRINGE (ML) INJECTION EVERY 12 HOURS SCHEDULED
Status: DISCONTINUED | OUTPATIENT
Start: 2024-09-08 | End: 2024-09-11 | Stop reason: HOSPADM

## 2024-09-08 RX ORDER — ALBUTEROL SULFATE 0.83 MG/ML
2.5 SOLUTION RESPIRATORY (INHALATION) 3 TIMES DAILY PRN
Status: DISCONTINUED | OUTPATIENT
Start: 2024-09-08 | End: 2024-09-11 | Stop reason: HOSPADM

## 2024-09-08 RX ORDER — DEXTROSE MONOHYDRATE 100 MG/ML
INJECTION, SOLUTION INTRAVENOUS CONTINUOUS PRN
Status: DISCONTINUED | OUTPATIENT
Start: 2024-09-08 | End: 2024-09-11 | Stop reason: HOSPADM

## 2024-09-08 RX ORDER — POLYETHYLENE GLYCOL 3350 17 G/17G
17 POWDER, FOR SOLUTION ORAL DAILY PRN
Status: DISCONTINUED | OUTPATIENT
Start: 2024-09-08 | End: 2024-09-11 | Stop reason: HOSPADM

## 2024-09-08 RX ORDER — PILOCARPINE HYDROCHLORIDE 20 MG/ML
1 SOLUTION/ DROPS OPHTHALMIC 3 TIMES DAILY
Status: DISCONTINUED | OUTPATIENT
Start: 2024-09-08 | End: 2024-09-11 | Stop reason: HOSPADM

## 2024-09-08 RX ORDER — ACETAMINOPHEN 650 MG/1
650 SUPPOSITORY RECTAL EVERY 6 HOURS PRN
Status: DISCONTINUED | OUTPATIENT
Start: 2024-09-08 | End: 2024-09-11 | Stop reason: HOSPADM

## 2024-09-08 RX ADMIN — INSULIN GLARGINE 16 UNITS: 100 INJECTION, SOLUTION SUBCUTANEOUS at 21:58

## 2024-09-08 RX ADMIN — PILOCARPINE HYDROCHLORIDE 1 DROP: 20 SOLUTION/ DROPS OPHTHALMIC at 20:49

## 2024-09-08 RX ADMIN — SODIUM CHLORIDE, POTASSIUM CHLORIDE, SODIUM LACTATE AND CALCIUM CHLORIDE 1000 ML: 600; 310; 30; 20 INJECTION, SOLUTION INTRAVENOUS at 12:54

## 2024-09-08 RX ADMIN — MONTELUKAST SODIUM 10 MG: 10 TABLET, FILM COATED ORAL at 20:49

## 2024-09-08 RX ADMIN — SODIUM CHLORIDE, PRESERVATIVE FREE 10 ML: 5 INJECTION INTRAVENOUS at 20:49

## 2024-09-08 RX ADMIN — IOPAMIDOL 75 ML: 755 INJECTION, SOLUTION INTRAVENOUS at 13:15

## 2024-09-08 RX ADMIN — FAMOTIDINE 20 MG: 20 TABLET, FILM COATED ORAL at 20:49

## 2024-09-08 RX ADMIN — METOPROLOL TARTRATE 25 MG: 25 TABLET, FILM COATED ORAL at 22:01

## 2024-09-08 RX ADMIN — ATORVASTATIN CALCIUM 20 MG: 20 TABLET, FILM COATED ORAL at 20:49

## 2024-09-08 RX ADMIN — SERTRALINE 50 MG: 50 TABLET, FILM COATED ORAL at 20:49

## 2024-09-08 ASSESSMENT — PAIN SCALES - GENERAL
PAINLEVEL_OUTOF10: 0
PAINLEVEL_OUTOF10: 0

## 2024-09-09 LAB
ANION GAP SERPL CALCULATED.3IONS-SCNC: 11 MMOL/L (ref 3–16)
BASOPHILS # BLD: 0.1 K/UL (ref 0–0.2)
BASOPHILS NFR BLD: 0.8 %
BUN SERPL-MCNC: 18 MG/DL (ref 7–20)
CALCIUM SERPL-MCNC: 8.7 MG/DL (ref 8.3–10.6)
CHLORIDE SERPL-SCNC: 102 MMOL/L (ref 99–110)
CO2 SERPL-SCNC: 25 MMOL/L (ref 21–32)
CREAT SERPL-MCNC: 0.8 MG/DL (ref 0.6–1.2)
DEPRECATED RDW RBC AUTO: 14.9 % (ref 12.4–15.4)
EOSINOPHIL # BLD: 0.1 K/UL (ref 0–0.6)
EOSINOPHIL NFR BLD: 0.9 %
FOLATE SERPL-MCNC: 32.2 NG/ML (ref 4.78–24.2)
GFR SERPLBLD CREATININE-BSD FMLA CKD-EPI: 76 ML/MIN/{1.73_M2}
GLUCOSE BLD-MCNC: 159 MG/DL (ref 70–99)
GLUCOSE BLD-MCNC: 179 MG/DL (ref 70–99)
GLUCOSE BLD-MCNC: 204 MG/DL (ref 70–99)
GLUCOSE BLD-MCNC: 350 MG/DL (ref 70–99)
GLUCOSE SERPL-MCNC: 135 MG/DL (ref 70–99)
HCT VFR BLD AUTO: 24.9 % (ref 36–48)
HCT VFR BLD AUTO: 25.4 % (ref 36–48)
HCT VFR BLD AUTO: 25.8 % (ref 36–48)
HCT VFR BLD AUTO: 26.3 % (ref 36–48)
HGB BLD-MCNC: 8.1 G/DL (ref 12–16)
HGB BLD-MCNC: 8.2 G/DL (ref 12–16)
HGB BLD-MCNC: 8.4 G/DL (ref 12–16)
HGB BLD-MCNC: 8.7 G/DL (ref 12–16)
IRON SATN MFR SERPL: 44 % (ref 15–50)
IRON SERPL-MCNC: 94 UG/DL (ref 37–145)
LYMPHOCYTES # BLD: 3.1 K/UL (ref 1–5.1)
LYMPHOCYTES NFR BLD: 34.9 %
MCH RBC QN AUTO: 28.1 PG (ref 26–34)
MCHC RBC AUTO-ENTMCNC: 32.9 G/DL (ref 31–36)
MCV RBC AUTO: 85.5 FL (ref 80–100)
MONOCYTES # BLD: 0.6 K/UL (ref 0–1.3)
MONOCYTES NFR BLD: 7.1 %
NEUTROPHILS # BLD: 5 K/UL (ref 1.7–7.7)
NEUTROPHILS NFR BLD: 56.3 %
PERFORMED ON: ABNORMAL
PLATELET # BLD AUTO: 258 K/UL (ref 135–450)
PMV BLD AUTO: 7.8 FL (ref 5–10.5)
POTASSIUM SERPL-SCNC: 3.9 MMOL/L (ref 3.5–5.1)
RBC # BLD AUTO: 3.08 M/UL (ref 4–5.2)
SODIUM SERPL-SCNC: 138 MMOL/L (ref 136–145)
TIBC SERPL-MCNC: 213 UG/DL (ref 260–445)
VIT B12 SERPL-MCNC: 436 PG/ML (ref 211–911)
WBC # BLD AUTO: 8.8 K/UL (ref 4–11)

## 2024-09-09 PROCEDURE — 85014 HEMATOCRIT: CPT

## 2024-09-09 PROCEDURE — 97162 PT EVAL MOD COMPLEX 30 MIN: CPT

## 2024-09-09 PROCEDURE — 83540 ASSAY OF IRON: CPT

## 2024-09-09 PROCEDURE — 97535 SELF CARE MNGMENT TRAINING: CPT

## 2024-09-09 PROCEDURE — 97116 GAIT TRAINING THERAPY: CPT

## 2024-09-09 PROCEDURE — 80048 BASIC METABOLIC PNL TOTAL CA: CPT

## 2024-09-09 PROCEDURE — 97530 THERAPEUTIC ACTIVITIES: CPT

## 2024-09-09 PROCEDURE — 83550 IRON BINDING TEST: CPT

## 2024-09-09 PROCEDURE — 6370000000 HC RX 637 (ALT 250 FOR IP): Performed by: PHYSICIAN ASSISTANT

## 2024-09-09 PROCEDURE — 82746 ASSAY OF FOLIC ACID SERUM: CPT

## 2024-09-09 PROCEDURE — 6370000000 HC RX 637 (ALT 250 FOR IP): Performed by: INTERNAL MEDICINE

## 2024-09-09 PROCEDURE — 36415 COLL VENOUS BLD VENIPUNCTURE: CPT

## 2024-09-09 PROCEDURE — 85018 HEMOGLOBIN: CPT

## 2024-09-09 PROCEDURE — 85025 COMPLETE CBC W/AUTO DIFF WBC: CPT

## 2024-09-09 PROCEDURE — 82607 VITAMIN B-12: CPT

## 2024-09-09 PROCEDURE — 2580000003 HC RX 258: Performed by: INTERNAL MEDICINE

## 2024-09-09 PROCEDURE — 97166 OT EVAL MOD COMPLEX 45 MIN: CPT

## 2024-09-09 PROCEDURE — 6360000002 HC RX W HCPCS: Performed by: NURSE PRACTITIONER

## 2024-09-09 PROCEDURE — 2060000000 HC ICU INTERMEDIATE R&B

## 2024-09-09 RX ORDER — PEG-3350, SODIUM SULFATE, SODIUM CHLORIDE, POTASSIUM CHLORIDE, SODIUM ASCORBATE AND ASCORBIC ACID 7.5-2.691G
100 KIT ORAL ONCE
Status: COMPLETED | OUTPATIENT
Start: 2024-09-09 | End: 2024-09-09

## 2024-09-09 RX ORDER — DIPHENHYDRAMINE HYDROCHLORIDE 50 MG/ML
25 INJECTION INTRAMUSCULAR; INTRAVENOUS
Status: COMPLETED | OUTPATIENT
Start: 2024-09-09 | End: 2024-09-09

## 2024-09-09 RX ADMIN — DIPHENHYDRAMINE HYDROCHLORIDE 25 MG: 50 INJECTION INTRAMUSCULAR; INTRAVENOUS at 01:15

## 2024-09-09 RX ADMIN — INSULIN LISPRO 8 UNITS: 100 INJECTION, SOLUTION INTRAVENOUS; SUBCUTANEOUS at 17:16

## 2024-09-09 RX ADMIN — ATORVASTATIN CALCIUM 20 MG: 20 TABLET, FILM COATED ORAL at 08:11

## 2024-09-09 RX ADMIN — SODIUM CHLORIDE, PRESERVATIVE FREE 10 ML: 5 INJECTION INTRAVENOUS at 08:12

## 2024-09-09 RX ADMIN — MONTELUKAST SODIUM 10 MG: 10 TABLET, FILM COATED ORAL at 19:38

## 2024-09-09 RX ADMIN — METOPROLOL TARTRATE 25 MG: 25 TABLET, FILM COATED ORAL at 19:38

## 2024-09-09 RX ADMIN — INSULIN LISPRO 2 UNITS: 100 INJECTION, SOLUTION INTRAVENOUS; SUBCUTANEOUS at 12:04

## 2024-09-09 RX ADMIN — METOPROLOL TARTRATE 25 MG: 25 TABLET, FILM COATED ORAL at 08:11

## 2024-09-09 RX ADMIN — INSULIN GLARGINE 16 UNITS: 100 INJECTION, SOLUTION SUBCUTANEOUS at 20:21

## 2024-09-09 RX ADMIN — PEG-3350, SODIUM SULFATE, SODIUM CHLORIDE, POTASSIUM CHLORIDE, SODIUM ASCORBATE AND ASCORBIC ACID 100 G: KIT at 16:49

## 2024-09-09 RX ADMIN — PILOCARPINE HYDROCHLORIDE 1 DROP: 20 SOLUTION/ DROPS OPHTHALMIC at 19:13

## 2024-09-09 RX ADMIN — PILOCARPINE HYDROCHLORIDE 1 DROP: 20 SOLUTION/ DROPS OPHTHALMIC at 08:12

## 2024-09-09 RX ADMIN — PILOCARPINE HYDROCHLORIDE 1 DROP: 20 SOLUTION/ DROPS OPHTHALMIC at 16:52

## 2024-09-09 RX ADMIN — SODIUM CHLORIDE, PRESERVATIVE FREE 10 ML: 5 INJECTION INTRAVENOUS at 19:38

## 2024-09-09 RX ADMIN — FAMOTIDINE 20 MG: 20 TABLET, FILM COATED ORAL at 08:11

## 2024-09-09 RX ADMIN — SERTRALINE 50 MG: 50 TABLET, FILM COATED ORAL at 08:11

## 2024-09-09 RX ADMIN — FLUTICASONE PROPIONATE 1 SPRAY: 50 SPRAY, METERED NASAL at 08:11

## 2024-09-09 RX ADMIN — POLYETHYLENE GLYCOL 3350, SODIUM SULFATE, SODIUM CHLORIDE, POTASSIUM CHLORIDE, ASCORBIC ACID, SODIUM ASCORBATE 100 G: KIT at 20:21

## 2024-09-09 RX ADMIN — SODIUM CHLORIDE, PRESERVATIVE FREE 10 ML: 5 INJECTION INTRAVENOUS at 01:15

## 2024-09-09 ASSESSMENT — PAIN SCALES - GENERAL
PAINLEVEL_OUTOF10: 0

## 2024-09-10 ENCOUNTER — ANESTHESIA EVENT (OUTPATIENT)
Dept: ENDOSCOPY | Age: 74
DRG: 378 | End: 2024-09-10
Payer: COMMERCIAL

## 2024-09-10 ENCOUNTER — ANESTHESIA (OUTPATIENT)
Dept: ENDOSCOPY | Age: 74
DRG: 378 | End: 2024-09-10
Payer: COMMERCIAL

## 2024-09-10 LAB
ANION GAP SERPL CALCULATED.3IONS-SCNC: 8 MMOL/L (ref 3–16)
BASOPHILS # BLD: 0.1 K/UL (ref 0–0.2)
BASOPHILS NFR BLD: 0.7 %
BLOOD BANK DISPENSE STATUS: NORMAL
BLOOD BANK PRODUCT CODE: NORMAL
BPU ID: NORMAL
BUN SERPL-MCNC: 12 MG/DL (ref 7–20)
CALCIUM SERPL-MCNC: 8.4 MG/DL (ref 8.3–10.6)
CHLORIDE SERPL-SCNC: 106 MMOL/L (ref 99–110)
CO2 SERPL-SCNC: 27 MMOL/L (ref 21–32)
CREAT SERPL-MCNC: 0.8 MG/DL (ref 0.6–1.2)
DEPRECATED RDW RBC AUTO: 14.9 % (ref 12.4–15.4)
DESCRIPTION BLOOD BANK: NORMAL
EOSINOPHIL # BLD: 0.1 K/UL (ref 0–0.6)
EOSINOPHIL NFR BLD: 1 %
GFR SERPLBLD CREATININE-BSD FMLA CKD-EPI: 76 ML/MIN/{1.73_M2}
GLUCOSE BLD-MCNC: 145 MG/DL (ref 70–99)
GLUCOSE BLD-MCNC: 168 MG/DL (ref 70–99)
GLUCOSE BLD-MCNC: 252 MG/DL (ref 70–99)
GLUCOSE BLD-MCNC: 354 MG/DL (ref 70–99)
GLUCOSE SERPL-MCNC: 153 MG/DL (ref 70–99)
HCT VFR BLD AUTO: 21 % (ref 36–48)
HCT VFR BLD AUTO: 21.6 % (ref 36–48)
HCT VFR BLD AUTO: 23.9 % (ref 36–48)
HCT VFR BLD AUTO: 24.4 % (ref 36–48)
HGB BLD-MCNC: 6.9 G/DL (ref 12–16)
HGB BLD-MCNC: 7.2 G/DL (ref 12–16)
HGB BLD-MCNC: 7.8 G/DL (ref 12–16)
HGB BLD-MCNC: 8 G/DL (ref 12–16)
LYMPHOCYTES # BLD: 3.3 K/UL (ref 1–5.1)
LYMPHOCYTES NFR BLD: 29.1 %
MCH RBC QN AUTO: 27.8 PG (ref 26–34)
MCHC RBC AUTO-ENTMCNC: 32.7 G/DL (ref 31–36)
MCV RBC AUTO: 85.1 FL (ref 80–100)
MONOCYTES # BLD: 0.8 K/UL (ref 0–1.3)
MONOCYTES NFR BLD: 6.9 %
NEUTROPHILS # BLD: 7 K/UL (ref 1.7–7.7)
NEUTROPHILS NFR BLD: 62.3 %
PERFORMED ON: ABNORMAL
PLATELET # BLD AUTO: 238 K/UL (ref 135–450)
PMV BLD AUTO: 8.1 FL (ref 5–10.5)
POTASSIUM SERPL-SCNC: 3.8 MMOL/L (ref 3.5–5.1)
RBC # BLD AUTO: 2.47 M/UL (ref 4–5.2)
SODIUM SERPL-SCNC: 141 MMOL/L (ref 136–145)
WBC # BLD AUTO: 11.2 K/UL (ref 4–11)

## 2024-09-10 PROCEDURE — 2580000003 HC RX 258: Performed by: INTERNAL MEDICINE

## 2024-09-10 PROCEDURE — 88305 TISSUE EXAM BY PATHOLOGIST: CPT

## 2024-09-10 PROCEDURE — 85025 COMPLETE CBC W/AUTO DIFF WBC: CPT

## 2024-09-10 PROCEDURE — 85014 HEMATOCRIT: CPT

## 2024-09-10 PROCEDURE — 80048 BASIC METABOLIC PNL TOTAL CA: CPT

## 2024-09-10 PROCEDURE — 3700000001 HC ADD 15 MINUTES (ANESTHESIA): Performed by: INTERNAL MEDICINE

## 2024-09-10 PROCEDURE — 3609010600 HC COLONOSCOPY POLYPECTOMY SNARE/COLD BIOPSY: Performed by: INTERNAL MEDICINE

## 2024-09-10 PROCEDURE — 2580000003 HC RX 258: Performed by: NURSE ANESTHETIST, CERTIFIED REGISTERED

## 2024-09-10 PROCEDURE — 2709999900 HC NON-CHARGEABLE SUPPLY: Performed by: INTERNAL MEDICINE

## 2024-09-10 PROCEDURE — 2500000003 HC RX 250 WO HCPCS: Performed by: NURSE ANESTHETIST, CERTIFIED REGISTERED

## 2024-09-10 PROCEDURE — 6360000002 HC RX W HCPCS: Performed by: NURSE ANESTHETIST, CERTIFIED REGISTERED

## 2024-09-10 PROCEDURE — 7100000000 HC PACU RECOVERY - FIRST 15 MIN: Performed by: INTERNAL MEDICINE

## 2024-09-10 PROCEDURE — 36415 COLL VENOUS BLD VENIPUNCTURE: CPT

## 2024-09-10 PROCEDURE — 7100000001 HC PACU RECOVERY - ADDTL 15 MIN: Performed by: INTERNAL MEDICINE

## 2024-09-10 PROCEDURE — 2060000000 HC ICU INTERMEDIATE R&B

## 2024-09-10 PROCEDURE — 3700000000 HC ANESTHESIA ATTENDED CARE: Performed by: INTERNAL MEDICINE

## 2024-09-10 PROCEDURE — 85018 HEMOGLOBIN: CPT

## 2024-09-10 PROCEDURE — 0DBP8ZX EXCISION OF RECTUM, VIA NATURAL OR ARTIFICIAL OPENING ENDOSCOPIC, DIAGNOSTIC: ICD-10-PCS | Performed by: INTERNAL MEDICINE

## 2024-09-10 PROCEDURE — 6370000000 HC RX 637 (ALT 250 FOR IP): Performed by: INTERNAL MEDICINE

## 2024-09-10 PROCEDURE — 36430 TRANSFUSION BLD/BLD COMPNT: CPT

## 2024-09-10 RX ORDER — PROPOFOL 10 MG/ML
INJECTION, EMULSION INTRAVENOUS PRN
Status: DISCONTINUED | OUTPATIENT
Start: 2024-09-10 | End: 2024-09-10 | Stop reason: SDUPTHER

## 2024-09-10 RX ORDER — SODIUM CHLORIDE 9 MG/ML
INJECTION, SOLUTION INTRAVENOUS PRN
Status: DISCONTINUED | OUTPATIENT
Start: 2024-09-10 | End: 2024-09-11 | Stop reason: HOSPADM

## 2024-09-10 RX ORDER — PROPOFOL 10 MG/ML
INJECTION, EMULSION INTRAVENOUS CONTINUOUS PRN
Status: DISCONTINUED | OUTPATIENT
Start: 2024-09-10 | End: 2024-09-10 | Stop reason: SDUPTHER

## 2024-09-10 RX ORDER — LIDOCAINE HYDROCHLORIDE 20 MG/ML
INJECTION, SOLUTION EPIDURAL; INFILTRATION; INTRACAUDAL; PERINEURAL PRN
Status: DISCONTINUED | OUTPATIENT
Start: 2024-09-10 | End: 2024-09-10 | Stop reason: SDUPTHER

## 2024-09-10 RX ORDER — SODIUM CHLORIDE 9 MG/ML
INJECTION, SOLUTION INTRAVENOUS CONTINUOUS PRN
Status: DISCONTINUED | OUTPATIENT
Start: 2024-09-10 | End: 2024-09-10 | Stop reason: SDUPTHER

## 2024-09-10 RX ADMIN — PROPOFOL 120 MCG/KG/MIN: 10 INJECTION, EMULSION INTRAVENOUS at 07:53

## 2024-09-10 RX ADMIN — PILOCARPINE HYDROCHLORIDE 1 DROP: 20 SOLUTION/ DROPS OPHTHALMIC at 15:50

## 2024-09-10 RX ADMIN — METOPROLOL TARTRATE 25 MG: 25 TABLET, FILM COATED ORAL at 10:15

## 2024-09-10 RX ADMIN — SODIUM CHLORIDE: 9 INJECTION, SOLUTION INTRAVENOUS at 07:50

## 2024-09-10 RX ADMIN — FAMOTIDINE 20 MG: 20 TABLET, FILM COATED ORAL at 10:15

## 2024-09-10 RX ADMIN — ATORVASTATIN CALCIUM 20 MG: 20 TABLET, FILM COATED ORAL at 10:15

## 2024-09-10 RX ADMIN — INSULIN GLARGINE 16 UNITS: 100 INJECTION, SOLUTION SUBCUTANEOUS at 20:10

## 2024-09-10 RX ADMIN — PILOCARPINE HYDROCHLORIDE 1 DROP: 20 SOLUTION/ DROPS OPHTHALMIC at 20:15

## 2024-09-10 RX ADMIN — INSULIN LISPRO 8 UNITS: 100 INJECTION, SOLUTION INTRAVENOUS; SUBCUTANEOUS at 16:59

## 2024-09-10 RX ADMIN — PROPOFOL 60 MG: 10 INJECTION, EMULSION INTRAVENOUS at 07:53

## 2024-09-10 RX ADMIN — MONTELUKAST SODIUM 10 MG: 10 TABLET, FILM COATED ORAL at 20:10

## 2024-09-10 RX ADMIN — SODIUM CHLORIDE, PRESERVATIVE FREE 10 ML: 5 INJECTION INTRAVENOUS at 20:10

## 2024-09-10 RX ADMIN — METOPROLOL TARTRATE 25 MG: 25 TABLET, FILM COATED ORAL at 20:10

## 2024-09-10 RX ADMIN — LIDOCAINE HYDROCHLORIDE 50 MG: 20 INJECTION, SOLUTION EPIDURAL; INFILTRATION; INTRACAUDAL; PERINEURAL at 07:53

## 2024-09-10 RX ADMIN — SODIUM CHLORIDE, PRESERVATIVE FREE 10 ML: 5 INJECTION INTRAVENOUS at 10:19

## 2024-09-10 RX ADMIN — PILOCARPINE HYDROCHLORIDE 1 DROP: 20 SOLUTION/ DROPS OPHTHALMIC at 10:16

## 2024-09-10 RX ADMIN — SERTRALINE 50 MG: 50 TABLET, FILM COATED ORAL at 10:16

## 2024-09-10 RX ADMIN — PHENYLEPHRINE HYDROCHLORIDE 100 MCG: 10 INJECTION INTRAVENOUS at 08:11

## 2024-09-10 RX ADMIN — FLUTICASONE PROPIONATE 1 SPRAY: 50 SPRAY, METERED NASAL at 10:16

## 2024-09-10 RX ADMIN — PHENYLEPHRINE HYDROCHLORIDE 200 MCG: 10 INJECTION INTRAVENOUS at 08:19

## 2024-09-10 ASSESSMENT — ENCOUNTER SYMPTOMS: SHORTNESS OF BREATH: 0

## 2024-09-10 ASSESSMENT — PAIN - FUNCTIONAL ASSESSMENT
PAIN_FUNCTIONAL_ASSESSMENT: NONE - DENIES PAIN

## 2024-09-11 VITALS
TEMPERATURE: 98.2 F | DIASTOLIC BLOOD PRESSURE: 61 MMHG | SYSTOLIC BLOOD PRESSURE: 116 MMHG | HEIGHT: 61 IN | RESPIRATION RATE: 16 BRPM | HEART RATE: 74 BPM | WEIGHT: 225.97 LBS | BODY MASS INDEX: 42.66 KG/M2 | OXYGEN SATURATION: 98 %

## 2024-09-11 LAB
ANION GAP SERPL CALCULATED.3IONS-SCNC: 8 MMOL/L (ref 3–16)
BASOPHILS # BLD: 0.1 K/UL (ref 0–0.2)
BASOPHILS NFR BLD: 1 %
BUN SERPL-MCNC: 10 MG/DL (ref 7–20)
CALCIUM SERPL-MCNC: 8 MG/DL (ref 8.3–10.6)
CHLORIDE SERPL-SCNC: 108 MMOL/L (ref 99–110)
CO2 SERPL-SCNC: 24 MMOL/L (ref 21–32)
CREAT SERPL-MCNC: 0.8 MG/DL (ref 0.6–1.2)
DEPRECATED RDW RBC AUTO: 15 % (ref 12.4–15.4)
EOSINOPHIL # BLD: 0.3 K/UL (ref 0–0.6)
EOSINOPHIL NFR BLD: 2.6 %
GFR SERPLBLD CREATININE-BSD FMLA CKD-EPI: 76 ML/MIN/{1.73_M2}
GLUCOSE BLD-MCNC: 210 MG/DL (ref 70–99)
GLUCOSE SERPL-MCNC: 224 MG/DL (ref 70–99)
HCT VFR BLD AUTO: 23 % (ref 36–48)
HCT VFR BLD AUTO: 23.7 % (ref 36–48)
HGB BLD-MCNC: 7.5 G/DL (ref 12–16)
HGB BLD-MCNC: 7.8 G/DL (ref 12–16)
LYMPHOCYTES # BLD: 2.8 K/UL (ref 1–5.1)
LYMPHOCYTES NFR BLD: 27 %
MCH RBC QN AUTO: 28.5 PG (ref 26–34)
MCHC RBC AUTO-ENTMCNC: 33 G/DL (ref 31–36)
MCV RBC AUTO: 86.5 FL (ref 80–100)
MONOCYTES # BLD: 0.7 K/UL (ref 0–1.3)
MONOCYTES NFR BLD: 6.5 %
NEUTROPHILS # BLD: 6.5 K/UL (ref 1.7–7.7)
NEUTROPHILS NFR BLD: 62.9 %
PERFORMED ON: ABNORMAL
PLATELET # BLD AUTO: 224 K/UL (ref 135–450)
PMV BLD AUTO: 8.3 FL (ref 5–10.5)
POTASSIUM SERPL-SCNC: 3.9 MMOL/L (ref 3.5–5.1)
RBC # BLD AUTO: 2.74 M/UL (ref 4–5.2)
SODIUM SERPL-SCNC: 140 MMOL/L (ref 136–145)
WBC # BLD AUTO: 10.3 K/UL (ref 4–11)

## 2024-09-11 PROCEDURE — 85025 COMPLETE CBC W/AUTO DIFF WBC: CPT

## 2024-09-11 PROCEDURE — 6370000000 HC RX 637 (ALT 250 FOR IP): Performed by: INTERNAL MEDICINE

## 2024-09-11 PROCEDURE — 2580000003 HC RX 258: Performed by: INTERNAL MEDICINE

## 2024-09-11 PROCEDURE — 85014 HEMATOCRIT: CPT

## 2024-09-11 PROCEDURE — 36415 COLL VENOUS BLD VENIPUNCTURE: CPT

## 2024-09-11 PROCEDURE — 85018 HEMOGLOBIN: CPT

## 2024-09-11 PROCEDURE — 80048 BASIC METABOLIC PNL TOTAL CA: CPT

## 2024-09-11 RX ORDER — DIPHENHYDRAMINE HCL 25 MG
25 TABLET ORAL ONCE
Status: COMPLETED | OUTPATIENT
Start: 2024-09-11 | End: 2024-09-11

## 2024-09-11 RX ORDER — SENNA AND DOCUSATE SODIUM 50; 8.6 MG/1; MG/1
1 TABLET, FILM COATED ORAL DAILY
Qty: 30 TABLET | Refills: 1 | Status: ON HOLD | OUTPATIENT
Start: 2024-09-11 | End: 2024-11-10

## 2024-09-11 RX ADMIN — INSULIN LISPRO 2 UNITS: 100 INJECTION, SOLUTION INTRAVENOUS; SUBCUTANEOUS at 12:22

## 2024-09-11 RX ADMIN — ATORVASTATIN CALCIUM 20 MG: 20 TABLET, FILM COATED ORAL at 09:03

## 2024-09-11 RX ADMIN — METOPROLOL TARTRATE 25 MG: 25 TABLET, FILM COATED ORAL at 09:03

## 2024-09-11 RX ADMIN — SERTRALINE 50 MG: 50 TABLET, FILM COATED ORAL at 09:02

## 2024-09-11 RX ADMIN — SODIUM CHLORIDE, PRESERVATIVE FREE 10 ML: 5 INJECTION INTRAVENOUS at 09:05

## 2024-09-11 RX ADMIN — DIPHENHYDRAMINE HYDROCHLORIDE 25 MG: 25 TABLET ORAL at 09:02

## 2024-09-11 RX ADMIN — FAMOTIDINE 20 MG: 20 TABLET, FILM COATED ORAL at 09:03

## 2024-09-14 ENCOUNTER — APPOINTMENT (OUTPATIENT)
Dept: GENERAL RADIOLOGY | Age: 74
DRG: 378 | End: 2024-09-14
Payer: COMMERCIAL

## 2024-09-14 ENCOUNTER — HOSPITAL ENCOUNTER (INPATIENT)
Age: 74
LOS: 5 days | Discharge: HOME HEALTH | DRG: 378 | End: 2024-09-20
Attending: EMERGENCY MEDICINE | Admitting: INTERNAL MEDICINE
Payer: COMMERCIAL

## 2024-09-14 DIAGNOSIS — D50.0 ANEMIA, BLOOD LOSS: Primary | ICD-10-CM

## 2024-09-14 DIAGNOSIS — R06.00 DYSPNEA, UNSPECIFIED TYPE: ICD-10-CM

## 2024-09-14 DIAGNOSIS — K62.5 BRBPR (BRIGHT RED BLOOD PER RECTUM): ICD-10-CM

## 2024-09-14 LAB
ABO + RH BLD: NORMAL
ALBUMIN SERPL-MCNC: 3.2 G/DL (ref 3.4–5)
ALBUMIN/GLOB SERPL: 1.5 {RATIO} (ref 1.1–2.2)
ALP SERPL-CCNC: 47 U/L (ref 40–129)
ALT SERPL-CCNC: 8 U/L (ref 10–40)
ANION GAP SERPL CALCULATED.3IONS-SCNC: 12 MMOL/L (ref 3–16)
ANISOCYTOSIS BLD QL SMEAR: ABNORMAL
AST SERPL-CCNC: 11 U/L (ref 15–37)
BASOPHILS # BLD: 0.1 K/UL (ref 0–0.2)
BASOPHILS NFR BLD: 0.7 %
BILIRUB SERPL-MCNC: 0.3 MG/DL (ref 0–1)
BILIRUB UR QL STRIP.AUTO: NEGATIVE
BLD GP AB SCN SERPL QL: NORMAL
BLOOD BANK DISPENSE STATUS: NORMAL
BLOOD BANK PRODUCT CODE: NORMAL
BPU ID: NORMAL
BUN SERPL-MCNC: 19 MG/DL (ref 7–20)
CALCIUM SERPL-MCNC: 8 MG/DL (ref 8.3–10.6)
CHLORIDE SERPL-SCNC: 104 MMOL/L (ref 99–110)
CLARITY UR: NORMAL
CO2 SERPL-SCNC: 24 MMOL/L (ref 21–32)
COLOR UR: YELLOW
CREAT SERPL-MCNC: 1 MG/DL (ref 0.6–1.2)
DEPRECATED RDW RBC AUTO: 16.6 % (ref 12.4–15.4)
DESCRIPTION BLOOD BANK: NORMAL
EOSINOPHIL # BLD: 0.1 K/UL (ref 0–0.6)
EOSINOPHIL NFR BLD: 0.4 %
FLUAV RNA RESP QL NAA+PROBE: NOT DETECTED
FLUBV RNA RESP QL NAA+PROBE: NOT DETECTED
GFR SERPLBLD CREATININE-BSD FMLA CKD-EPI: 58 ML/MIN/{1.73_M2}
GLUCOSE SERPL-MCNC: 228 MG/DL (ref 70–99)
GLUCOSE UR STRIP.AUTO-MCNC: NEGATIVE MG/DL
HCT VFR BLD AUTO: 11.9 % (ref 36–48)
HGB BLD-MCNC: 3.9 G/DL (ref 12–16)
HGB UR QL STRIP.AUTO: NEGATIVE
KETONES UR STRIP.AUTO-MCNC: NEGATIVE MG/DL
LACTATE BLDV-SCNC: 2.8 MMOL/L (ref 0.4–1.9)
LACTATE BLDV-SCNC: 3.7 MMOL/L (ref 0.4–1.9)
LEUKOCYTE ESTERASE UR QL STRIP.AUTO: NEGATIVE
LYMPHOCYTES # BLD: 2.7 K/UL (ref 1–5.1)
LYMPHOCYTES NFR BLD: 19.8 %
MAGNESIUM SERPL-MCNC: 1.7 MG/DL (ref 1.8–2.4)
MCH RBC QN AUTO: 29.6 PG (ref 26–34)
MCHC RBC AUTO-ENTMCNC: 32.9 G/DL (ref 31–36)
MCV RBC AUTO: 90.1 FL (ref 80–100)
MONOCYTES # BLD: 0.8 K/UL (ref 0–1.3)
MONOCYTES NFR BLD: 5.7 %
NEUTROPHILS # BLD: 9.9 K/UL (ref 1.7–7.7)
NEUTROPHILS NFR BLD: 73.4 %
NITRITE UR QL STRIP.AUTO: NEGATIVE
PATH INTERP BLD-IMP: YES
PH UR STRIP.AUTO: 5 [PH] (ref 5–8)
PLATELET # BLD AUTO: 289 K/UL (ref 135–450)
PLATELET BLD QL SMEAR: ADEQUATE
PMV BLD AUTO: 7.3 FL (ref 5–10.5)
POLYCHROMASIA BLD QL SMEAR: ABNORMAL
POTASSIUM SERPL-SCNC: 3.5 MMOL/L (ref 3.5–5.1)
PROCALCITONIN SERPL IA-MCNC: 0.08 NG/ML (ref 0–0.15)
PROT SERPL-MCNC: 5.3 G/DL (ref 6.4–8.2)
PROT UR STRIP.AUTO-MCNC: NEGATIVE MG/DL
RBC # BLD AUTO: 1.32 M/UL (ref 4–5.2)
ROULEAUX BLD QL SMEAR: ABNORMAL
SARS-COV-2 RNA RESP QL NAA+PROBE: NOT DETECTED
SLIDE REVIEW: ABNORMAL
SODIUM SERPL-SCNC: 140 MMOL/L (ref 136–145)
SP GR UR STRIP.AUTO: 1.02 (ref 1–1.03)
TROPONIN, HIGH SENSITIVITY: 13 NG/L (ref 0–14)
TROPONIN, HIGH SENSITIVITY: 13 NG/L (ref 0–14)
UA COMPLETE W REFLEX CULTURE PNL UR: NORMAL
UA DIPSTICK W REFLEX MICRO PNL UR: NORMAL
URN SPEC COLLECT METH UR: NORMAL
UROBILINOGEN UR STRIP-ACNC: 0.2 E.U./DL
WBC # BLD AUTO: 13.5 K/UL (ref 4–11)

## 2024-09-14 PROCEDURE — 30233N1 TRANSFUSION OF NONAUTOLOGOUS RED BLOOD CELLS INTO PERIPHERAL VEIN, PERCUTANEOUS APPROACH: ICD-10-PCS | Performed by: INTERNAL MEDICINE

## 2024-09-14 PROCEDURE — 6360000002 HC RX W HCPCS: Performed by: EMERGENCY MEDICINE

## 2024-09-14 PROCEDURE — 86850 RBC ANTIBODY SCREEN: CPT

## 2024-09-14 PROCEDURE — 85025 COMPLETE CBC W/AUTO DIFF WBC: CPT

## 2024-09-14 PROCEDURE — 87636 SARSCOV2 & INF A&B AMP PRB: CPT

## 2024-09-14 PROCEDURE — 86923 COMPATIBILITY TEST ELECTRIC: CPT

## 2024-09-14 PROCEDURE — 83735 ASSAY OF MAGNESIUM: CPT

## 2024-09-14 PROCEDURE — 2580000003 HC RX 258: Performed by: EMERGENCY MEDICINE

## 2024-09-14 PROCEDURE — 80053 COMPREHEN METABOLIC PANEL: CPT

## 2024-09-14 PROCEDURE — 96365 THER/PROPH/DIAG IV INF INIT: CPT

## 2024-09-14 PROCEDURE — 93005 ELECTROCARDIOGRAM TRACING: CPT | Performed by: EMERGENCY MEDICINE

## 2024-09-14 PROCEDURE — 71045 X-RAY EXAM CHEST 1 VIEW: CPT

## 2024-09-14 PROCEDURE — 86901 BLOOD TYPING SEROLOGIC RH(D): CPT

## 2024-09-14 PROCEDURE — 6370000000 HC RX 637 (ALT 250 FOR IP): Performed by: EMERGENCY MEDICINE

## 2024-09-14 PROCEDURE — 86900 BLOOD TYPING SEROLOGIC ABO: CPT

## 2024-09-14 PROCEDURE — 87040 BLOOD CULTURE FOR BACTERIA: CPT

## 2024-09-14 PROCEDURE — P9016 RBC LEUKOCYTES REDUCED: HCPCS

## 2024-09-14 PROCEDURE — 99285 EMERGENCY DEPT VISIT HI MDM: CPT

## 2024-09-14 PROCEDURE — P9040 RBC LEUKOREDUCED IRRADIATED: HCPCS

## 2024-09-14 PROCEDURE — 83605 ASSAY OF LACTIC ACID: CPT

## 2024-09-14 PROCEDURE — 84145 PROCALCITONIN (PCT): CPT

## 2024-09-14 PROCEDURE — 81003 URINALYSIS AUTO W/O SCOPE: CPT

## 2024-09-14 PROCEDURE — 84484 ASSAY OF TROPONIN QUANT: CPT

## 2024-09-14 RX ORDER — MAGNESIUM SULFATE IN WATER 40 MG/ML
2000 INJECTION, SOLUTION INTRAVENOUS ONCE
Status: COMPLETED | OUTPATIENT
Start: 2024-09-14 | End: 2024-09-15

## 2024-09-14 RX ORDER — SODIUM CHLORIDE 9 MG/ML
INJECTION, SOLUTION INTRAVENOUS PRN
Status: DISCONTINUED | OUTPATIENT
Start: 2024-09-14 | End: 2024-09-20 | Stop reason: HOSPADM

## 2024-09-14 RX ORDER — 0.9 % SODIUM CHLORIDE 0.9 %
1000 INTRAVENOUS SOLUTION INTRAVENOUS ONCE
Status: COMPLETED | OUTPATIENT
Start: 2024-09-14 | End: 2024-09-14

## 2024-09-14 RX ORDER — METRONIDAZOLE 500 MG/100ML
500 INJECTION, SOLUTION INTRAVENOUS ONCE
Status: COMPLETED | OUTPATIENT
Start: 2024-09-14 | End: 2024-09-15

## 2024-09-14 RX ORDER — ACETAMINOPHEN 500 MG
1000 TABLET ORAL ONCE
Status: COMPLETED | OUTPATIENT
Start: 2024-09-14 | End: 2024-09-14

## 2024-09-14 RX ADMIN — ACETAMINOPHEN 1000 MG: 500 TABLET ORAL at 17:27

## 2024-09-14 RX ADMIN — SODIUM CHLORIDE 1000 ML: 9 INJECTION, SOLUTION INTRAVENOUS at 17:43

## 2024-09-14 RX ADMIN — METRONIDAZOLE 500 MG: 500 INJECTION, SOLUTION INTRAVENOUS at 23:30

## 2024-09-14 ASSESSMENT — PAIN - FUNCTIONAL ASSESSMENT: PAIN_FUNCTIONAL_ASSESSMENT: NONE - DENIES PAIN

## 2024-09-15 ENCOUNTER — APPOINTMENT (OUTPATIENT)
Dept: CT IMAGING | Age: 74
DRG: 378 | End: 2024-09-15
Payer: COMMERCIAL

## 2024-09-15 LAB
ALBUMIN SERPL-MCNC: 3 G/DL (ref 3.4–5)
ALBUMIN/GLOB SERPL: 1.4 {RATIO} (ref 1.1–2.2)
ALP SERPL-CCNC: 42 U/L (ref 40–129)
ALT SERPL-CCNC: 7 U/L (ref 10–40)
ANION GAP SERPL CALCULATED.3IONS-SCNC: 11 MMOL/L (ref 3–16)
AST SERPL-CCNC: 9 U/L (ref 15–37)
BILIRUB SERPL-MCNC: 0.5 MG/DL (ref 0–1)
BLOOD BANK DISPENSE STATUS: NORMAL
BLOOD BANK PRODUCT CODE: NORMAL
BPU ID: NORMAL
BUN SERPL-MCNC: 16 MG/DL (ref 7–20)
CALCIUM SERPL-MCNC: 7.8 MG/DL (ref 8.3–10.6)
CHLORIDE SERPL-SCNC: 107 MMOL/L (ref 99–110)
CO2 SERPL-SCNC: 23 MMOL/L (ref 21–32)
CREAT SERPL-MCNC: 0.8 MG/DL (ref 0.6–1.2)
DEPRECATED RDW RBC AUTO: 15.5 % (ref 12.4–15.4)
DEPRECATED RDW RBC AUTO: 16.4 % (ref 12.4–15.4)
DESCRIPTION BLOOD BANK: NORMAL
EKG ATRIAL RATE: 108 BPM
EKG DIAGNOSIS: NORMAL
EKG P AXIS: 28 DEGREES
EKG P-R INTERVAL: 124 MS
EKG Q-T INTERVAL: 332 MS
EKG QRS DURATION: 72 MS
EKG QTC CALCULATION (BAZETT): 444 MS
EKG R AXIS: -3 DEGREES
EKG T AXIS: 59 DEGREES
EKG VENTRICULAR RATE: 108 BPM
GFR SERPLBLD CREATININE-BSD FMLA CKD-EPI: 76 ML/MIN/{1.73_M2}
GLUCOSE BLD-MCNC: 186 MG/DL (ref 70–99)
GLUCOSE SERPL-MCNC: 134 MG/DL (ref 70–99)
HCT VFR BLD AUTO: 14.3 % (ref 36–48)
HCT VFR BLD AUTO: 17.8 % (ref 36–48)
HCT VFR BLD AUTO: 25.5 % (ref 36–48)
HCT VFR BLD AUTO: 26.3 % (ref 36–48)
HCT VFR BLD AUTO: 26.5 % (ref 36–48)
HGB BLD-MCNC: 4.7 G/DL (ref 12–16)
HGB BLD-MCNC: 5.8 G/DL (ref 12–16)
HGB BLD-MCNC: 8.6 G/DL (ref 12–16)
HGB BLD-MCNC: 9 G/DL (ref 12–16)
HGB BLD-MCNC: 9.1 G/DL (ref 12–16)
MAGNESIUM SERPL-MCNC: 2.2 MG/DL (ref 1.8–2.4)
MCH RBC QN AUTO: 29.1 PG (ref 26–34)
MCH RBC QN AUTO: 30 PG (ref 26–34)
MCHC RBC AUTO-ENTMCNC: 33.5 G/DL (ref 31–36)
MCHC RBC AUTO-ENTMCNC: 34.2 G/DL (ref 31–36)
MCV RBC AUTO: 86.9 FL (ref 80–100)
MCV RBC AUTO: 87.7 FL (ref 80–100)
PERFORMED ON: ABNORMAL
PLATELET # BLD AUTO: 201 K/UL (ref 135–450)
PLATELET # BLD AUTO: 240 K/UL (ref 135–450)
PMV BLD AUTO: 7.1 FL (ref 5–10.5)
PMV BLD AUTO: 7.2 FL (ref 5–10.5)
POTASSIUM SERPL-SCNC: 3.1 MMOL/L (ref 3.5–5.1)
PROT SERPL-MCNC: 5.1 G/DL (ref 6.4–8.2)
RBC # BLD AUTO: 2.94 M/UL (ref 4–5.2)
RBC # BLD AUTO: 3.02 M/UL (ref 4–5.2)
SODIUM SERPL-SCNC: 141 MMOL/L (ref 136–145)
WBC # BLD AUTO: 13.5 K/UL (ref 4–11)
WBC # BLD AUTO: 15.7 K/UL (ref 4–11)

## 2024-09-15 PROCEDURE — 6360000002 HC RX W HCPCS: Performed by: EMERGENCY MEDICINE

## 2024-09-15 PROCEDURE — 6370000000 HC RX 637 (ALT 250 FOR IP): Performed by: INTERNAL MEDICINE

## 2024-09-15 PROCEDURE — 2000000000 HC ICU R&B

## 2024-09-15 PROCEDURE — 85014 HEMATOCRIT: CPT

## 2024-09-15 PROCEDURE — 2580000003 HC RX 258: Performed by: INTERNAL MEDICINE

## 2024-09-15 PROCEDURE — 83735 ASSAY OF MAGNESIUM: CPT

## 2024-09-15 PROCEDURE — 93010 ELECTROCARDIOGRAM REPORT: CPT | Performed by: INTERNAL MEDICINE

## 2024-09-15 PROCEDURE — 6360000004 HC RX CONTRAST MEDICATION: Performed by: INTERNAL MEDICINE

## 2024-09-15 PROCEDURE — 74174 CTA ABD&PLVS W/CONTRAST: CPT

## 2024-09-15 PROCEDURE — 85027 COMPLETE CBC AUTOMATED: CPT

## 2024-09-15 PROCEDURE — 83036 HEMOGLOBIN GLYCOSYLATED A1C: CPT

## 2024-09-15 PROCEDURE — 85018 HEMOGLOBIN: CPT

## 2024-09-15 PROCEDURE — 36430 TRANSFUSION BLD/BLD COMPNT: CPT

## 2024-09-15 PROCEDURE — 36415 COLL VENOUS BLD VENIPUNCTURE: CPT

## 2024-09-15 PROCEDURE — 80053 COMPREHEN METABOLIC PANEL: CPT

## 2024-09-15 RX ORDER — DORZOLAMIDE HCL 20 MG/ML
1 SOLUTION/ DROPS OPHTHALMIC 2 TIMES DAILY
Status: DISCONTINUED | OUTPATIENT
Start: 2024-09-15 | End: 2024-09-20 | Stop reason: HOSPADM

## 2024-09-15 RX ORDER — ONDANSETRON 4 MG/1
4 TABLET, ORALLY DISINTEGRATING ORAL EVERY 8 HOURS PRN
Status: DISCONTINUED | OUTPATIENT
Start: 2024-09-15 | End: 2024-09-20 | Stop reason: HOSPADM

## 2024-09-15 RX ORDER — INSULIN LISPRO 100 [IU]/ML
0-8 INJECTION, SOLUTION INTRAVENOUS; SUBCUTANEOUS
Status: DISCONTINUED | OUTPATIENT
Start: 2024-09-15 | End: 2024-09-20 | Stop reason: HOSPADM

## 2024-09-15 RX ORDER — LATANOPROST 50 UG/ML
1 SOLUTION/ DROPS OPHTHALMIC NIGHTLY
Status: DISCONTINUED | OUTPATIENT
Start: 2024-09-15 | End: 2024-09-20 | Stop reason: HOSPADM

## 2024-09-15 RX ORDER — SODIUM CHLORIDE 9 MG/ML
INJECTION, SOLUTION INTRAVENOUS PRN
Status: DISCONTINUED | OUTPATIENT
Start: 2024-09-15 | End: 2024-09-20 | Stop reason: HOSPADM

## 2024-09-15 RX ORDER — CALCIUM CHLORIDE 100 MG/ML
1000 INJECTION INTRAVENOUS; INTRAVENTRICULAR PRN
Status: DISCONTINUED | OUTPATIENT
Start: 2024-09-15 | End: 2024-09-20 | Stop reason: HOSPADM

## 2024-09-15 RX ORDER — SODIUM CHLORIDE 9 MG/ML
INJECTION, SOLUTION INTRAVENOUS PRN
Status: DISCONTINUED | OUTPATIENT
Start: 2024-09-15 | End: 2024-09-15

## 2024-09-15 RX ORDER — ATORVASTATIN CALCIUM 20 MG/1
20 TABLET, FILM COATED ORAL NIGHTLY
Status: DISCONTINUED | OUTPATIENT
Start: 2024-09-15 | End: 2024-09-20 | Stop reason: HOSPADM

## 2024-09-15 RX ORDER — ALBUTEROL SULFATE 0.83 MG/ML
2.5 SOLUTION RESPIRATORY (INHALATION) 3 TIMES DAILY PRN
Status: DISCONTINUED | OUTPATIENT
Start: 2024-09-15 | End: 2024-09-20 | Stop reason: HOSPADM

## 2024-09-15 RX ORDER — POTASSIUM CHLORIDE 1500 MG/1
40 TABLET, EXTENDED RELEASE ORAL ONCE
Status: COMPLETED | OUTPATIENT
Start: 2024-09-15 | End: 2024-09-15

## 2024-09-15 RX ORDER — SODIUM CHLORIDE 0.9 % (FLUSH) 0.9 %
5-40 SYRINGE (ML) INJECTION EVERY 12 HOURS SCHEDULED
Status: DISCONTINUED | OUTPATIENT
Start: 2024-09-15 | End: 2024-09-20 | Stop reason: HOSPADM

## 2024-09-15 RX ORDER — POTASSIUM CHLORIDE 7.45 MG/ML
10 INJECTION INTRAVENOUS PRN
Status: DISCONTINUED | OUTPATIENT
Start: 2024-09-15 | End: 2024-09-20 | Stop reason: HOSPADM

## 2024-09-15 RX ORDER — METOPROLOL TARTRATE 25 MG/1
25 TABLET, FILM COATED ORAL 2 TIMES DAILY
Status: DISCONTINUED | OUTPATIENT
Start: 2024-09-15 | End: 2024-09-20 | Stop reason: HOSPADM

## 2024-09-15 RX ORDER — CETIRIZINE HYDROCHLORIDE 10 MG/1
10 TABLET ORAL DAILY
Status: DISCONTINUED | OUTPATIENT
Start: 2024-09-16 | End: 2024-09-20 | Stop reason: HOSPADM

## 2024-09-15 RX ORDER — INSULIN LISPRO 100 [IU]/ML
0-4 INJECTION, SOLUTION INTRAVENOUS; SUBCUTANEOUS NIGHTLY
Status: DISCONTINUED | OUTPATIENT
Start: 2024-09-15 | End: 2024-09-20 | Stop reason: HOSPADM

## 2024-09-15 RX ORDER — SIMETHICONE 80 MG
80 TABLET,CHEWABLE ORAL 2 TIMES DAILY PRN
Status: DISCONTINUED | OUTPATIENT
Start: 2024-09-15 | End: 2024-09-20 | Stop reason: HOSPADM

## 2024-09-15 RX ORDER — POTASSIUM CHLORIDE 1500 MG/1
40 TABLET, EXTENDED RELEASE ORAL PRN
Status: DISCONTINUED | OUTPATIENT
Start: 2024-09-15 | End: 2024-09-20 | Stop reason: HOSPADM

## 2024-09-15 RX ORDER — MONTELUKAST SODIUM 10 MG/1
10 TABLET ORAL
Status: DISCONTINUED | OUTPATIENT
Start: 2024-09-15 | End: 2024-09-20 | Stop reason: HOSPADM

## 2024-09-15 RX ORDER — DEXTROSE MONOHYDRATE 100 MG/ML
INJECTION, SOLUTION INTRAVENOUS CONTINUOUS PRN
Status: DISCONTINUED | OUTPATIENT
Start: 2024-09-15 | End: 2024-09-20 | Stop reason: HOSPADM

## 2024-09-15 RX ORDER — MAGNESIUM SULFATE IN WATER 40 MG/ML
2000 INJECTION, SOLUTION INTRAVENOUS PRN
Status: DISCONTINUED | OUTPATIENT
Start: 2024-09-15 | End: 2024-09-20 | Stop reason: HOSPADM

## 2024-09-15 RX ORDER — GLUCAGON 1 MG/ML
1 KIT INJECTION PRN
Status: DISCONTINUED | OUTPATIENT
Start: 2024-09-15 | End: 2024-09-15 | Stop reason: RX

## 2024-09-15 RX ORDER — PREDNISOLONE ACETATE 10 MG/ML
1 SUSPENSION/ DROPS OPHTHALMIC 4 TIMES DAILY
Status: DISCONTINUED | OUTPATIENT
Start: 2024-09-15 | End: 2024-09-20 | Stop reason: HOSPADM

## 2024-09-15 RX ORDER — ALENDRONATE SODIUM 70 MG/1
70 TABLET ORAL
Status: DISCONTINUED | OUTPATIENT
Start: 2024-09-15 | End: 2024-09-15 | Stop reason: RX

## 2024-09-15 RX ORDER — IOPAMIDOL 755 MG/ML
75 INJECTION, SOLUTION INTRAVASCULAR
Status: COMPLETED | OUTPATIENT
Start: 2024-09-15 | End: 2024-09-15

## 2024-09-15 RX ORDER — PILOCARPINE HYDROCHLORIDE 10 MG/ML
1 SOLUTION/ DROPS OPHTHALMIC 3 TIMES DAILY
Status: DISCONTINUED | OUTPATIENT
Start: 2024-09-15 | End: 2024-09-20 | Stop reason: HOSPADM

## 2024-09-15 RX ORDER — SODIUM CHLORIDE 0.9 % (FLUSH) 0.9 %
5-40 SYRINGE (ML) INJECTION PRN
Status: DISCONTINUED | OUTPATIENT
Start: 2024-09-15 | End: 2024-09-20 | Stop reason: HOSPADM

## 2024-09-15 RX ORDER — FLUTICASONE PROPIONATE 50 MCG
1 SPRAY, SUSPENSION (ML) NASAL DAILY PRN
Status: DISCONTINUED | OUTPATIENT
Start: 2024-09-15 | End: 2024-09-20 | Stop reason: HOSPADM

## 2024-09-15 RX ORDER — BRIMONIDINE TARTRATE 2 MG/ML
1 SOLUTION/ DROPS OPHTHALMIC 3 TIMES DAILY
Status: DISCONTINUED | OUTPATIENT
Start: 2024-09-15 | End: 2024-09-20 | Stop reason: HOSPADM

## 2024-09-15 RX ORDER — ONDANSETRON 2 MG/ML
4 INJECTION INTRAMUSCULAR; INTRAVENOUS EVERY 6 HOURS PRN
Status: DISCONTINUED | OUTPATIENT
Start: 2024-09-15 | End: 2024-09-20 | Stop reason: HOSPADM

## 2024-09-15 RX ORDER — ACETAMINOPHEN 650 MG/1
650 SUPPOSITORY RECTAL EVERY 6 HOURS PRN
Status: DISCONTINUED | OUTPATIENT
Start: 2024-09-15 | End: 2024-09-20 | Stop reason: HOSPADM

## 2024-09-15 RX ORDER — ACETAMINOPHEN 325 MG/1
650 TABLET ORAL EVERY 6 HOURS PRN
Status: DISCONTINUED | OUTPATIENT
Start: 2024-09-15 | End: 2024-09-20 | Stop reason: HOSPADM

## 2024-09-15 RX ADMIN — IOPAMIDOL 75 ML: 755 INJECTION, SOLUTION INTRAVENOUS at 18:53

## 2024-09-15 RX ADMIN — POLYETHYLENE GLYCOL-3350 AND ELECTROLYTES 2000 ML: 236; 6.74; 5.86; 2.97; 22.74 POWDER, FOR SOLUTION ORAL at 22:11

## 2024-09-15 RX ADMIN — POLYETHYLENE GLYCOL-3350 AND ELECTROLYTES 4000 ML: 236; 6.74; 5.86; 2.97; 22.74 POWDER, FOR SOLUTION ORAL at 17:05

## 2024-09-15 RX ADMIN — BRIMONIDINE TARTRATE 1 DROP: 2 SOLUTION OPHTHALMIC at 20:45

## 2024-09-15 RX ADMIN — POTASSIUM CHLORIDE 40 MEQ: 1500 TABLET, EXTENDED RELEASE ORAL at 13:32

## 2024-09-15 RX ADMIN — MAGNESIUM SULFATE HEPTAHYDRATE 2000 MG: 40 INJECTION, SOLUTION INTRAVENOUS at 06:25

## 2024-09-15 RX ADMIN — SODIUM CHLORIDE, PRESERVATIVE FREE 10 ML: 5 INJECTION INTRAVENOUS at 20:24

## 2024-09-15 ASSESSMENT — PAIN SCALES - GENERAL
PAINLEVEL_OUTOF10: 0

## 2024-09-16 ENCOUNTER — ANESTHESIA (OUTPATIENT)
Dept: ENDOSCOPY | Age: 74
End: 2024-09-16
Payer: COMMERCIAL

## 2024-09-16 ENCOUNTER — ANESTHESIA EVENT (OUTPATIENT)
Dept: ENDOSCOPY | Age: 74
End: 2024-09-16
Payer: COMMERCIAL

## 2024-09-16 PROBLEM — F39 MOOD DISORDER (HCC): Status: ACTIVE | Noted: 2024-09-16

## 2024-09-16 LAB
ANION GAP SERPL CALCULATED.3IONS-SCNC: 7 MMOL/L (ref 3–16)
APTT BLD: 19.7 SEC (ref 22.1–36.4)
BASOPHILS # BLD: 0 K/UL (ref 0–0.2)
BASOPHILS NFR BLD: 0.3 %
BUN SERPL-MCNC: 11 MG/DL (ref 7–20)
CALCIUM SERPL-MCNC: 7.2 MG/DL (ref 8.3–10.6)
CHLORIDE SERPL-SCNC: 108 MMOL/L (ref 99–110)
CO2 SERPL-SCNC: 24 MMOL/L (ref 21–32)
CREAT SERPL-MCNC: 0.7 MG/DL (ref 0.6–1.2)
DEPRECATED RDW RBC AUTO: 15.9 % (ref 12.4–15.4)
EOSINOPHIL # BLD: 0.1 K/UL (ref 0–0.6)
EOSINOPHIL NFR BLD: 1.1 %
EST. AVERAGE GLUCOSE BLD GHB EST-MCNC: 116.9 MG/DL
GFR SERPLBLD CREATININE-BSD FMLA CKD-EPI: 90 ML/MIN/{1.73_M2}
GLUCOSE BLD-MCNC: 181 MG/DL (ref 70–99)
GLUCOSE BLD-MCNC: 209 MG/DL (ref 70–99)
GLUCOSE BLD-MCNC: 221 MG/DL (ref 70–99)
GLUCOSE BLD-MCNC: 250 MG/DL (ref 70–99)
GLUCOSE BLD-MCNC: 267 MG/DL (ref 70–99)
GLUCOSE SERPL-MCNC: 165 MG/DL (ref 70–99)
HBA1C MFR BLD: 5.7 %
HCT VFR BLD AUTO: 16.1 % (ref 36–48)
HCT VFR BLD AUTO: 18.3 % (ref 36–48)
HCT VFR BLD AUTO: 19.5 % (ref 36–48)
HCT VFR BLD AUTO: 24.2 % (ref 36–48)
HGB BLD-MCNC: 5.5 G/DL (ref 12–16)
HGB BLD-MCNC: 6.2 G/DL (ref 12–16)
HGB BLD-MCNC: 6.6 G/DL (ref 12–16)
HGB BLD-MCNC: 8.3 G/DL (ref 12–16)
INR PPP: 1.1 (ref 0.85–1.15)
IRON SATN MFR SERPL: 15 % (ref 15–50)
IRON SERPL-MCNC: 30 UG/DL (ref 37–145)
LYMPHOCYTES # BLD: 2.2 K/UL (ref 1–5.1)
LYMPHOCYTES NFR BLD: 17 %
MAGNESIUM SERPL-MCNC: 2.1 MG/DL (ref 1.8–2.4)
MCH RBC QN AUTO: 29.8 PG (ref 26–34)
MCHC RBC AUTO-ENTMCNC: 34.2 G/DL (ref 31–36)
MCV RBC AUTO: 87.1 FL (ref 80–100)
MONOCYTES # BLD: 0.9 K/UL (ref 0–1.3)
MONOCYTES NFR BLD: 7.4 %
NEUTROPHILS # BLD: 9.5 K/UL (ref 1.7–7.7)
NEUTROPHILS NFR BLD: 74.2 %
PATH INTERP BLD-IMP: NORMAL
PERFORMED ON: ABNORMAL
PLATELET # BLD AUTO: 189 K/UL (ref 135–450)
PMV BLD AUTO: 7.2 FL (ref 5–10.5)
POTASSIUM SERPL-SCNC: 3.2 MMOL/L (ref 3.5–5.1)
PROTHROMBIN TIME: 14.5 SEC (ref 11.9–14.9)
RBC # BLD AUTO: 2.78 M/UL (ref 4–5.2)
SODIUM SERPL-SCNC: 139 MMOL/L (ref 136–145)
TIBC SERPL-MCNC: 197 UG/DL (ref 260–445)
WBC # BLD AUTO: 12.7 K/UL (ref 4–11)

## 2024-09-16 PROCEDURE — 2709999900 HC NON-CHARGEABLE SUPPLY: Performed by: INTERNAL MEDICINE

## 2024-09-16 PROCEDURE — 85018 HEMOGLOBIN: CPT

## 2024-09-16 PROCEDURE — 6370000000 HC RX 637 (ALT 250 FOR IP): Performed by: INTERNAL MEDICINE

## 2024-09-16 PROCEDURE — 0DJD8ZZ INSPECTION OF LOWER INTESTINAL TRACT, VIA NATURAL OR ARTIFICIAL OPENING ENDOSCOPIC: ICD-10-PCS | Performed by: INTERNAL MEDICINE

## 2024-09-16 PROCEDURE — 2580000003 HC RX 258: Performed by: INTERNAL MEDICINE

## 2024-09-16 PROCEDURE — 6360000002 HC RX W HCPCS: Performed by: NURSE ANESTHETIST, CERTIFIED REGISTERED

## 2024-09-16 PROCEDURE — 2000000000 HC ICU R&B

## 2024-09-16 PROCEDURE — 85014 HEMATOCRIT: CPT

## 2024-09-16 PROCEDURE — 83735 ASSAY OF MAGNESIUM: CPT

## 2024-09-16 PROCEDURE — 7100000001 HC PACU RECOVERY - ADDTL 15 MIN: Performed by: INTERNAL MEDICINE

## 2024-09-16 PROCEDURE — 36430 TRANSFUSION BLD/BLD COMPNT: CPT

## 2024-09-16 PROCEDURE — 3609027000 HC COLONOSCOPY: Performed by: INTERNAL MEDICINE

## 2024-09-16 PROCEDURE — 85025 COMPLETE CBC W/AUTO DIFF WBC: CPT

## 2024-09-16 PROCEDURE — 80048 BASIC METABOLIC PNL TOTAL CA: CPT

## 2024-09-16 PROCEDURE — 3700000000 HC ANESTHESIA ATTENDED CARE: Performed by: INTERNAL MEDICINE

## 2024-09-16 PROCEDURE — 2580000003 HC RX 258: Performed by: NURSE ANESTHETIST, CERTIFIED REGISTERED

## 2024-09-16 PROCEDURE — 85730 THROMBOPLASTIN TIME PARTIAL: CPT

## 2024-09-16 PROCEDURE — 7100000000 HC PACU RECOVERY - FIRST 15 MIN: Performed by: INTERNAL MEDICINE

## 2024-09-16 PROCEDURE — 36415 COLL VENOUS BLD VENIPUNCTURE: CPT

## 2024-09-16 PROCEDURE — 83540 ASSAY OF IRON: CPT

## 2024-09-16 PROCEDURE — 2580000003 HC RX 258: Performed by: ANESTHESIOLOGY

## 2024-09-16 PROCEDURE — 83550 IRON BINDING TEST: CPT

## 2024-09-16 PROCEDURE — 3700000001 HC ADD 15 MINUTES (ANESTHESIA): Performed by: INTERNAL MEDICINE

## 2024-09-16 PROCEDURE — 85610 PROTHROMBIN TIME: CPT

## 2024-09-16 RX ORDER — SODIUM CHLORIDE 9 MG/ML
INJECTION, SOLUTION INTRAVENOUS
Status: DISCONTINUED | OUTPATIENT
Start: 2024-09-16 | End: 2024-09-16 | Stop reason: SDUPTHER

## 2024-09-16 RX ORDER — PROPOFOL 10 MG/ML
INJECTION, EMULSION INTRAVENOUS
Status: DISCONTINUED | OUTPATIENT
Start: 2024-09-16 | End: 2024-09-16

## 2024-09-16 RX ORDER — SODIUM CHLORIDE 9 MG/ML
INJECTION, SOLUTION INTRAVENOUS PRN
Status: DISCONTINUED | OUTPATIENT
Start: 2024-09-16 | End: 2024-09-20 | Stop reason: HOSPADM

## 2024-09-16 RX ORDER — PROPOFOL 10 MG/ML
INJECTION, EMULSION INTRAVENOUS
Status: DISCONTINUED | OUTPATIENT
Start: 2024-09-16 | End: 2024-09-16 | Stop reason: SDUPTHER

## 2024-09-16 RX ORDER — SODIUM CHLORIDE 9 MG/ML
INJECTION, SOLUTION INTRAVENOUS CONTINUOUS
Status: DISCONTINUED | OUTPATIENT
Start: 2024-09-16 | End: 2024-09-19

## 2024-09-16 RX ADMIN — PHENYLEPHRINE HYDROCHLORIDE 200 MCG: 10 INJECTION INTRAVENOUS at 09:56

## 2024-09-16 RX ADMIN — POTASSIUM CHLORIDE 40 MEQ: 1500 TABLET, EXTENDED RELEASE ORAL at 04:34

## 2024-09-16 RX ADMIN — PROPOFOL 20 MG: 10 INJECTION, EMULSION INTRAVENOUS at 09:38

## 2024-09-16 RX ADMIN — BRIMONIDINE TARTRATE 1 DROP: 2 SOLUTION OPHTHALMIC at 15:48

## 2024-09-16 RX ADMIN — PROPOFOL 150 MCG/KG/MIN: 10 INJECTION, EMULSION INTRAVENOUS at 09:38

## 2024-09-16 RX ADMIN — PHENYLEPHRINE HYDROCHLORIDE 100 MCG: 10 INJECTION INTRAVENOUS at 09:43

## 2024-09-16 RX ADMIN — DORZOLAMIDE HCL 1 DROP: 20 SOLUTION/ DROPS OPHTHALMIC at 21:20

## 2024-09-16 RX ADMIN — CETIRIZINE HYDROCHLORIDE 10 MG: 10 TABLET, FILM COATED ORAL at 12:31

## 2024-09-16 RX ADMIN — PHENYLEPHRINE HYDROCHLORIDE 100 MCG: 10 INJECTION INTRAVENOUS at 09:46

## 2024-09-16 RX ADMIN — LATANOPROST 1 DROP: 50 SOLUTION OPHTHALMIC at 21:35

## 2024-09-16 RX ADMIN — SERTRALINE 100 MG: 50 TABLET, FILM COATED ORAL at 21:03

## 2024-09-16 RX ADMIN — SODIUM CHLORIDE: 9 INJECTION, SOLUTION INTRAVENOUS at 09:28

## 2024-09-16 RX ADMIN — INSULIN LISPRO 2 UNITS: 100 INJECTION, SOLUTION INTRAVENOUS; SUBCUTANEOUS at 08:31

## 2024-09-16 RX ADMIN — ATORVASTATIN CALCIUM 20 MG: 20 TABLET, FILM COATED ORAL at 21:03

## 2024-09-16 RX ADMIN — BRIMONIDINE TARTRATE 1 DROP: 2 SOLUTION OPHTHALMIC at 21:14

## 2024-09-16 RX ADMIN — PHENYLEPHRINE HYDROCHLORIDE 100 MCG: 10 INJECTION INTRAVENOUS at 09:50

## 2024-09-16 RX ADMIN — SERTRALINE 100 MG: 50 TABLET, FILM COATED ORAL at 12:31

## 2024-09-16 RX ADMIN — PREDNISOLONE ACETATE 1 DROP: 10 SUSPENSION/ DROPS OPHTHALMIC at 21:30

## 2024-09-16 RX ADMIN — METOPROLOL TARTRATE 25 MG: 25 TABLET, FILM COATED ORAL at 12:31

## 2024-09-16 RX ADMIN — METOPROLOL TARTRATE 25 MG: 25 TABLET, FILM COATED ORAL at 21:03

## 2024-09-16 RX ADMIN — PILOCARPINE HYDROCHLORIDE 1 DROP: 10 SOLUTION/ DROPS OPHTHALMIC at 15:52

## 2024-09-16 RX ADMIN — PILOCARPINE HYDROCHLORIDE 1 DROP: 10 SOLUTION/ DROPS OPHTHALMIC at 21:25

## 2024-09-16 RX ADMIN — DORZOLAMIDE HCL 1 DROP: 20 SOLUTION/ DROPS OPHTHALMIC at 01:55

## 2024-09-16 RX ADMIN — INSULIN LISPRO 4 UNITS: 100 INJECTION, SOLUTION INTRAVENOUS; SUBCUTANEOUS at 16:44

## 2024-09-16 RX ADMIN — SODIUM CHLORIDE, PRESERVATIVE FREE 20 ML: 5 INJECTION INTRAVENOUS at 08:35

## 2024-09-16 RX ADMIN — SODIUM CHLORIDE, PRESERVATIVE FREE 10 ML: 5 INJECTION INTRAVENOUS at 21:03

## 2024-09-16 RX ADMIN — MONTELUKAST SODIUM 10 MG: 10 TABLET, FILM COATED ORAL at 21:03

## 2024-09-16 RX ADMIN — PREDNISOLONE ACETATE 1 DROP: 10 SUSPENSION/ DROPS OPHTHALMIC at 15:48

## 2024-09-16 RX ADMIN — ACETAMINOPHEN 650 MG: 325 TABLET ORAL at 18:49

## 2024-09-16 RX ADMIN — SODIUM CHLORIDE: 9 INJECTION, SOLUTION INTRAVENOUS at 09:34

## 2024-09-16 ASSESSMENT — PAIN SCALES - GENERAL
PAINLEVEL_OUTOF10: 7
PAINLEVEL_OUTOF10: 0
PAINLEVEL_OUTOF10: 3
PAINLEVEL_OUTOF10: 0

## 2024-09-16 ASSESSMENT — PAIN - FUNCTIONAL ASSESSMENT
PAIN_FUNCTIONAL_ASSESSMENT: NONE - DENIES PAIN

## 2024-09-16 ASSESSMENT — ENCOUNTER SYMPTOMS: SHORTNESS OF BREATH: 0

## 2024-09-16 ASSESSMENT — PAIN DESCRIPTION - ORIENTATION: ORIENTATION: RIGHT

## 2024-09-16 ASSESSMENT — PAIN DESCRIPTION - LOCATION: LOCATION: ARM

## 2024-09-17 LAB
ALBUMIN SERPL-MCNC: 2.3 G/DL (ref 3.4–5)
ANION GAP SERPL CALCULATED.3IONS-SCNC: 6 MMOL/L (ref 3–16)
BASOPHILS # BLD: 0.1 K/UL (ref 0–0.2)
BASOPHILS NFR BLD: 0.4 %
BUN SERPL-MCNC: 7 MG/DL (ref 7–20)
CALCIUM SERPL-MCNC: 6.9 MG/DL (ref 8.3–10.6)
CHLORIDE SERPL-SCNC: 111 MMOL/L (ref 99–110)
CO2 SERPL-SCNC: 22 MMOL/L (ref 21–32)
CREAT SERPL-MCNC: 0.7 MG/DL (ref 0.6–1.2)
DEPRECATED RDW RBC AUTO: 17 % (ref 12.4–15.4)
EOSINOPHIL # BLD: 0.3 K/UL (ref 0–0.6)
EOSINOPHIL NFR BLD: 2.5 %
GFR SERPLBLD CREATININE-BSD FMLA CKD-EPI: 90 ML/MIN/{1.73_M2}
GLUCOSE BLD-MCNC: 186 MG/DL (ref 70–99)
GLUCOSE BLD-MCNC: 205 MG/DL (ref 70–99)
GLUCOSE SERPL-MCNC: 155 MG/DL (ref 70–99)
HCT VFR BLD AUTO: 22.4 % (ref 36–48)
HCT VFR BLD AUTO: 23.2 % (ref 36–48)
HCT VFR BLD AUTO: 23.3 % (ref 36–48)
HCT VFR BLD AUTO: 23.7 % (ref 36–48)
HGB BLD-MCNC: 7.4 G/DL (ref 12–16)
HGB BLD-MCNC: 7.6 G/DL (ref 12–16)
HGB BLD-MCNC: 7.8 G/DL (ref 12–16)
HGB BLD-MCNC: 8 G/DL (ref 12–16)
LYMPHOCYTES # BLD: 2.6 K/UL (ref 1–5.1)
LYMPHOCYTES NFR BLD: 21.6 %
MCH RBC QN AUTO: 30.1 PG (ref 26–34)
MCHC RBC AUTO-ENTMCNC: 33.6 G/DL (ref 31–36)
MCV RBC AUTO: 89.6 FL (ref 80–100)
MONOCYTES # BLD: 1 K/UL (ref 0–1.3)
MONOCYTES NFR BLD: 8 %
NEUTROPHILS # BLD: 8.3 K/UL (ref 1.7–7.7)
NEUTROPHILS NFR BLD: 67.5 %
PERFORMED ON: ABNORMAL
PERFORMED ON: ABNORMAL
PHOSPHATE SERPL-MCNC: 3.1 MG/DL (ref 2.5–4.9)
PLATELET # BLD AUTO: 162 K/UL (ref 135–450)
PMV BLD AUTO: 7.2 FL (ref 5–10.5)
POTASSIUM SERPL-SCNC: 3.8 MMOL/L (ref 3.5–5.1)
RBC # BLD AUTO: 2.59 M/UL (ref 4–5.2)
SODIUM SERPL-SCNC: 139 MMOL/L (ref 136–145)
WBC # BLD AUTO: 12.2 K/UL (ref 4–11)

## 2024-09-17 PROCEDURE — 97530 THERAPEUTIC ACTIVITIES: CPT

## 2024-09-17 PROCEDURE — 97161 PT EVAL LOW COMPLEX 20 MIN: CPT

## 2024-09-17 PROCEDURE — 2580000003 HC RX 258: Performed by: INTERNAL MEDICINE

## 2024-09-17 PROCEDURE — 85018 HEMOGLOBIN: CPT

## 2024-09-17 PROCEDURE — 6370000000 HC RX 637 (ALT 250 FOR IP): Performed by: INTERNAL MEDICINE

## 2024-09-17 PROCEDURE — 80069 RENAL FUNCTION PANEL: CPT

## 2024-09-17 PROCEDURE — 36415 COLL VENOUS BLD VENIPUNCTURE: CPT

## 2024-09-17 PROCEDURE — 97116 GAIT TRAINING THERAPY: CPT

## 2024-09-17 PROCEDURE — 97535 SELF CARE MNGMENT TRAINING: CPT

## 2024-09-17 PROCEDURE — 97166 OT EVAL MOD COMPLEX 45 MIN: CPT

## 2024-09-17 PROCEDURE — 1200000000 HC SEMI PRIVATE

## 2024-09-17 PROCEDURE — 85025 COMPLETE CBC W/AUTO DIFF WBC: CPT

## 2024-09-17 PROCEDURE — 85014 HEMATOCRIT: CPT

## 2024-09-17 RX ORDER — CALCIUM GLUCONATE 20 MG/ML
2000 INJECTION, SOLUTION INTRAVENOUS ONCE
Status: DISCONTINUED | OUTPATIENT
Start: 2024-09-17 | End: 2024-09-17

## 2024-09-17 RX ADMIN — BRIMONIDINE TARTRATE 1 DROP: 2 SOLUTION OPHTHALMIC at 21:33

## 2024-09-17 RX ADMIN — PREDNISOLONE ACETATE 1 DROP: 10 SUSPENSION/ DROPS OPHTHALMIC at 08:17

## 2024-09-17 RX ADMIN — METOPROLOL TARTRATE 25 MG: 25 TABLET, FILM COATED ORAL at 08:16

## 2024-09-17 RX ADMIN — DORZOLAMIDE HCL 1 DROP: 20 SOLUTION/ DROPS OPHTHALMIC at 08:18

## 2024-09-17 RX ADMIN — PILOCARPINE HYDROCHLORIDE 1 DROP: 10 SOLUTION/ DROPS OPHTHALMIC at 08:19

## 2024-09-17 RX ADMIN — PILOCARPINE HYDROCHLORIDE 1 DROP: 10 SOLUTION/ DROPS OPHTHALMIC at 21:19

## 2024-09-17 RX ADMIN — PREDNISOLONE ACETATE 1 DROP: 10 SUSPENSION/ DROPS OPHTHALMIC at 21:33

## 2024-09-17 RX ADMIN — CETIRIZINE HYDROCHLORIDE 10 MG: 10 TABLET, FILM COATED ORAL at 08:16

## 2024-09-17 RX ADMIN — SERTRALINE 100 MG: 50 TABLET, FILM COATED ORAL at 08:16

## 2024-09-17 RX ADMIN — BRIMONIDINE TARTRATE 1 DROP: 2 SOLUTION OPHTHALMIC at 16:04

## 2024-09-17 RX ADMIN — METOPROLOL TARTRATE 25 MG: 25 TABLET, FILM COATED ORAL at 21:18

## 2024-09-17 RX ADMIN — DORZOLAMIDE HCL 1 DROP: 20 SOLUTION/ DROPS OPHTHALMIC at 21:19

## 2024-09-17 RX ADMIN — SODIUM CHLORIDE, PRESERVATIVE FREE 10 ML: 5 INJECTION INTRAVENOUS at 21:18

## 2024-09-17 RX ADMIN — MONTELUKAST SODIUM 10 MG: 10 TABLET, FILM COATED ORAL at 21:18

## 2024-09-17 RX ADMIN — SODIUM CHLORIDE, PRESERVATIVE FREE 10 ML: 5 INJECTION INTRAVENOUS at 08:16

## 2024-09-17 RX ADMIN — LATANOPROST 1 DROP: 50 SOLUTION OPHTHALMIC at 21:20

## 2024-09-17 RX ADMIN — PILOCARPINE HYDROCHLORIDE 1 DROP: 10 SOLUTION/ DROPS OPHTHALMIC at 16:02

## 2024-09-17 RX ADMIN — SERTRALINE 100 MG: 50 TABLET, FILM COATED ORAL at 21:18

## 2024-09-17 RX ADMIN — BRIMONIDINE TARTRATE 1 DROP: 2 SOLUTION OPHTHALMIC at 08:17

## 2024-09-17 RX ADMIN — PREDNISOLONE ACETATE 1 DROP: 10 SUSPENSION/ DROPS OPHTHALMIC at 16:04

## 2024-09-17 RX ADMIN — ATORVASTATIN CALCIUM 20 MG: 20 TABLET, FILM COATED ORAL at 21:18

## 2024-09-17 ASSESSMENT — PAIN SCALES - GENERAL: PAINLEVEL_OUTOF10: 0

## 2024-09-18 ENCOUNTER — APPOINTMENT (OUTPATIENT)
Dept: NUCLEAR MEDICINE | Age: 74
DRG: 378 | End: 2024-09-18
Payer: COMMERCIAL

## 2024-09-18 LAB
ALBUMIN SERPL-MCNC: 2.7 G/DL (ref 3.4–5)
ANION GAP SERPL CALCULATED.3IONS-SCNC: 7 MMOL/L (ref 3–16)
BACTERIA BLD CULT ORG #2: NORMAL
BACTERIA BLD CULT: NORMAL
BASOPHILS # BLD: 0.1 K/UL (ref 0–0.2)
BASOPHILS NFR BLD: 0.5 %
BLOOD BANK DISPENSE STATUS: NORMAL
BLOOD BANK PRODUCT CODE: NORMAL
BPU ID: NORMAL
BUN SERPL-MCNC: 6 MG/DL (ref 7–20)
CALCIUM SERPL-MCNC: 7.6 MG/DL (ref 8.3–10.6)
CHLORIDE SERPL-SCNC: 108 MMOL/L (ref 99–110)
CO2 SERPL-SCNC: 24 MMOL/L (ref 21–32)
CREAT SERPL-MCNC: 0.6 MG/DL (ref 0.6–1.2)
DEPRECATED RDW RBC AUTO: 17.5 % (ref 12.4–15.4)
DESCRIPTION BLOOD BANK: NORMAL
EOSINOPHIL # BLD: 0.3 K/UL (ref 0–0.6)
EOSINOPHIL NFR BLD: 2.9 %
GFR SERPLBLD CREATININE-BSD FMLA CKD-EPI: >90 ML/MIN/{1.73_M2}
GLUCOSE BLD-MCNC: 189 MG/DL (ref 70–99)
GLUCOSE BLD-MCNC: 200 MG/DL (ref 70–99)
GLUCOSE BLD-MCNC: 287 MG/DL (ref 70–99)
GLUCOSE BLD-MCNC: 304 MG/DL (ref 70–99)
GLUCOSE SERPL-MCNC: 169 MG/DL (ref 70–99)
HCT VFR BLD AUTO: 22.8 % (ref 36–48)
HCT VFR BLD AUTO: 23.5 % (ref 36–48)
HCT VFR BLD AUTO: 23.9 % (ref 36–48)
HGB BLD-MCNC: 7.5 G/DL (ref 12–16)
HGB BLD-MCNC: 7.7 G/DL (ref 12–16)
HGB BLD-MCNC: 7.9 G/DL (ref 12–16)
LYMPHOCYTES # BLD: 1.8 K/UL (ref 1–5.1)
LYMPHOCYTES NFR BLD: 16.2 %
MCH RBC QN AUTO: 30.6 PG (ref 26–34)
MCHC RBC AUTO-ENTMCNC: 33.4 G/DL (ref 31–36)
MCV RBC AUTO: 91.8 FL (ref 80–100)
MONOCYTES # BLD: 0.8 K/UL (ref 0–1.3)
MONOCYTES NFR BLD: 6.9 %
NEUTROPHILS # BLD: 8.4 K/UL (ref 1.7–7.7)
NEUTROPHILS NFR BLD: 73.5 %
PERFORMED ON: ABNORMAL
PHOSPHATE SERPL-MCNC: 2.9 MG/DL (ref 2.5–4.9)
PLATELET # BLD AUTO: 194 K/UL (ref 135–450)
PMV BLD AUTO: 7.2 FL (ref 5–10.5)
POTASSIUM SERPL-SCNC: 3.7 MMOL/L (ref 3.5–5.1)
RBC # BLD AUTO: 2.56 M/UL (ref 4–5.2)
SODIUM SERPL-SCNC: 139 MMOL/L (ref 136–145)
WBC # BLD AUTO: 11.4 K/UL (ref 4–11)

## 2024-09-18 PROCEDURE — 1200000000 HC SEMI PRIVATE

## 2024-09-18 PROCEDURE — 85018 HEMOGLOBIN: CPT

## 2024-09-18 PROCEDURE — 6370000000 HC RX 637 (ALT 250 FOR IP): Performed by: INTERNAL MEDICINE

## 2024-09-18 PROCEDURE — 2580000003 HC RX 258: Performed by: INTERNAL MEDICINE

## 2024-09-18 PROCEDURE — 78278 ACUTE GI BLOOD LOSS IMAGING: CPT

## 2024-09-18 PROCEDURE — 94760 N-INVAS EAR/PLS OXIMETRY 1: CPT

## 2024-09-18 PROCEDURE — 36415 COLL VENOUS BLD VENIPUNCTURE: CPT

## 2024-09-18 PROCEDURE — 85014 HEMATOCRIT: CPT

## 2024-09-18 PROCEDURE — A9560 TC99M LABELED RBC: HCPCS | Performed by: INTERNAL MEDICINE

## 2024-09-18 PROCEDURE — 99222 1ST HOSP IP/OBS MODERATE 55: CPT | Performed by: SURGERY

## 2024-09-18 PROCEDURE — 2580000003 HC RX 258: Performed by: ANESTHESIOLOGY

## 2024-09-18 PROCEDURE — 80069 RENAL FUNCTION PANEL: CPT

## 2024-09-18 PROCEDURE — 3430000000 HC RX DIAGNOSTIC RADIOPHARMACEUTICAL: Performed by: INTERNAL MEDICINE

## 2024-09-18 PROCEDURE — 85025 COMPLETE CBC W/AUTO DIFF WBC: CPT

## 2024-09-18 RX ORDER — HYDROCORTISONE 25 MG/G
CREAM TOPICAL 2 TIMES DAILY
Status: DISCONTINUED | OUTPATIENT
Start: 2024-09-18 | End: 2024-09-20 | Stop reason: HOSPADM

## 2024-09-18 RX ORDER — SODIUM CHLORIDE 0.9 % (FLUSH) 0.9 %
5-40 SYRINGE (ML) INJECTION PRN
Status: DISCONTINUED | OUTPATIENT
Start: 2024-09-18 | End: 2024-09-20 | Stop reason: HOSPADM

## 2024-09-18 RX ADMIN — PREDNISOLONE ACETATE 1 DROP: 10 SUSPENSION/ DROPS OPHTHALMIC at 21:15

## 2024-09-18 RX ADMIN — DORZOLAMIDE HCL 1 DROP: 20 SOLUTION/ DROPS OPHTHALMIC at 21:16

## 2024-09-18 RX ADMIN — SODIUM CHLORIDE, PRESERVATIVE FREE 10 ML: 5 INJECTION INTRAVENOUS at 14:11

## 2024-09-18 RX ADMIN — BRIMONIDINE TARTRATE 1 DROP: 2 SOLUTION OPHTHALMIC at 21:15

## 2024-09-18 RX ADMIN — PREDNISOLONE ACETATE 1 DROP: 10 SUSPENSION/ DROPS OPHTHALMIC at 07:57

## 2024-09-18 RX ADMIN — METOPROLOL TARTRATE 25 MG: 25 TABLET, FILM COATED ORAL at 21:14

## 2024-09-18 RX ADMIN — ATORVASTATIN CALCIUM 20 MG: 20 TABLET, FILM COATED ORAL at 21:14

## 2024-09-18 RX ADMIN — PREDNISOLONE ACETATE 1 DROP: 10 SUSPENSION/ DROPS OPHTHALMIC at 16:46

## 2024-09-18 RX ADMIN — SERTRALINE 100 MG: 50 TABLET, FILM COATED ORAL at 07:55

## 2024-09-18 RX ADMIN — Medication 27.92 MILLICURIE: at 14:10

## 2024-09-18 RX ADMIN — DORZOLAMIDE HCL 1 DROP: 20 SOLUTION/ DROPS OPHTHALMIC at 07:57

## 2024-09-18 RX ADMIN — INSULIN LISPRO 6 UNITS: 100 INJECTION, SOLUTION INTRAVENOUS; SUBCUTANEOUS at 18:36

## 2024-09-18 RX ADMIN — SODIUM CHLORIDE: 9 INJECTION, SOLUTION INTRAVENOUS at 21:21

## 2024-09-18 RX ADMIN — SODIUM CHLORIDE, PRESERVATIVE FREE 10 ML: 5 INJECTION INTRAVENOUS at 07:57

## 2024-09-18 RX ADMIN — METOPROLOL TARTRATE 25 MG: 25 TABLET, FILM COATED ORAL at 07:54

## 2024-09-18 RX ADMIN — SODIUM CHLORIDE, PRESERVATIVE FREE 10 ML: 5 INJECTION INTRAVENOUS at 21:17

## 2024-09-18 RX ADMIN — PILOCARPINE HYDROCHLORIDE 1 DROP: 10 SOLUTION/ DROPS OPHTHALMIC at 21:17

## 2024-09-18 RX ADMIN — CETIRIZINE HYDROCHLORIDE 10 MG: 10 TABLET, FILM COATED ORAL at 07:54

## 2024-09-18 RX ADMIN — MONTELUKAST SODIUM 10 MG: 10 TABLET, FILM COATED ORAL at 21:14

## 2024-09-18 RX ADMIN — LATANOPROST 1 DROP: 50 SOLUTION OPHTHALMIC at 21:15

## 2024-09-18 RX ADMIN — SERTRALINE 100 MG: 50 TABLET, FILM COATED ORAL at 21:14

## 2024-09-18 RX ADMIN — ACETAMINOPHEN 650 MG: 325 TABLET ORAL at 07:55

## 2024-09-18 RX ADMIN — PILOCARPINE HYDROCHLORIDE 1 DROP: 10 SOLUTION/ DROPS OPHTHALMIC at 07:58

## 2024-09-18 ASSESSMENT — PAIN - FUNCTIONAL ASSESSMENT: PAIN_FUNCTIONAL_ASSESSMENT: ACTIVITIES ARE NOT PREVENTED

## 2024-09-18 ASSESSMENT — PAIN DESCRIPTION - ORIENTATION: ORIENTATION: MID;LOWER

## 2024-09-18 ASSESSMENT — PAIN SCALES - GENERAL
PAINLEVEL_OUTOF10: 2
PAINLEVEL_OUTOF10: 3
PAINLEVEL_OUTOF10: 2

## 2024-09-18 ASSESSMENT — PAIN DESCRIPTION - DESCRIPTORS: DESCRIPTORS: ACHING

## 2024-09-18 ASSESSMENT — PAIN DESCRIPTION - LOCATION: LOCATION: BACK

## 2024-09-18 ASSESSMENT — PAIN DESCRIPTION - PAIN TYPE: TYPE: ACUTE PAIN

## 2024-09-19 LAB
ALBUMIN SERPL-MCNC: 2.4 G/DL (ref 3.4–5)
ANION GAP SERPL CALCULATED.3IONS-SCNC: 8 MMOL/L (ref 3–16)
BASOPHILS # BLD: 0.1 K/UL (ref 0–0.2)
BASOPHILS NFR BLD: 0.7 %
BUN SERPL-MCNC: 5 MG/DL (ref 7–20)
CALCIUM SERPL-MCNC: 7.6 MG/DL (ref 8.3–10.6)
CHLORIDE SERPL-SCNC: 108 MMOL/L (ref 99–110)
CO2 SERPL-SCNC: 23 MMOL/L (ref 21–32)
CREAT SERPL-MCNC: 0.6 MG/DL (ref 0.6–1.2)
DEPRECATED RDW RBC AUTO: 17.6 % (ref 12.4–15.4)
EOSINOPHIL # BLD: 0.4 K/UL (ref 0–0.6)
EOSINOPHIL NFR BLD: 3.5 %
GFR SERPLBLD CREATININE-BSD FMLA CKD-EPI: >90 ML/MIN/{1.73_M2}
GLUCOSE BLD-MCNC: 125 MG/DL (ref 70–99)
GLUCOSE BLD-MCNC: 150 MG/DL (ref 70–99)
GLUCOSE BLD-MCNC: 215 MG/DL (ref 70–99)
GLUCOSE BLD-MCNC: 216 MG/DL (ref 70–99)
GLUCOSE SERPL-MCNC: 144 MG/DL (ref 70–99)
HCT VFR BLD AUTO: 21.7 % (ref 36–48)
HGB BLD-MCNC: 7.2 G/DL (ref 12–16)
LYMPHOCYTES # BLD: 2.1 K/UL (ref 1–5.1)
LYMPHOCYTES NFR BLD: 19.7 %
MCH RBC QN AUTO: 30.1 PG (ref 26–34)
MCHC RBC AUTO-ENTMCNC: 33.1 G/DL (ref 31–36)
MCV RBC AUTO: 90.9 FL (ref 80–100)
MONOCYTES # BLD: 0.6 K/UL (ref 0–1.3)
MONOCYTES NFR BLD: 6 %
NEUTROPHILS # BLD: 7.4 K/UL (ref 1.7–7.7)
NEUTROPHILS NFR BLD: 70.1 %
PERFORMED ON: ABNORMAL
PHOSPHATE SERPL-MCNC: 3 MG/DL (ref 2.5–4.9)
PLATELET # BLD AUTO: 244 K/UL (ref 135–450)
PMV BLD AUTO: 7.1 FL (ref 5–10.5)
POTASSIUM SERPL-SCNC: 3.6 MMOL/L (ref 3.5–5.1)
RBC # BLD AUTO: 2.39 M/UL (ref 4–5.2)
SODIUM SERPL-SCNC: 139 MMOL/L (ref 136–145)
WBC # BLD AUTO: 10.6 K/UL (ref 4–11)

## 2024-09-19 PROCEDURE — 2580000003 HC RX 258: Performed by: INTERNAL MEDICINE

## 2024-09-19 PROCEDURE — 99232 SBSQ HOSP IP/OBS MODERATE 35: CPT | Performed by: SURGERY

## 2024-09-19 PROCEDURE — 1200000000 HC SEMI PRIVATE

## 2024-09-19 PROCEDURE — 36415 COLL VENOUS BLD VENIPUNCTURE: CPT

## 2024-09-19 PROCEDURE — 80069 RENAL FUNCTION PANEL: CPT

## 2024-09-19 PROCEDURE — 6370000000 HC RX 637 (ALT 250 FOR IP): Performed by: INTERNAL MEDICINE

## 2024-09-19 PROCEDURE — 85025 COMPLETE CBC W/AUTO DIFF WBC: CPT

## 2024-09-19 RX ADMIN — PREDNISOLONE ACETATE 1 DROP: 10 SUSPENSION/ DROPS OPHTHALMIC at 16:25

## 2024-09-19 RX ADMIN — HYDROCORTISONE: 25 CREAM TOPICAL at 20:47

## 2024-09-19 RX ADMIN — LATANOPROST 1 DROP: 50 SOLUTION OPHTHALMIC at 20:49

## 2024-09-19 RX ADMIN — PREDNISOLONE ACETATE 1 DROP: 10 SUSPENSION/ DROPS OPHTHALMIC at 23:07

## 2024-09-19 RX ADMIN — PILOCARPINE HYDROCHLORIDE 1 DROP: 10 SOLUTION/ DROPS OPHTHALMIC at 20:48

## 2024-09-19 RX ADMIN — HYDROCORTISONE: 25 CREAM TOPICAL at 11:18

## 2024-09-19 RX ADMIN — DORZOLAMIDE HCL 1 DROP: 20 SOLUTION/ DROPS OPHTHALMIC at 20:47

## 2024-09-19 RX ADMIN — CETIRIZINE HYDROCHLORIDE 10 MG: 10 TABLET, FILM COATED ORAL at 11:03

## 2024-09-19 RX ADMIN — SODIUM CHLORIDE, PRESERVATIVE FREE 10 ML: 5 INJECTION INTRAVENOUS at 11:18

## 2024-09-19 RX ADMIN — INSULIN LISPRO 2 UNITS: 100 INJECTION, SOLUTION INTRAVENOUS; SUBCUTANEOUS at 11:27

## 2024-09-19 RX ADMIN — PILOCARPINE HYDROCHLORIDE 1 DROP: 10 SOLUTION/ DROPS OPHTHALMIC at 11:23

## 2024-09-19 RX ADMIN — PILOCARPINE HYDROCHLORIDE 1 DROP: 10 SOLUTION/ DROPS OPHTHALMIC at 15:15

## 2024-09-19 RX ADMIN — DORZOLAMIDE HCL 1 DROP: 20 SOLUTION/ DROPS OPHTHALMIC at 11:17

## 2024-09-19 RX ADMIN — SERTRALINE 100 MG: 50 TABLET, FILM COATED ORAL at 21:07

## 2024-09-19 RX ADMIN — BRIMONIDINE TARTRATE 1 DROP: 2 SOLUTION OPHTHALMIC at 11:10

## 2024-09-19 RX ADMIN — METOPROLOL TARTRATE 25 MG: 25 TABLET, FILM COATED ORAL at 11:02

## 2024-09-19 RX ADMIN — SERTRALINE 100 MG: 50 TABLET, FILM COATED ORAL at 11:03

## 2024-09-19 RX ADMIN — INSULIN LISPRO 2 UNITS: 100 INJECTION, SOLUTION INTRAVENOUS; SUBCUTANEOUS at 17:00

## 2024-09-19 RX ADMIN — BRIMONIDINE TARTRATE 1 DROP: 2 SOLUTION OPHTHALMIC at 20:46

## 2024-09-19 RX ADMIN — PREDNISOLONE ACETATE 1 DROP: 10 SUSPENSION/ DROPS OPHTHALMIC at 11:29

## 2024-09-19 RX ADMIN — ATORVASTATIN CALCIUM 20 MG: 20 TABLET, FILM COATED ORAL at 21:07

## 2024-09-19 RX ADMIN — METOPROLOL TARTRATE 25 MG: 25 TABLET, FILM COATED ORAL at 21:07

## 2024-09-19 RX ADMIN — MONTELUKAST SODIUM 10 MG: 10 TABLET, FILM COATED ORAL at 21:07

## 2024-09-19 RX ADMIN — BRIMONIDINE TARTRATE 1 DROP: 2 SOLUTION OPHTHALMIC at 17:00

## 2024-09-19 ASSESSMENT — PAIN SCALES - GENERAL
PAINLEVEL_OUTOF10: 0

## 2024-09-20 VITALS
WEIGHT: 230 LBS | TEMPERATURE: 98.8 F | HEART RATE: 90 BPM | RESPIRATION RATE: 18 BRPM | BODY MASS INDEX: 43.43 KG/M2 | HEIGHT: 61 IN | SYSTOLIC BLOOD PRESSURE: 104 MMHG | OXYGEN SATURATION: 94 % | DIASTOLIC BLOOD PRESSURE: 57 MMHG

## 2024-09-20 LAB
ABO + RH BLD: NORMAL
ALBUMIN SERPL-MCNC: 2.5 G/DL (ref 3.4–5)
ANION GAP SERPL CALCULATED.3IONS-SCNC: 12 MMOL/L (ref 3–16)
BASOPHILS # BLD: 0 K/UL (ref 0–0.2)
BASOPHILS NFR BLD: 0.4 %
BLD GP AB SCN SERPL QL: NORMAL
BLOOD BANK DISPENSE STATUS: NORMAL
BLOOD BANK PRODUCT CODE: NORMAL
BPU ID: NORMAL
BUN SERPL-MCNC: 4 MG/DL (ref 7–20)
CALCIUM SERPL-MCNC: 7.9 MG/DL (ref 8.3–10.6)
CHLORIDE SERPL-SCNC: 105 MMOL/L (ref 99–110)
CO2 SERPL-SCNC: 23 MMOL/L (ref 21–32)
CREAT SERPL-MCNC: 0.6 MG/DL (ref 0.6–1.2)
DEPRECATED RDW RBC AUTO: 17.6 % (ref 12.4–15.4)
DESCRIPTION BLOOD BANK: NORMAL
EOSINOPHIL # BLD: 0.3 K/UL (ref 0–0.6)
EOSINOPHIL NFR BLD: 3.3 %
GFR SERPLBLD CREATININE-BSD FMLA CKD-EPI: >90 ML/MIN/{1.73_M2}
GLUCOSE BLD-MCNC: 215 MG/DL (ref 70–99)
GLUCOSE BLD-MCNC: 233 MG/DL (ref 70–99)
GLUCOSE BLD-MCNC: 252 MG/DL (ref 70–99)
GLUCOSE SERPL-MCNC: 184 MG/DL (ref 70–99)
HCT VFR BLD AUTO: 21.1 % (ref 36–48)
HCT VFR BLD AUTO: 21.6 % (ref 36–48)
HCT VFR BLD AUTO: 25.4 % (ref 36–48)
HGB BLD-MCNC: 7 G/DL (ref 12–16)
HGB BLD-MCNC: 7 G/DL (ref 12–16)
HGB BLD-MCNC: 8 G/DL (ref 12–16)
LYMPHOCYTES # BLD: 1.8 K/UL (ref 1–5.1)
LYMPHOCYTES NFR BLD: 19.1 %
MCH RBC QN AUTO: 30.2 PG (ref 26–34)
MCHC RBC AUTO-ENTMCNC: 33.2 G/DL (ref 31–36)
MCV RBC AUTO: 90.8 FL (ref 80–100)
MONOCYTES # BLD: 0.6 K/UL (ref 0–1.3)
MONOCYTES NFR BLD: 6.7 %
NEUTROPHILS # BLD: 6.5 K/UL (ref 1.7–7.7)
NEUTROPHILS NFR BLD: 70.5 %
PERFORMED ON: ABNORMAL
PHOSPHATE SERPL-MCNC: 3.1 MG/DL (ref 2.5–4.9)
PLATELET # BLD AUTO: 283 K/UL (ref 135–450)
PMV BLD AUTO: 6.8 FL (ref 5–10.5)
POTASSIUM SERPL-SCNC: 3.4 MMOL/L (ref 3.5–5.1)
RBC # BLD AUTO: 2.33 M/UL (ref 4–5.2)
SODIUM SERPL-SCNC: 140 MMOL/L (ref 136–145)
WBC # BLD AUTO: 9.3 K/UL (ref 4–11)

## 2024-09-20 PROCEDURE — 85018 HEMOGLOBIN: CPT

## 2024-09-20 PROCEDURE — 86900 BLOOD TYPING SEROLOGIC ABO: CPT

## 2024-09-20 PROCEDURE — P9016 RBC LEUKOCYTES REDUCED: HCPCS

## 2024-09-20 PROCEDURE — 36415 COLL VENOUS BLD VENIPUNCTURE: CPT

## 2024-09-20 PROCEDURE — 6370000000 HC RX 637 (ALT 250 FOR IP): Performed by: INTERNAL MEDICINE

## 2024-09-20 PROCEDURE — 86901 BLOOD TYPING SEROLOGIC RH(D): CPT

## 2024-09-20 PROCEDURE — 86923 COMPATIBILITY TEST ELECTRIC: CPT

## 2024-09-20 PROCEDURE — 36430 TRANSFUSION BLD/BLD COMPNT: CPT

## 2024-09-20 PROCEDURE — 80069 RENAL FUNCTION PANEL: CPT

## 2024-09-20 PROCEDURE — 86850 RBC ANTIBODY SCREEN: CPT

## 2024-09-20 PROCEDURE — 99232 SBSQ HOSP IP/OBS MODERATE 35: CPT | Performed by: SURGERY

## 2024-09-20 PROCEDURE — 85025 COMPLETE CBC W/AUTO DIFF WBC: CPT

## 2024-09-20 PROCEDURE — 85014 HEMATOCRIT: CPT

## 2024-09-20 RX ORDER — FERROUS SULFATE 325(65) MG
325 TABLET ORAL 2 TIMES DAILY
Qty: 60 TABLET | Refills: 0 | Status: SHIPPED | OUTPATIENT
Start: 2024-09-20

## 2024-09-20 RX ORDER — SODIUM CHLORIDE 9 MG/ML
INJECTION, SOLUTION INTRAVENOUS PRN
Status: DISCONTINUED | OUTPATIENT
Start: 2024-09-20 | End: 2024-09-20 | Stop reason: HOSPADM

## 2024-09-20 RX ORDER — HYDROCORTISONE 25 MG/G
CREAM TOPICAL 2 TIMES DAILY
Qty: 28 G | Refills: 0 | Status: SHIPPED | OUTPATIENT
Start: 2024-09-20

## 2024-09-20 RX ADMIN — PILOCARPINE HYDROCHLORIDE 1 DROP: 10 SOLUTION/ DROPS OPHTHALMIC at 10:19

## 2024-09-20 RX ADMIN — METOPROLOL TARTRATE 25 MG: 25 TABLET, FILM COATED ORAL at 10:23

## 2024-09-20 RX ADMIN — SERTRALINE 100 MG: 50 TABLET, FILM COATED ORAL at 10:14

## 2024-09-20 RX ADMIN — INSULIN LISPRO 2 UNITS: 100 INJECTION, SOLUTION INTRAVENOUS; SUBCUTANEOUS at 12:20

## 2024-09-20 RX ADMIN — BRIMONIDINE TARTRATE 1 DROP: 2 SOLUTION OPHTHALMIC at 14:09

## 2024-09-20 RX ADMIN — DORZOLAMIDE HCL 1 DROP: 20 SOLUTION/ DROPS OPHTHALMIC at 10:24

## 2024-09-20 RX ADMIN — PILOCARPINE HYDROCHLORIDE 1 DROP: 10 SOLUTION/ DROPS OPHTHALMIC at 14:10

## 2024-09-20 RX ADMIN — CETIRIZINE HYDROCHLORIDE 10 MG: 10 TABLET, FILM COATED ORAL at 10:14

## 2024-09-20 RX ADMIN — BRIMONIDINE TARTRATE 1 DROP: 2 SOLUTION OPHTHALMIC at 10:15

## 2024-09-20 RX ADMIN — INSULIN LISPRO 2 UNITS: 100 INJECTION, SOLUTION INTRAVENOUS; SUBCUTANEOUS at 10:14

## 2024-09-20 RX ADMIN — PREDNISOLONE ACETATE 1 DROP: 10 SUSPENSION/ DROPS OPHTHALMIC at 05:25

## 2024-09-20 RX ADMIN — POTASSIUM CHLORIDE 40 MEQ: 1500 TABLET, EXTENDED RELEASE ORAL at 10:14

## 2024-09-20 RX ADMIN — PREDNISOLONE ACETATE 1 DROP: 10 SUSPENSION/ DROPS OPHTHALMIC at 11:58

## 2024-09-20 ASSESSMENT — PAIN SCALES - GENERAL
PAINLEVEL_OUTOF10: 0

## 2024-11-13 ENCOUNTER — OFFICE VISIT (OUTPATIENT)
Dept: SURGERY | Age: 74
End: 2024-11-13
Payer: COMMERCIAL

## 2024-11-13 DIAGNOSIS — K57.31 DIVERTICULAR HEMORRHAGE: Primary | ICD-10-CM

## 2024-11-13 DIAGNOSIS — C18.6 CANCER OF DESCENDING COLON (HCC): ICD-10-CM

## 2024-11-13 PROCEDURE — G8399 PT W/DXA RESULTS DOCUMENT: HCPCS | Performed by: SURGERY

## 2024-11-13 PROCEDURE — 1090F PRES/ABSN URINE INCON ASSESS: CPT | Performed by: SURGERY

## 2024-11-13 PROCEDURE — 99204 OFFICE O/P NEW MOD 45 MIN: CPT | Performed by: SURGERY

## 2024-11-13 PROCEDURE — G8417 CALC BMI ABV UP PARAM F/U: HCPCS | Performed by: SURGERY

## 2024-11-13 PROCEDURE — G8428 CUR MEDS NOT DOCUMENT: HCPCS | Performed by: SURGERY

## 2024-11-13 PROCEDURE — G8484 FLU IMMUNIZE NO ADMIN: HCPCS | Performed by: SURGERY

## 2024-11-13 PROCEDURE — 1123F ACP DISCUSS/DSCN MKR DOCD: CPT | Performed by: SURGERY

## 2024-11-13 PROCEDURE — 1036F TOBACCO NON-USER: CPT | Performed by: SURGERY

## 2024-11-13 NOTE — PROGRESS NOTES
[facility] [place] [facility] COLORECTAL SURGERY  [address] [place] [address]  [address]  [place] [address]  Dept: [phone]  Dept Fax: [phone]  Loc: [phone]    Visit Date: 11/13/2024    Kianna Andrews is a 74 y.o. female who presents today for: New Patient      HPI:       Kianna Andrews is a 74 y.o. female known to me for history of colonic resection in 2021.  She recently had admission to [facility] for what was determined to be diverticular bleed.  Today in the office she is had no issues.  She has stopped bleeding.  She is not exactly sure why she is here to see me.    Objective:     Physical Exam   There were no vitals taken for this visit.  Constitutional: Appears well-developed and well-nourished. Grooming appropriate. No gross deformities. There is no height or weight on file to calculate BMI.    Abdominal/wound: Soft, obese, previous incisions      Labs reviewed: CBC is reviewed from recent hospitalization  Imaging reviewed: CTA reviewed from 9/15/2024 showing no active bleeding with extensive diverticulosis  Coordination/discussion of care: None  Colonoscopy reviewed from hospitalization showing no active bleeding but a fair bit of old blood    Date of last Colonoscopy: 9/16/2024   No cologuard on file  No FIT/FOBT on file   No flexible sigmoidoscopy on file      Assessment/Plan:       A/P:  New problem(s) with uncertain prognosis: Diverticular bleed  Established problem(s): History of descending colon cancer  Additional workup/treatment planned: Continue expectant management  Risk of complications/morbidity: Moderate    Fortunately she has completely stopped bleeding.  Unfortunately, the area of bleeding was never localized, so she does have severe recurrent bleeding requiring surgery, she would need a total abdominal colectomy at this point.  I did advise her that if she has recurrent bleeding she should go to the ER right away to have

## 2025-05-14 ENCOUNTER — OFFICE VISIT (OUTPATIENT)
Dept: ORTHOPEDIC SURGERY | Age: 75
End: 2025-05-14

## 2025-05-14 VITALS — HEIGHT: 60 IN | BODY MASS INDEX: 43.19 KG/M2 | WEIGHT: 220 LBS

## 2025-05-14 DIAGNOSIS — M25.562 CHRONIC PAIN OF BOTH KNEES: Primary | ICD-10-CM

## 2025-05-14 DIAGNOSIS — M17.0 PRIMARY OSTEOARTHRITIS OF BOTH KNEES: ICD-10-CM

## 2025-05-14 DIAGNOSIS — G89.29 CHRONIC PAIN OF BOTH KNEES: Primary | ICD-10-CM

## 2025-05-14 DIAGNOSIS — M25.561 CHRONIC PAIN OF BOTH KNEES: Primary | ICD-10-CM

## 2025-05-14 RX ORDER — TRIAMCINOLONE ACETONIDE 40 MG/ML
40 INJECTION, SUSPENSION INTRA-ARTICULAR; INTRAMUSCULAR ONCE
Status: COMPLETED | OUTPATIENT
Start: 2025-05-14 | End: 2025-05-14

## 2025-05-14 RX ORDER — BUPIVACAINE HYDROCHLORIDE 2.5 MG/ML
2 INJECTION, SOLUTION INFILTRATION; PERINEURAL ONCE
Status: COMPLETED | OUTPATIENT
Start: 2025-05-14 | End: 2025-05-14

## 2025-05-14 RX ADMIN — TRIAMCINOLONE ACETONIDE 40 MG: 40 INJECTION, SUSPENSION INTRA-ARTICULAR; INTRAMUSCULAR at 11:05

## 2025-05-14 RX ADMIN — BUPIVACAINE HYDROCHLORIDE 5 MG: 2.5 INJECTION, SOLUTION INFILTRATION; PERINEURAL at 11:04

## 2025-05-14 NOTE — PROGRESS NOTES
Subjective:      Patient ID: Kianna Andrews is a 74 y.o.  female who presents with a new complaint of  right and left knee pain.  She states she has been treated elsewhere 2 years ago for similar knee pain, knee arthritis.  She believes it was the arthritis Center.  Onset of symptoms 10 years.   These symptoms have been progressive in nature.   History of injury- no.   Pain is 7/10 VAS.   Location of pain- medial left and right knee.   Pain is worse with weight bearing activity.   Pain improves with rest and elevation.   Length of ambulation is typically affected by the knee pain.    Previous treatments have included- ice and tylenol.  Prior cortisone and viscosupplementation injections at the arthritis Center..   Family history for osteoarthritis- positive.    Review of Systems:  Constitutional: denies fever, chills, weight loss.  MSK: denies pain in other joints, muscle aches.  Neurological: denies numbness, tingling, weakness.        Past Medical History:   Diagnosis Date    Allergic rhinitis     Asthma     Back pain     Colon cancer (HCC)     GI bleeding 12/18/2014    Glaucoma     both eyes    Hypertension     Knee pain     Morbid obesity due to excess calories (HCC) 11/02/2015    Osteoporosis     Type II or unspecified type diabetes mellitus without mention of complication, not stated as uncontrolled        Family History   Problem Relation Age of Onset    Diabetes Mother     Cancer Father     Lung Cancer Father     Diabetes Brother        Past Surgical History:   Procedure Laterality Date    CATARACT REMOVAL WITH IMPLANT Bilateral     COLONOSCOPY  09/27/2014    polyp removed    COLONOSCOPY  01/30/2020    COLONOSCOPY POLYPECTOMY SNARE/COLD BIOPSY performed by Brandon Myers MD at [facility] ENDOSCOPY    COLONOSCOPY  01/30/2020    COLONOSCOPY SUBMUCOSAL/BOTOX INJECTION performed by Brandon Myers MD at [facility] ENDOSCOPY    COLONOSCOPY N/A 06/19/2023    COLONOSCOPY DIAGNOSTIC performed by Jorge Luis Abdullahi MD

## 2025-05-20 ENCOUNTER — TELEPHONE (OUTPATIENT)
Dept: ORTHOPEDIC SURGERY | Age: 75
End: 2025-05-20

## 2025-05-20 NOTE — TELEPHONE ENCOUNTER
Patient will call back once she knows more about transportation    DATE 5/20/25     EUFLEXXA given every 7 days for three weeks.     Approval Auth # [id number] and Dates 5/20/25-11/19/25.     BILATERAL M17.0     Buy and Bill     Number of visits requested:  Three.     Number of visits approved: Three.

## 2025-06-11 ENCOUNTER — OFFICE VISIT (OUTPATIENT)
Dept: ORTHOPEDIC SURGERY | Age: 75
End: 2025-06-11
Payer: COMMERCIAL

## 2025-06-11 VITALS — WEIGHT: 220 LBS | HEIGHT: 60 IN | BODY MASS INDEX: 43.19 KG/M2

## 2025-06-11 DIAGNOSIS — M17.0 PRIMARY OSTEOARTHRITIS OF BOTH KNEES: Primary | ICD-10-CM

## 2025-06-11 PROCEDURE — 20610 DRAIN/INJ JOINT/BURSA W/O US: CPT | Performed by: PHYSICIAN ASSISTANT

## 2025-06-11 NOTE — PROGRESS NOTES
Subjective:    She  is here for visco supplementation injection # 1 for the right and left knee.      Objective:   Height 1.524 m (5'), weight 99.8 kg (220 lb), not currently breastfeeding.    Examination of the right and left knee:   Inspection of the skin reveals no unusual erythema.  There is mild intra-articular effusion.  There is mild pain associated with ROM testing.   Extensor Mechanism is intact.        Diagnosis:    ICD-10-CM    1. Primary osteoarthritis of both knees  M17.0 DRAIN/INJECT LARGE JOINT/BURSA     sodium hyaluronate (EUFLEXXA, HYALGAN) injection 20 mg     sodium hyaluronate (EUFLEXXA, HYALGAN) injection 20 mg          Assessment/ Plan:  Knee pain related to knee arthritis.     Discussed at length conservative treatment of knee symptoms/arthritis, according to AAOS Clinical Practice Guidelines:  Evidence for intra-articular hyaluronic acid injections (viscosupplementation) is not as strong, but may benefit a subset of patients who have failed other options.  Informed patient viscosupplementation can be performed every 6 months as needed.     Risks and benefits of viscosupplementation injections were also discussed today.  Risks include but not limited to increased pain, bleeding, infection, failure to provide improvement, neurovascular injury. She had ample time for discussion and questions were answered to her satisfaction. She verbally consented to proceed with intra-articular injection today.    Proceed with injection # 1 in the series of 3 injections for the  right and left knee.    PROCEDURE NOTE:  PRE-PROCEDURE DIAGNOSIS: DJD knee  POST-PROCEDURE DIAGNOSIS: DJD knee  With Kianna Andrews permission, her  right and left knee was prepped in standard sterile fashion with  Alcohol. The prefilled injection of the visco supplementation ( Euflexxa 20 mg/ 2 ML ) was injected into the  anterior-lateral joint space compartment without difficulty.  she tolerated the procedure well without

## 2025-06-19 ENCOUNTER — OFFICE VISIT (OUTPATIENT)
Dept: ORTHOPEDIC SURGERY | Age: 75
End: 2025-06-19
Payer: COMMERCIAL

## 2025-06-19 VITALS — WEIGHT: 220 LBS | BODY MASS INDEX: 43.19 KG/M2 | HEIGHT: 60 IN

## 2025-06-19 DIAGNOSIS — M17.0 PRIMARY OSTEOARTHRITIS OF BOTH KNEES: Primary | ICD-10-CM

## 2025-06-19 PROCEDURE — 20610 DRAIN/INJ JOINT/BURSA W/O US: CPT | Performed by: PHYSICIAN ASSISTANT

## 2025-06-19 NOTE — PROGRESS NOTES
Subjective:    She  is here for visco supplementation injection # 2 for the right and left knee.      Objective:   Height 1.524 m (5'), weight 99.8 kg (220 lb), not currently breastfeeding.    Examination of the right and left knee:   Inspection of the skin reveals no unusual erythema.  There is mild intra-articular effusion.  There is mild pain associated with ROM testing.   Extensor Mechanism is intact.        Diagnosis:    ICD-10-CM    1. Primary osteoarthritis of both knees  M17.0 DRAIN/INJECT LARGE JOINT/BURSA     sodium hyaluronate (EUFLEXXA, HYALGAN) injection 20 mg     sodium hyaluronate (EUFLEXXA, HYALGAN) injection 20 mg          Assessment/ Plan:  Knee pain related to knee arthritis.     Discussed at length conservative treatment of knee symptoms/arthritis, according to AAOS Clinical Practice Guidelines:  Evidence for intra-articular hyaluronic acid injections (viscosupplementation) is not as strong, but may benefit a subset of patients who have failed other options.  Informed patient viscosupplementation can be performed every 6 months as needed.     Risks and benefits of viscosupplementation injections were also discussed today.  Risks include but not limited to increased pain, bleeding, infection, failure to provide improvement, neurovascular injury. She had ample time for discussion and questions were answered to her satisfaction. She verbally consented to proceed with intra-articular injection today.    Proceed with injection # 2 in the series of 3 injections for the  right and left knee.    PROCEDURE NOTE:  PRE-PROCEDURE DIAGNOSIS: DJD knee  POST-PROCEDURE DIAGNOSIS: DJD knee  With Kianna Andrews permission, her  right and left knee was prepped in standard sterile fashion with  Alcohol. The prefilled injection of the visco supplementation ( Euflexxa 20 mg/ 2 ML ) was injected into the  anterior-lateral joint space compartment without difficulty.  she tolerated the procedure well without

## 2025-06-20 ENCOUNTER — HOSPITAL ENCOUNTER (OUTPATIENT)
Dept: MAMMOGRAPHY | Age: 75
Discharge: HOME | End: 2025-06-20
Payer: COMMERCIAL

## 2025-06-20 VITALS — HEIGHT: 61 IN | BODY MASS INDEX: 40.78 KG/M2 | WEIGHT: 216 LBS

## 2025-06-20 DIAGNOSIS — Z12.31 VISIT FOR SCREENING MAMMOGRAM: ICD-10-CM

## 2025-06-20 PROCEDURE — 77063 BREAST TOMOSYNTHESIS BI: CPT

## 2025-06-25 ENCOUNTER — OFFICE VISIT (OUTPATIENT)
Dept: ORTHOPEDIC SURGERY | Age: 75
End: 2025-06-25

## 2025-06-25 VITALS — WEIGHT: 215 LBS | HEIGHT: 60 IN | BODY MASS INDEX: 42.21 KG/M2

## 2025-06-25 DIAGNOSIS — M17.0 PRIMARY OSTEOARTHRITIS OF BOTH KNEES: Primary | ICD-10-CM

## 2025-06-25 NOTE — PROGRESS NOTES
Subjective:    She  is here for visco supplementation injection # 3 for the right and left knee.      Objective:   Height 1.524 m (5'), weight 97.5 kg (215 lb), not currently breastfeeding.    Examination of the right and left knee:   Inspection of the skin reveals no unusual erythema.  There is mild intra-articular effusion.  There is mild pain associated with ROM testing.   Extensor Mechanism is intact.        Diagnosis:    ICD-10-CM    1. Primary osteoarthritis of both knees  M17.0 DRAIN/INJECT LARGE JOINT/BURSA     sodium hyaluronate (EUFLEXXA, HYALGAN) injection 20 mg     sodium hyaluronate (EUFLEXXA, HYALGAN) injection 20 mg          Assessment/ Plan:  Knee pain related to knee arthritis.     Discussed at length conservative treatment of knee symptoms/arthritis, according to AAOS Clinical Practice Guidelines:  Evidence for intra-articular hyaluronic acid injections (viscosupplementation) is not as strong, but may benefit a subset of patients who have failed other options.  Informed patient viscosupplementation can be performed every 6 months as needed.     Risks and benefits of viscosupplementation injections were also discussed today.  Risks include but not limited to increased pain, bleeding, infection, failure to provide improvement, neurovascular injury. She had ample time for discussion and questions were answered to her satisfaction. She verbally consented to proceed with intra-articular injection today.    Proceed with injection # 3 in the series of 3 injections for the  right and left knee.    PROCEDURE NOTE:  PRE-PROCEDURE DIAGNOSIS: DJD knee  POST-PROCEDURE DIAGNOSIS: DJD knee  With Kianna Andrews permission, her  right and left knee was prepped in standard sterile fashion with  Alcohol. The prefilled injection of the visco supplementation ( Euflexxa 20 mg/ 2 ML ) was injected into the  anterior-lateral joint space compartment without difficulty.  she tolerated the procedure well without

## 2025-08-14 ENCOUNTER — OFFICE VISIT (OUTPATIENT)
Dept: ORTHOPEDIC SURGERY | Age: 75
End: 2025-08-14

## 2025-08-14 DIAGNOSIS — M17.0 PRIMARY OSTEOARTHRITIS OF BOTH KNEES: Primary | ICD-10-CM

## 2025-08-14 RX ORDER — BUPIVACAINE HYDROCHLORIDE 2.5 MG/ML
2 INJECTION, SOLUTION INFILTRATION; PERINEURAL ONCE
Status: COMPLETED | OUTPATIENT
Start: 2025-08-14 | End: 2025-08-14

## 2025-08-14 RX ORDER — TRIAMCINOLONE ACETONIDE 40 MG/ML
40 INJECTION, SUSPENSION INTRA-ARTICULAR; INTRAMUSCULAR ONCE
Status: COMPLETED | OUTPATIENT
Start: 2025-08-14 | End: 2025-08-14

## 2025-08-14 RX ADMIN — TRIAMCINOLONE ACETONIDE 40 MG: 40 INJECTION, SUSPENSION INTRA-ARTICULAR; INTRAMUSCULAR at 10:35

## 2025-08-14 RX ADMIN — BUPIVACAINE HYDROCHLORIDE 5 MG: 2.5 INJECTION, SOLUTION INFILTRATION; PERINEURAL at 10:35

## 2025-08-14 RX ADMIN — TRIAMCINOLONE ACETONIDE 40 MG: 40 INJECTION, SUSPENSION INTRA-ARTICULAR; INTRAMUSCULAR at 10:36

## (undated) DEVICE — AIR/WATER CLEANING ADAPTER FOR OLYMPUS® GI ENDOSCOPE: Brand: BULLDOG®

## (undated) DEVICE — BW-412T DISP COMBO CLEANING BRUSH: Brand: SINGLE USE COMBINATION CLEANING BRUSH

## (undated) DEVICE — DRAPE,UNDERBUTTOCKS,PCH,STERILE: Brand: MEDLINE

## (undated) DEVICE — SOLUTION IV IRRIG WATER 500ML POUR BRL ST 2F7113

## (undated) DEVICE — FORCEPS BX L240CM WRK CHN 2.8MM STD CAP W/ NDL MIC MESH

## (undated) DEVICE — SYSTEM SMK EVAC LAP TBNG FILTER HSNG BENT STYL PNK SEE CLR

## (undated) DEVICE — Z INACTIVE PER PHARM AGENT INJ SUBMUCOSAL 10 CC ELEVIEW

## (undated) DEVICE — TRAP SPEC RETRV CLR PLAS POLYP IN LN SUCT QUIK CTCH

## (undated) DEVICE — APPLICATOR MEDICATED 26 CC SOLUTION HI LT ORNG CHLORAPREP

## (undated) DEVICE — SOLUTION BOWL: Brand: KENDALL

## (undated) DEVICE — ADHESIVE SKIN CLSR 0.7ML TOP DERMBND ADV

## (undated) DEVICE — BLADELESS OBTURATOR, LONG: Brand: WECK VISTA

## (undated) DEVICE — PROCEDURE KIT ENDOSCP CUST

## (undated) DEVICE — PUMP SUC IRR TBNG L10FT W/ HNDPC ASSEMB STRYKEFLOW 2

## (undated) DEVICE — PERMANENT CAUTERY HOOK: Brand: ENDOWRIST

## (undated) DEVICE — SOLUTION IV 1000ML 0.9% SOD CHL PH 5 INJ USP VIAFLX PLAS

## (undated) DEVICE — GARMENT,MEDLINE,DVT,INT,CALF,MED, GEN2: Brand: MEDLINE

## (undated) DEVICE — Device: Brand: DISPOSABLE ELECTROSURGICAL SNARE

## (undated) DEVICE — VALVE SUCTION AIR H2O SET ORCA POD + DISP

## (undated) DEVICE — CANNULA SEAL

## (undated) DEVICE — GOWN,SIRUS,POLYRNF,BRTHSLV,LG,30/CS: Brand: MEDLINE

## (undated) DEVICE — ENDOSCOPIC KIT 6X3/16 FT COLON W/ 1.1 OZ 2 GWN W/O BRSH

## (undated) DEVICE — 40583 XL ADVANCED TRENDELENBURG POSITIONING KIT: Brand: 40583 XL ADVANCED TRENDELENBURG POSITIONING KIT

## (undated) DEVICE — SEAL

## (undated) DEVICE — TIP-UP FENESTRATED GRASPER: Brand: ENDOWRIST

## (undated) DEVICE — GLOVE SURG SZ 7 CRM LTX FREE POLYISOPRENE POLYMER BEAD ANTI

## (undated) DEVICE — SURGICAL SET UP - SURE SET: Brand: MEDLINE INDUSTRIES, INC.

## (undated) DEVICE — TRAY CATHETER 16FR F INCLUDE BARDX IC COMPLT CARE DRNGE BG

## (undated) DEVICE — PLATE ES AD W 9FT CRD 2

## (undated) DEVICE — SOLUTION IRRIG 500ML STRL H2O NONPYROGENIC

## (undated) DEVICE — BLADELESS OBTURATOR: Brand: WECK VISTA

## (undated) DEVICE — NEEDLE HYPO 22GA L1.5IN BLK POLYPR HUB S STL REG BVL STR

## (undated) DEVICE — SURE SET-DOUBLE BASIN-LF: Brand: MEDLINE INDUSTRIES, INC.

## (undated) DEVICE — CATHETER DRNGE 34FR 2 EYE PROPORTIONATE HD DISP FOR

## (undated) DEVICE — SINGLE USE AIR/WATER, SUCTION AND BIOPSY VALVES SET: Brand: ORCAPOD™

## (undated) DEVICE — TROCAR: Brand: KII FIOS FIRST ENTRY

## (undated) DEVICE — ACCESS PLATFORM FOR MINIMALLY INVASIVE SURGERY.: Brand: GELPORT® LAPAROSCOPIC  SYSTEM

## (undated) DEVICE — SUTURE MCRYL SZ 4-0 L27IN ABSRB UD L19MM PS-2 1/2 CIR PRIM Y426H

## (undated) DEVICE — 3M™ IOBAN™ 2 ANTIMICROBIAL INCISE DRAPE 6648EZ: Brand: IOBAN™ 2

## (undated) DEVICE — ELECTRODE PT RET AD L9FT HI MOIST COND ADH HYDRGEL CORDED

## (undated) DEVICE — SYRINGE MED 10ML SLIP TIP BLNT FILL AND LUERLOCK DISP

## (undated) DEVICE — ARM DRAPE

## (undated) DEVICE — SOLUTION IV 1000ML 0.9% SOD CHL

## (undated) DEVICE — SUTURE PDS II SZ 0 L60IN ABSRB VLT L48MM CTX 1/2 CIR Z990G

## (undated) DEVICE — SOLUTION INJ LR VISIV 1000ML BG

## (undated) DEVICE — Device

## (undated) DEVICE — RELOAD STPL L75MM OPN H3.8MM CLS 1.5MM WIRE DIA0.2MM REG

## (undated) DEVICE — SPONGE,LAP,18"X18",DLX,XR,ST,5/PK,40/PK: Brand: MEDLINE

## (undated) DEVICE — 60 ML SYRINGE,REGULAR TIP: Brand: MONOJECT

## (undated) DEVICE — SET VLV 3 PC AWS DISPOSABLE GRDIAN SCOPEVALET

## (undated) DEVICE — INTENDED FOR TISSUE SEPARATION, AND OTHER PROCEDURES THAT REQUIRE A SHARP SURGICAL BLADE TO PUNCTURE OR CUT.: Brand: BARD-PARKER ® CARBON RIB-BACK BLADES

## (undated) DEVICE — STAPLER EXT 65MM S STL AUTO DISP PURSTRING

## (undated) DEVICE — SUTURE VCRL SZ 0 L27IN ABSRB UD L36MM CT-1 1/2 CIR J260H

## (undated) DEVICE — Device: Brand: SPOT EX ENDOSCOPIC TATTOO

## (undated) DEVICE — STERILE PVP: Brand: MEDLINE INDUSTRIES, INC.

## (undated) DEVICE — SEALER/DIVIDER LAP SHFT L44CM JAW APER 11.4MM 315DEG ROT

## (undated) DEVICE — LEGGINGS, PAIR, 33X51 XL, STERILE: Brand: MEDLINE

## (undated) DEVICE — STAPLER INT L75MM CUT LN L73MM STPL LN L77MM BLU B FRM 8

## (undated) DEVICE — [HIGH FLOW INSUFFLATOR,  DO NOT USE IF PACKAGE IS DAMAGED,  KEEP DRY,  KEEP AWAY FROM SUNLIGHT,  PROTECT FROM HEAT AND RADIOACTIVE SOURCES.]: Brand: PNEUMOSURE

## (undated) DEVICE — SYRINGE CATH TIP 50ML

## (undated) DEVICE — NEEDLE 25GAX5.0MM INJ CARR LOCKE

## (undated) DEVICE — SUTURE VCRL SZ 3-0 L18IN ABSRB UD L26MM SH 1/2 CIR J864D

## (undated) DEVICE — SUTURE PERMAHAND SZ 3-0 L18IN NONABSORBABLE BLK L26MM SH C013D

## (undated) DEVICE — FENESTRATED BIPOLAR FORCEPS: Brand: ENDOWRIST

## (undated) DEVICE — TRAY PREP DRY W/ PREM GLV 2 APPL 6 SPNG 2 UNDPD 1 OVERWRAP

## (undated) DEVICE — SNARE COLD DIAMOND 15MM THIN

## (undated) DEVICE — REDUCER: Brand: ENDOWRIST

## (undated) DEVICE — BLANKET WRM W29.9XL79.1IN UP BODY FORC AIR MISTRAL-AIR

## (undated) DEVICE — DRAPE,LAP,CHOLE,W/TROUGHS,STERILE: Brand: MEDLINE

## (undated) DEVICE — AGENT HEMSTAT W2XL14IN OXIDIZED REGENERATED CELOS ABSRB FOR

## (undated) DEVICE — ELECTROSURGICAL PENCIL ROCKER SWITCH NON COATED BLADE ELECTRODE 10 FT (3 M) CORD HOLSTER: Brand: MEGADYNE

## (undated) DEVICE — SOLUTION ANTIFOG VIS SYS CLEARIFY LAPSCP

## (undated) DEVICE — JEWISH HOSPITAL TURNOVER KIT: Brand: MEDLINE INDUSTRIES, INC.